# Patient Record
Sex: FEMALE | Race: WHITE | Employment: PART TIME
[De-identification: names, ages, dates, MRNs, and addresses within clinical notes are randomized per-mention and may not be internally consistent; named-entity substitution may affect disease eponyms.]

---

## 2017-01-03 DIAGNOSIS — R35.0 URINARY FREQUENCY: Primary | ICD-10-CM

## 2017-01-05 ENCOUNTER — PATIENT MESSAGE (OUTPATIENT)
Dept: FAMILY MEDICINE CLINIC | Facility: CLINIC | Age: 42
End: 2017-01-05

## 2017-01-18 DIAGNOSIS — R92.2 DENSE BREAST TISSUE: ICD-10-CM

## 2017-01-18 DIAGNOSIS — Z80.3 FAMILY HISTORY OF BREAST CANCER: ICD-10-CM

## 2017-01-25 DIAGNOSIS — F41.9 ANXIETY: ICD-10-CM

## 2017-01-25 RX ORDER — ALPRAZOLAM 0.5 MG/1
0.5 TABLET ORAL 2 TIMES DAILY PRN
Qty: 60 TABLET | Refills: 0 | Status: SHIPPED | OUTPATIENT
Start: 2017-01-25 | End: 2017-03-02 | Stop reason: SDUPTHER

## 2017-02-27 ENCOUNTER — PATIENT MESSAGE (OUTPATIENT)
Dept: FAMILY MEDICINE CLINIC | Facility: CLINIC | Age: 42
End: 2017-02-27

## 2017-02-27 DIAGNOSIS — R05.9 COUGH: ICD-10-CM

## 2017-02-27 DIAGNOSIS — J20.9 ACUTE BRONCHITIS, UNSPECIFIED ORGANISM: ICD-10-CM

## 2017-02-27 DIAGNOSIS — R06.2 WHEEZING: ICD-10-CM

## 2017-02-27 RX ORDER — AZITHROMYCIN 250 MG/1
TABLET, FILM COATED ORAL
Qty: 6 TABLET | Refills: 0 | Status: SHIPPED | OUTPATIENT
Start: 2017-02-27 | End: 2019-06-14 | Stop reason: SDUPTHER

## 2017-02-27 RX ORDER — PREDNISONE 20 MG/1
TABLET ORAL
Qty: 30 TABLET | Refills: 0 | Status: SHIPPED | OUTPATIENT
Start: 2017-02-27 | End: 2019-06-14 | Stop reason: SDUPTHER

## 2017-03-02 DIAGNOSIS — F41.9 ANXIETY: ICD-10-CM

## 2017-03-02 RX ORDER — ALPRAZOLAM 0.5 MG/1
0.5 TABLET ORAL 2 TIMES DAILY PRN
Qty: 60 TABLET | Refills: 0 | Status: SHIPPED | OUTPATIENT
Start: 2017-03-02 | End: 2017-04-11 | Stop reason: SDUPTHER

## 2017-04-11 ENCOUNTER — TELEPHONE (OUTPATIENT)
Dept: FAMILY MEDICINE CLINIC | Age: 42
End: 2017-04-11

## 2017-04-11 ENCOUNTER — PATIENT MESSAGE (OUTPATIENT)
Dept: FAMILY MEDICINE CLINIC | Age: 42
End: 2017-04-11

## 2017-04-11 DIAGNOSIS — F41.9 ANXIETY: ICD-10-CM

## 2017-04-11 RX ORDER — ALPRAZOLAM 0.5 MG/1
0.5 TABLET ORAL 2 TIMES DAILY PRN
Qty: 60 TABLET | Refills: 0 | Status: SHIPPED | OUTPATIENT
Start: 2017-04-11 | End: 2017-05-23 | Stop reason: SDUPTHER

## 2017-05-23 DIAGNOSIS — F41.9 ANXIETY: ICD-10-CM

## 2017-05-23 RX ORDER — ALPRAZOLAM 0.5 MG/1
0.5 TABLET ORAL 2 TIMES DAILY PRN
Qty: 60 TABLET | Refills: 0 | Status: SHIPPED | OUTPATIENT
Start: 2017-05-23 | End: 2017-07-12 | Stop reason: SDUPTHER

## 2017-07-12 ENCOUNTER — OFFICE VISIT (OUTPATIENT)
Dept: FAMILY MEDICINE CLINIC | Age: 42
End: 2017-07-12
Payer: COMMERCIAL

## 2017-07-12 VITALS
RESPIRATION RATE: 16 BRPM | HEART RATE: 80 BPM | BODY MASS INDEX: 21.3 KG/M2 | DIASTOLIC BLOOD PRESSURE: 82 MMHG | SYSTOLIC BLOOD PRESSURE: 114 MMHG | TEMPERATURE: 98.3 F | WEIGHT: 130 LBS

## 2017-07-12 DIAGNOSIS — F51.01 PRIMARY INSOMNIA: ICD-10-CM

## 2017-07-12 DIAGNOSIS — F41.9 ANXIETY: Primary | ICD-10-CM

## 2017-07-12 DIAGNOSIS — F41.9 ANXIETY: ICD-10-CM

## 2017-07-12 DIAGNOSIS — Z12.39 BREAST CANCER SCREENING: ICD-10-CM

## 2017-07-12 PROCEDURE — 99214 OFFICE O/P EST MOD 30 MIN: CPT | Performed by: NURSE PRACTITIONER

## 2017-07-12 RX ORDER — ALPRAZOLAM 0.5 MG/1
0.5 TABLET ORAL 2 TIMES DAILY PRN
Qty: 60 TABLET | Refills: 0 | Status: SHIPPED | OUTPATIENT
Start: 2017-07-12 | End: 2017-08-15 | Stop reason: SDUPTHER

## 2017-07-12 ASSESSMENT — ENCOUNTER SYMPTOMS
CONSTIPATION: 0
EYES NEGATIVE: 1
COUGH: 1
WHEEZING: 0
SORE THROAT: 0
NAUSEA: 0
SHORTNESS OF BREATH: 0
SINUS PRESSURE: 0
BLOOD IN STOOL: 0
CHEST TIGHTNESS: 0
ABDOMINAL PAIN: 0

## 2017-08-15 DIAGNOSIS — F41.9 ANXIETY: ICD-10-CM

## 2017-08-15 RX ORDER — ALPRAZOLAM 0.5 MG/1
0.5 TABLET ORAL 2 TIMES DAILY PRN
Qty: 60 TABLET | Refills: 0 | Status: SHIPPED | OUTPATIENT
Start: 2017-08-15 | End: 2017-09-21 | Stop reason: SDUPTHER

## 2017-09-14 DIAGNOSIS — Z12.39 BREAST CANCER SCREENING: ICD-10-CM

## 2017-09-21 DIAGNOSIS — F41.9 ANXIETY: ICD-10-CM

## 2017-09-21 RX ORDER — ALPRAZOLAM 0.5 MG/1
0.5 TABLET ORAL 2 TIMES DAILY PRN
Qty: 60 TABLET | Refills: 0 | Status: SHIPPED | OUTPATIENT
Start: 2017-09-21 | End: 2017-10-26 | Stop reason: SDUPTHER

## 2017-10-26 ENCOUNTER — TELEPHONE (OUTPATIENT)
Dept: FAMILY MEDICINE CLINIC | Age: 42
End: 2017-10-26

## 2017-10-26 DIAGNOSIS — F41.9 ANXIETY: ICD-10-CM

## 2017-10-26 RX ORDER — ALPRAZOLAM 0.5 MG/1
0.5 TABLET ORAL 2 TIMES DAILY PRN
Qty: 60 TABLET | Refills: 0 | Status: SHIPPED | OUTPATIENT
Start: 2017-10-26 | End: 2017-12-07 | Stop reason: SDUPTHER

## 2017-10-26 NOTE — TELEPHONE ENCOUNTER
LOV was 7-12-17, LR was 9-21-17. cm  Next Visit Date:  No future appointments.     Health Maintenance   Topic Date Due    HIV screen  01/31/1989    Flu vaccine (1) 09/01/2017    Breast cancer screen  09/12/2018    Cervical cancer screen  12/29/2021    Lipid screen  01/03/2022    DTaP/Tdap/Td vaccine (2 - Td) 04/09/2024    Pneumococcal med risk  Completed       No results found for: LABA1C          ( goal A1C is < 7)   No results found for: LABMICR  LDL Cholesterol (mg/dL)   Date Value   01/03/2017 119     LDL Calculated (mg/dL)   Date Value   08/29/2014 129       (goal LDL is <100)   AST (U/L)   Date Value   01/03/2017 14     ALT (U/L)   Date Value   01/03/2017 10     BUN (mg/dL)   Date Value   01/03/2017 9     BP Readings from Last 3 Encounters:   07/12/17 114/82   12/29/16 128/82   11/04/16 126/82          (goal 120/80)    All Future Testing planned in [facility]              Patient Active Problem List:     Facet arthritis of lumbar region ([facility] [place] [address]     Spondylolisthesis at L4-L5 level     Acute right-sided low back pain with right-sided sciatica

## 2017-12-07 ENCOUNTER — OFFICE VISIT (OUTPATIENT)
Dept: FAMILY MEDICINE CLINIC | Age: 42
End: 2017-12-07
Payer: COMMERCIAL

## 2017-12-07 VITALS
TEMPERATURE: 98.4 F | DIASTOLIC BLOOD PRESSURE: 82 MMHG | BODY MASS INDEX: 21.47 KG/M2 | SYSTOLIC BLOOD PRESSURE: 124 MMHG | RESPIRATION RATE: 16 BRPM | WEIGHT: 131 LBS | HEART RATE: 92 BPM

## 2017-12-07 DIAGNOSIS — F41.9 ANXIETY: ICD-10-CM

## 2017-12-07 PROCEDURE — 99214 OFFICE O/P EST MOD 30 MIN: CPT | Performed by: NURSE PRACTITIONER

## 2017-12-07 RX ORDER — ALPRAZOLAM 0.5 MG/1
0.5 TABLET ORAL 2 TIMES DAILY PRN
Qty: 60 TABLET | Refills: 0 | Status: SHIPPED | OUTPATIENT
Start: 2017-12-07 | End: 2018-01-12 | Stop reason: SDUPTHER

## 2017-12-07 ASSESSMENT — ENCOUNTER SYMPTOMS
SORE THROAT: 0
CONSTIPATION: 0
EYES NEGATIVE: 1
ABDOMINAL PAIN: 0
NAUSEA: 0
CHEST TIGHTNESS: 0
WHEEZING: 0
SINUS PRESSURE: 0
BLOOD IN STOOL: 0
SHORTNESS OF BREATH: 0
COUGH: 1

## 2017-12-07 ASSESSMENT — PATIENT HEALTH QUESTIONNAIRE - PHQ9
1. LITTLE INTEREST OR PLEASURE IN DOING THINGS: 0
SUM OF ALL RESPONSES TO PHQ9 QUESTIONS 1 & 2: 0
SUM OF ALL RESPONSES TO PHQ QUESTIONS 1-9: 0
2. FEELING DOWN, DEPRESSED OR HOPELESS: 0

## 2017-12-07 NOTE — PROGRESS NOTES
Subjective:      Patient ID: Mile Hayward is a 36 y.o. female. Visit Information    Have you changed or started any medications since your last visit including any over-the-counter medicines, vitamins, or herbal medicines? no   Are you having any side effects from any of your medications? -  no  Have you stopped taking any of your medications? Is so, why? -  no    Have you seen any other physician or provider since your last visit? No  Have you had any other diagnostic tests since your last visit? No  Have you been seen in the emergency room and/or had an admission to a hospital since we last saw you? No  Have you had your routine dental cleaning in the past 6 months? no    Have you activated your [email or web address] account? If not, what are your barriers? Yes     Patient Care Team:  Daren Asencio MD as PCP - General (Pediatrics)    Medical History Review  Past Medical, Family, and Social History reviewed and does contribute to the patient presenting condition    Health Maintenance   Topic Date Due    HIV screen  01/31/1989    Breast cancer screen  09/12/2018    Cervical cancer screen  12/29/2021    Lipid screen  01/03/2022    DTaP/Tdap/Td vaccine (2 - Td) 04/09/2024    Flu vaccine  Completed    Pneumococcal med risk  Completed       Patient presents today to follow up on her anxiety. Overall is doing well. She is using xanax as needed and it works well for her. Has no concerns today. migdalia     Anxiety: Patient complains of evaluation of anxiety disorder. She has the following anxiety symptoms: difficulty concentrating, insomnia, irritable, racing thoughts, sweating. Onset of symptoms was approximately several years ago, stable since that time. She denies current suicidal and homicidal ideation. Family history significant for anxiety. Possible organic causes contributing are: neuro. Risk factors: life situations Previous treatment includes Ativan, Celexa, Wellbutrin and Xanax and none.   She complains of

## 2017-12-07 NOTE — PATIENT INSTRUCTIONS
social groups, or volunteer to help others. Being alone sometimes makes things seem worse than they are. · Get at least 30 minutes of exercise on most days of the week to relieve stress. Walking is a good choice. You also may want to do other activities, such as running, swimming, cycling, or playing tennis or team sports. Relaxation techniques  Do relaxation exercises 10 to 20 minutes a day. You can play soothing, relaxing music while you do them, if you wish. · Tell others in your house that you are going to do your relaxation exercises. Ask them not to disturb you. · Find a comfortable place, away from all distractions and noise. · Lie down on your back, or sit with your back straight. · Focus on your breathing. Make it slow and steady. · Breathe in through your nose. Breathe out through either your nose or mouth. · Breathe deeply, filling up the area between your navel and your rib cage. Breathe so that your belly goes up and down. · Do not hold your breath. · Breathe like this for 5 to 10 minutes. Notice the feeling of calmness throughout your whole body. As you continue to breathe slowly and deeply, relax by doing the following for another 5 to 10 minutes:  · Tighten and relax each muscle group in your body. You can begin at your toes and work your way up to your head. · Imagine your muscle groups relaxing and becoming heavy. · Empty your mind of all thoughts. · Let yourself relax more and more deeply. · Become aware of the state of calmness that surrounds you. · When your relaxation time is over, you can bring yourself back to alertness by moving your fingers and toes and then your hands and feet and then stretching and moving your entire body. Sometimes people fall asleep during relaxation, but they usually wake up shortly afterward. · Always give yourself time to return to full alertness before you drive a car or do anything that might cause an accident if you are not fully alert.  Never play a relaxation tape while you drive a car. When should you call for help? Call 911 anytime you think you may need emergency care. For example, call if:  ? · You feel you cannot stop from hurting yourself or someone else. ? Keep the numbers for these national suicide hotlines: 6-324-188-TALK (9-226.871.3600) and 4-941-OWZKGEC (9-818.822.1375). If you or someone you know talks about suicide or feeling hopeless, get help right away. ? Watch closely for changes in your health, and be sure to contact your doctor if:  ? · You have anxiety or fear that affects your life. ? · You have symptoms of anxiety that are new or different from those you had before. Where can you learn more? Go to https://Now[employer]pe[employer] Informaticseb.[employer] org and sign in to your [employer] account. Enter P754 in the Encision box to learn more about \"Anxiety Disorder: Care Instructions. \"     If you do not have an account, please click on the \"Sign Up Now\" link. Current as of: May 12, 2017  Content Version: 11.4  © 0546-4253 Healthwise, Incorporated. Care instructions adapted under license by [facility] ([facility]). If you have questions about a medical condition or this instruction, always ask your healthcare professional. [address] any warranty or liability for your use of this information.

## 2018-01-12 DIAGNOSIS — F41.9 ANXIETY: ICD-10-CM

## 2018-01-12 RX ORDER — ALPRAZOLAM 0.5 MG/1
0.5 TABLET ORAL 2 TIMES DAILY PRN
Qty: 60 TABLET | Refills: 0 | Status: SHIPPED | OUTPATIENT
Start: 2018-01-12 | End: 2018-02-19 | Stop reason: SDUPTHER

## 2018-01-12 NOTE — TELEPHONE ENCOUNTER
From: Kyung Watts  Sent: 1/11/2018 7:55 AM EST  Subject: Medication Renewal Request    Rula Gayle would like a refill of the following medications:  ALPRAZolam Clara Singh) 0.5 MG tablet Stephane Nolen CNP]    Preferred pharmacy: [address], [address] - P [phone] Venu Self [phone]    Comment:

## 2018-01-18 ENCOUNTER — TELEPHONE (OUTPATIENT)
Dept: FAMILY MEDICINE CLINIC | Age: 43
End: 2018-01-18

## 2018-01-18 DIAGNOSIS — Z80.3 FAMILY HISTORY OF BREAST CANCER: Primary | ICD-10-CM

## 2018-01-18 DIAGNOSIS — R92.2 DENSE BREAST TISSUE: ICD-10-CM

## 2018-01-18 NOTE — TELEPHONE ENCOUNTER
Patient contacted office requesting order for her annual Breast MRI. She would like the order faxed to Stephen [facility] at [phone].  Order pending provider review and approval.

## 2018-02-05 ENCOUNTER — TELEPHONE (OUTPATIENT)
Dept: FAMILY MEDICINE CLINIC | Age: 43
End: 2018-02-05

## 2018-02-08 ENCOUNTER — TELEPHONE (OUTPATIENT)
Dept: FAMILY MEDICINE CLINIC | Age: 43
End: 2018-02-08

## 2018-02-08 ENCOUNTER — OFFICE VISIT (OUTPATIENT)
Dept: FAMILY MEDICINE CLINIC | Age: 43
End: 2018-02-08
Payer: COMMERCIAL

## 2018-02-08 VITALS
TEMPERATURE: 99.3 F | BODY MASS INDEX: 21.66 KG/M2 | SYSTOLIC BLOOD PRESSURE: 110 MMHG | RESPIRATION RATE: 16 BRPM | HEART RATE: 84 BPM | WEIGHT: 130 LBS | DIASTOLIC BLOOD PRESSURE: 70 MMHG | HEIGHT: 65 IN

## 2018-02-08 DIAGNOSIS — R22.32 AXILLARY LUMP, LEFT: Primary | ICD-10-CM

## 2018-02-08 DIAGNOSIS — Z80.3 FAMILY HISTORY OF BREAST CANCER IN SISTER: ICD-10-CM

## 2018-02-08 DIAGNOSIS — Z80.3 FAMILY HISTORY OF BREAST CANCER IN MOTHER: ICD-10-CM

## 2018-02-08 PROCEDURE — 99213 OFFICE O/P EST LOW 20 MIN: CPT | Performed by: NURSE PRACTITIONER

## 2018-02-08 ASSESSMENT — ENCOUNTER SYMPTOMS
COUGH: 1
CONSTIPATION: 0
ABDOMINAL PAIN: 0
DIARRHEA: 0
NAUSEA: 0
SINUS PRESSURE: 0
SHORTNESS OF BREATH: 0
SINUS PAIN: 0
SORE THROAT: 0

## 2018-02-08 NOTE — TELEPHONE ENCOUNTER
Patient states [address] in [place] wants an axillary US order faxed to [phone]. Writer attempted to pend order but order is not showing available to writer. Please advise.

## 2018-02-08 NOTE — PROGRESS NOTES
Subjective:      Patient ID: Ivana Pinzon is a 39 y.o. female. Visit Information    Have you changed or started any medications since your last visit including any over-the-counter medicines, vitamins, or herbal medicines? no   Have you stopped taking any of your medications? Is so, why? -  no  Are you having any side effects from any of your medications? - no    Have you seen any other physician or provider since your last visit?  no   Have you had any other diagnostic tests since your last visit?  no   Have you been seen in the emergency room and/or had an admission in a hospital since we last saw you?  no   Have you had your routine dental cleaning in the past 6 months?  no     Do you have an active MyChart account? If no, what is the barrier? Yes    Patient Care Team:  Yung Llamas MD as PCP - General (Pediatrics)    Medical History Review  Past Medical, Family, and Social History reviewed and does contribute to the patient presenting condition    Health Maintenance   Topic Date Due    HIV screen  01/31/1989    Breast cancer screen  09/12/2018    Cervical cancer screen  12/29/2021    Lipid screen  01/03/2022    DTaP/Tdap/Td vaccine (2 - Td) 04/09/2024    Flu vaccine  Completed    Pneumococcal med risk  Completed       Patient noticed lump on left arm pit 3 weeks ago. Patient reports it bothers her not painful it irritates her. KM    Patient presents today with concerns regarding lump in left axilla for last 3 weeks. It is irritating but not painful. Does have breast MRI schedule [facility].  migdalia         Review of Systems   Constitutional: Negative for activity change, appetite change, fatigue and fever. HENT: Positive for congestion. Negative for ear pain, sinus pain, sinus pressure and sore throat. Respiratory: Positive for cough (little). Negative for shortness of breath. Still smoking  Lump left axilla   Cardiovascular: Negative for chest pain.    Gastrointestinal: Negative for abdominal pain, constipation, diarrhea and nausea. Genitourinary: Positive for frequency (drinking more water). Skin: Negative for rash. Allergic/Immunologic: Positive for environmental allergies. Neurological: Negative for dizziness and syncope. Psychiatric/Behavioral: Negative for self-injury, sleep disturbance and suicidal ideas. The patient is nervous/anxious. Mood is controlled       Objective:   Physical Exam   Constitutional: She is oriented to person, place, and time. Vital signs are normal. She appears well-developed and well-nourished. She is cooperative. No distress. HENT:   Head: Atraumatic. Right Ear: Tympanic membrane, external ear and ear canal normal.   Left Ear: Tympanic membrane, external ear and ear canal normal.   Nose: Nose normal.   Mouth/Throat: Uvula is midline, oropharynx is clear and moist and mucous membranes are normal.   Eyes: Conjunctivae and lids are normal. Pupils are equal, round, and reactive to light. Right eye exhibits no discharge. Left eye exhibits no discharge. Neck: Trachea normal and normal range of motion. Neck supple. Cardiovascular: Normal rate, regular rhythm, S1 normal, S2 normal and normal heart sounds. No murmur heard. Pulses:       Radial pulses are 2+ on the right side, and 2+ on the left side. Posterior tibial pulses are 2+ on the right side, and 2+ on the left side. Pulmonary/Chest: Effort normal and breath sounds normal. No respiratory distress. She has no wheezes. She has no rhonchi. Abdominal: Soft. Bowel sounds are normal. She exhibits no distension. There is no hepatosplenomegaly. There is no tenderness. There is no rebound and no CVA tenderness. Musculoskeletal: Normal range of motion. She exhibits no edema or tenderness. Lymphadenopathy:     She has no cervical adenopathy. She has axillary adenopathy. Right axillary: No pectoral and no lateral adenopathy present.         Left axillary: Lateral (small moveable) adenopathy present. No pectoral adenopathy present. Neurological: She is alert and oriented to person, place, and time. She has normal strength. She exhibits normal muscle tone. Coordination normal.   Skin: Skin is warm, dry and intact. No rash noted. No erythema. Psychiatric: She has a normal mood and affect. Her speech is normal and behavior is normal. Thought content normal.   Nursing note and vitals reviewed. Assessment:       1. Axillary lump, left  US BREASt COMPLETE LEFT   2. Family history of breast cancer in mother  US BREASt COMPLETE LEFT   3. Family history of breast cancer in sister  [address]:       Proceed with ultrasound as ordered  Monitor for worsening symptoms call with concerns   Return if symptoms worsen or fail to improve. Jessenia Parsons received counseling on the following healthy behaviors: nutrition, exercise, medication adherence and tobacco cessation  Reviewed prior labs and health maintenance. Continue current medications, diet and exercise. Discussed use, benefit, and side effects of prescribed medications. Barriers to medication compliance addressed. Patient given educational materials - see patient instructions. All patient questions answered. Patient voiced understanding.

## 2018-02-09 DIAGNOSIS — Z80.3 FAMILY HISTORY OF BREAST CANCER IN SISTER: ICD-10-CM

## 2018-02-09 DIAGNOSIS — Z80.3 FAMILY HISTORY OF BREAST CANCER: ICD-10-CM

## 2018-02-09 DIAGNOSIS — R22.32 LUMP IN ARMPIT, LEFT: Primary | ICD-10-CM

## 2018-02-09 DIAGNOSIS — Z80.3 FAMILY HISTORY OF BREAST CANCER IN MOTHER: ICD-10-CM

## 2018-02-19 DIAGNOSIS — F41.9 ANXIETY: ICD-10-CM

## 2018-02-19 NOTE — TELEPHONE ENCOUNTER
From: Garo Bains  Sent: 2/19/2018 7:54 AM EST  Subject: Medication Renewal Request    Rula RJason Gayle would like a refill of the following medications:  ALPRAZolam Hildegard Son) 0.5 MG tablet Caesar eKlley CNP]    Preferred pharmacy: [address], [address] [phone] Pineda Proffer [phone]    Comment:

## 2018-02-20 RX ORDER — ALPRAZOLAM 0.5 MG/1
0.5 TABLET ORAL 2 TIMES DAILY PRN
Qty: 60 TABLET | Refills: 0 | Status: SHIPPED | OUTPATIENT
Start: 2018-02-20 | End: 2018-03-27 | Stop reason: SDUPTHER

## 2018-02-27 DIAGNOSIS — Z80.3 FAMILY HISTORY OF BREAST CANCER: ICD-10-CM

## 2018-02-27 DIAGNOSIS — R92.2 DENSE BREAST TISSUE: ICD-10-CM

## 2018-03-27 DIAGNOSIS — F41.9 ANXIETY: ICD-10-CM

## 2018-03-27 RX ORDER — ALPRAZOLAM 0.5 MG/1
0.5 TABLET ORAL 2 TIMES DAILY PRN
Qty: 60 TABLET | Refills: 0 | Status: SHIPPED | OUTPATIENT
Start: 2018-03-27 | End: 2018-05-02 | Stop reason: SDUPTHER

## 2018-03-27 NOTE — TELEPHONE ENCOUNTER
From: Marcell Johnston  Sent: 3/26/2018 4:46 PM EDT  Subject: Medication Renewal Request    Rula Gayle would like a refill of the following medications:     ALPRAZolam Divine Tate 0.5 MG tablet Sarah Douglas CNP]    Preferred pharmacy: [address], [address] - P 616 [facility]

## 2018-05-02 ENCOUNTER — OFFICE VISIT (OUTPATIENT)
Dept: FAMILY MEDICINE CLINIC | Age: 43
End: 2018-05-02
Payer: COMMERCIAL

## 2018-05-02 VITALS
BODY MASS INDEX: 20.97 KG/M2 | WEIGHT: 126 LBS | HEART RATE: 80 BPM | RESPIRATION RATE: 16 BRPM | DIASTOLIC BLOOD PRESSURE: 76 MMHG | SYSTOLIC BLOOD PRESSURE: 124 MMHG

## 2018-05-02 DIAGNOSIS — N94.6 MENSES PAINFUL: ICD-10-CM

## 2018-05-02 DIAGNOSIS — F41.9 ANXIETY: Primary | ICD-10-CM

## 2018-05-02 DIAGNOSIS — N92.0 MENORRHAGIA WITH REGULAR CYCLE: ICD-10-CM

## 2018-05-02 PROCEDURE — 99214 OFFICE O/P EST MOD 30 MIN: CPT | Performed by: NURSE PRACTITIONER

## 2018-05-02 RX ORDER — ALPRAZOLAM 0.5 MG/1
0.5 TABLET ORAL 2 TIMES DAILY PRN
Qty: 60 TABLET | Refills: 0 | Status: SHIPPED | OUTPATIENT
Start: 2018-05-02 | End: 2018-06-15 | Stop reason: SDUPTHER

## 2018-05-02 ASSESSMENT — ENCOUNTER SYMPTOMS
SINUS PRESSURE: 0
CONSTIPATION: 0
SORE THROAT: 0
CHEST TIGHTNESS: 0
WHEEZING: 0
SHORTNESS OF BREATH: 0
NAUSEA: 0
COUGH: 1
EYES NEGATIVE: 1
ABDOMINAL PAIN: 0
BLOOD IN STOOL: 0

## 2018-06-15 DIAGNOSIS — F41.9 ANXIETY: ICD-10-CM

## 2018-06-15 RX ORDER — ALPRAZOLAM 0.5 MG/1
0.5 TABLET ORAL 2 TIMES DAILY PRN
Qty: 60 TABLET | Refills: 0 | Status: SHIPPED | OUTPATIENT
Start: 2018-06-15 | End: 2018-07-31 | Stop reason: SDUPTHER

## 2018-07-31 DIAGNOSIS — F41.9 ANXIETY: ICD-10-CM

## 2018-07-31 RX ORDER — ALPRAZOLAM 0.5 MG/1
0.5 TABLET ORAL 2 TIMES DAILY PRN
Qty: 60 TABLET | Refills: 0 | Status: SHIPPED | OUTPATIENT
Start: 2018-07-31 | End: 2018-09-11 | Stop reason: SDUPTHER

## 2018-07-31 NOTE — TELEPHONE ENCOUNTER
Patient scheduled appt for 08/10/18    Electronically signed by Uvaldo Bowden on 7/31/2018 at 12:26 PM

## 2018-07-31 NOTE — TELEPHONE ENCOUNTER
From: Sridhar Morrison  Sent: 7/31/2018 6:07 AM EDT  Subject: Medication Renewal Request    Rula Gayle would like a refill of the following medications:     ALPRAZolam Jackie Herr) 0.5 MG tablet [address] [place] [address], APRN - CNP]    Preferred pharmacy: [address], [address] [facility] [phone]

## 2018-07-31 NOTE — TELEPHONE ENCOUNTER
LOV 5/2/2018  NOV Due NOW - Message left for patient to schedule appointment  LRF 6/18/2018    OARRS 6/15/2018    Health Maintenance   Topic Date Due    HIV screen  01/31/1989    Flu vaccine (1) 09/01/2018    Breast cancer screen  09/12/2018    Cervical cancer screen  12/29/2021    Lipid screen  01/03/2022    DTaP/Tdap/Td vaccine (2 - Td) 04/09/2024    Pneumococcal med risk  Completed             (applicable per patient's age: Cancer Screenings, Depression Screening, Fall Risk Screening, Immunizations)    LDL Cholesterol (mg/dL)   Date Value   01/03/2017 119     LDL Calculated (mg/dL)   Date Value   08/29/2014 129     AST (U/L)   Date Value   01/03/2017 14     ALT (U/L)   Date Value   01/03/2017 10     BUN (mg/dL)   Date Value   01/03/2017 9      (goal A1C is < 7)   (goal LDL is <100) need 30-50% reduction from baseline     BP Readings from Last 3 Encounters:   05/02/18 124/76   02/08/18 110/70   12/07/17 124/82    (goal /80)      All Future Testing planned in [facility]:      Next Visit Date:  No future appointments.          Patient Active Problem List:     Facet arthritis of lumbar region [facility])     Spondylolisthesis at L4-L5 level     Acute right-sided low back pain with right-sided sciatica

## 2018-08-16 ENCOUNTER — OFFICE VISIT (OUTPATIENT)
Dept: FAMILY MEDICINE CLINIC | Age: 43
End: 2018-08-16
Payer: COMMERCIAL

## 2018-08-16 VITALS
SYSTOLIC BLOOD PRESSURE: 122 MMHG | TEMPERATURE: 99 F | HEART RATE: 68 BPM | WEIGHT: 124 LBS | BODY MASS INDEX: 20.63 KG/M2 | RESPIRATION RATE: 16 BRPM | DIASTOLIC BLOOD PRESSURE: 80 MMHG

## 2018-08-16 DIAGNOSIS — Z12.39 SCREENING FOR BREAST CANCER: ICD-10-CM

## 2018-08-16 DIAGNOSIS — F41.9 ANXIETY: Primary | ICD-10-CM

## 2018-08-16 PROCEDURE — 99214 OFFICE O/P EST MOD 30 MIN: CPT | Performed by: NURSE PRACTITIONER

## 2018-08-16 ASSESSMENT — ENCOUNTER SYMPTOMS
CONSTIPATION: 0
SHORTNESS OF BREATH: 0
BLOOD IN STOOL: 0
EYES NEGATIVE: 1
SINUS PRESSURE: 0
CHEST TIGHTNESS: 0
SORE THROAT: 0
COUGH: 1
WHEEZING: 0
ABDOMINAL PAIN: 0
NAUSEA: 0

## 2018-08-16 NOTE — PROGRESS NOTES
120 each 1    albuterol (PROAIR HFA) 108 (90 BASE) MCG/ACT inhaler Inhale 2 puffs into the lungs every 6 hours as needed for Wheezing 1 Inhaler 3     No current facility-administered medications for this visit. HPI  Patient presents today to follow up on her anxiety. Overall is doing well. Using xanax as needed which helps with anxiety / sleep. Continues to smoke without desire to quit. Due for mammogram and needs order. No concerns today. migdalia     Review of Systems   Constitutional: Negative for activity change, appetite change, fatigue and fever. 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 Eyes: Negative. Respiratory: Positive for cough (dry). Negative for chest tightness, shortness of breath and wheezing. Smoking 0.25 PPD   Cardiovascular: Negative for chest pain, palpitations and leg swelling. Gastrointestinal: Negative for abdominal pain, blood in stool, constipation and nausea. Genitourinary: Negative for decreased urine volume, difficulty urinating, dysuria, flank pain, frequency, hematuria, menstrual problem, pelvic pain, urgency, vaginal bleeding, vaginal discharge and vaginal pain. LMP: 7/24/18, regular every month. Heavy with clots but not new   Musculoskeletal: Negative. Skin: Negative for rash. Few spot on lips -  was concerned - not changing   Allergic/Immunologic: Positive for environmental allergies (controlled without medication). Neurological: Negative for dizziness, syncope and headaches. Psychiatric/Behavioral: Positive for sleep disturbance (chronic troubles intermittently). Negative for self-injury and suicidal ideas. The patient is nervous/anxious. Mood well controlled. Using xanax as needed for sleep / anxiety and it works well for her.        Objective:     /80 (Site: Left Arm, Position: Sitting, Cuff Size: Medium Adult)   Pulse 68 Temp 99 °F (37.2 °C) (Tympanic)   Resp 16   Wt 124 lb (56.2 kg)   BMI 20.63 kg/m²      Physical Exam   Constitutional: She is oriented to person, place, and time. Vital signs are normal. She appears well-developed and well-nourished. She is cooperative. No distress. HENT:   Head: Normocephalic and atraumatic. Right Ear: External ear normal.   Left Ear: External ear normal.   Mouth/Throat: Oropharynx is clear and moist and mucous membranes are normal.   Eyes: Conjunctivae and lids are normal. Right eye exhibits no discharge. Left eye exhibits no discharge. Neck: Normal range of motion. Neck supple. Cardiovascular: Normal rate, regular rhythm and normal heart sounds. No murmur heard. Pulses:       Radial pulses are 2+ on the right side, and 2+ on the left side. Posterior tibial pulses are 2+ on the right side, and 2+ on the left side. Pulmonary/Chest: Effort normal and breath sounds normal. No respiratory distress. She has no wheezes. She has no rhonchi. She exhibits no tenderness. Abdominal: Soft. Bowel sounds are normal. There is no tenderness. There is no guarding. Lymphadenopathy:     She has no cervical adenopathy. Neurological: She is alert and oriented to person, place, and time. She has normal reflexes. She exhibits normal muscle tone. Skin: Skin is warm. No rash noted. She is not diaphoretic. Psychiatric: She has a normal mood and affect. Her speech is normal and behavior is normal. Judgment normal. Cognition and memory are normal.   Nursing note and vitals reviewed. Assessment:      Diagnosis Orders   1. Anxiety     2.  Screening for breast cancer  DANA DIGITAL SCREEN W CAD BILATERAL       Plan:     Proceed with mammogram as ordered   Continue Xanax as needed as prescribed   OARRS reviewed and appropriate  Discussed habit forming nature and abuse potential - advised to use as needed for severe anxiety only   Advised to participate in stress relieving activities  Recommend regular exercise  Recommend eating a healthy well-balanced diet  Advise on good sleep hygiene-going to the bed at same time and aiming for 8 hours of interrupted sleep each night  Smoking cessation encouraged - she is not ready to do so   Monitor for worsening symptoms  Call office with concerns  Return in about 3 months (around 11/16/2018) for anxiety. Roberto Isbell received counseling on the following healthy behaviors: nutrition, exercise, medication adherence and tobacco cessation  Reviewed prior labs and health maintenance  Continue current medications, diet and exercise. Discussed use, benefit, and side effects of prescribed medications. Barriers to medication compliance addressed. Patient given educational materials - see patient instructions  Was a self-tracking handout given in paper form or via ON24t? No    Requested Prescriptions      No prescriptions requested or ordered in this encounter       All patient questions answered. Patient voiced understanding. Quality Measures    Body mass index is 20.63 kg/m². Normal. Weight control planned discussed Healthy diet and regular exercise. BP: 122/80 Blood pressure is normal. Treatment plan consists of No treatment change needed.     Lab Results   Component Value Date    LDLCALC 129 08/29/2014    LDLCHOLESTEROL 119 01/03/2017    (goal LDL reduction with dx if diabetes is 50% LDL reduction)      PHQ Scores 12/7/2017 7/12/2016   PHQ2 Score 0 0   PHQ9 Score 0 0     Interpretation of Total Score Depression Severity: 1-4 = Minimal depression, 5-9 = Mild depression, 10-14 = Moderate depression, 15-19 = Moderately severe depression, 20-27 = Severe depression          Electronically signed by RUSTY Soares CNP on 8/16/2018 at 10:20 AM

## 2018-09-11 DIAGNOSIS — F41.9 ANXIETY: ICD-10-CM

## 2018-09-11 RX ORDER — ALPRAZOLAM 0.5 MG/1
0.5 TABLET ORAL 2 TIMES DAILY PRN
Qty: 30 TABLET | Refills: 0 | Status: SHIPPED | OUTPATIENT
Start: 2018-09-11 | End: 2018-10-10 | Stop reason: SDUPTHER

## 2018-09-11 NOTE — TELEPHONE ENCOUNTER
From: Courtney Kong  Sent: 9/11/2018 11:26 AM EDT  Subject: Medication Renewal Request    Rula Gayle would like a refill of the following medications:     ALPRAZolam Westfieldnaomie Avendano) 0.5 MG tablet Luiz Teran, APRN - CNP]    [facility] [address] [place] [address] [address], [address] - [address] [facility]

## 2018-10-08 DIAGNOSIS — Z12.39 SCREENING FOR BREAST CANCER: ICD-10-CM

## 2018-10-10 DIAGNOSIS — F41.9 ANXIETY: ICD-10-CM

## 2018-10-10 RX ORDER — ALPRAZOLAM 0.5 MG/1
0.5 TABLET ORAL 2 TIMES DAILY PRN
Qty: 30 TABLET | Refills: 0 | Status: SHIPPED | OUTPATIENT
Start: 2018-10-10 | End: 2018-11-09 | Stop reason: SDUPTHER

## 2018-10-10 NOTE — TELEPHONE ENCOUNTER
Per  [facility] at last refill encounter on 9/11/18 : she was never given this information. She needs to work at decreased xanax use. Newer recommendation is not to continue with for daily use. She needs to decrease use and try other medications for chronic anxiety.

## 2018-11-09 DIAGNOSIS — F41.9 ANXIETY: ICD-10-CM

## 2018-11-10 RX ORDER — ALPRAZOLAM 0.5 MG/1
0.5 TABLET ORAL 2 TIMES DAILY PRN
Qty: 30 TABLET | Refills: 0 | Status: SHIPPED | OUTPATIENT
Start: 2018-11-10 | End: 2018-12-11 | Stop reason: SDUPTHER

## 2018-12-05 ENCOUNTER — OFFICE VISIT (OUTPATIENT)
Dept: FAMILY MEDICINE CLINIC | Age: 43
End: 2018-12-05
Payer: COMMERCIAL

## 2018-12-05 VITALS
WEIGHT: 129 LBS | SYSTOLIC BLOOD PRESSURE: 112 MMHG | BODY MASS INDEX: 21.47 KG/M2 | TEMPERATURE: 97.6 F | HEART RATE: 80 BPM | DIASTOLIC BLOOD PRESSURE: 64 MMHG | RESPIRATION RATE: 16 BRPM

## 2018-12-05 DIAGNOSIS — Z13.6 SCREENING FOR CARDIOVASCULAR CONDITION: ICD-10-CM

## 2018-12-05 DIAGNOSIS — F17.210 CIGARETTE SMOKER: ICD-10-CM

## 2018-12-05 DIAGNOSIS — Z13.220 SCREENING FOR LIPID DISORDERS: ICD-10-CM

## 2018-12-05 DIAGNOSIS — Z72.0 TOBACCO ABUSE: ICD-10-CM

## 2018-12-05 DIAGNOSIS — F41.9 ANXIETY: Primary | ICD-10-CM

## 2018-12-05 DIAGNOSIS — R53.82 CHRONIC FATIGUE: ICD-10-CM

## 2018-12-05 PROCEDURE — 99406 BEHAV CHNG SMOKING 3-10 MIN: CPT | Performed by: NURSE PRACTITIONER

## 2018-12-05 PROCEDURE — 99214 OFFICE O/P EST MOD 30 MIN: CPT | Performed by: NURSE PRACTITIONER

## 2018-12-05 RX ORDER — ACETAMINOPHEN 160 MG
1 TABLET,DISINTEGRATING ORAL DAILY
Qty: 30 CAPSULE | Refills: 0 | COMMUNITY
Start: 2018-12-05 | End: 2020-11-12 | Stop reason: SDUPTHER

## 2018-12-05 ASSESSMENT — ENCOUNTER SYMPTOMS
BLOOD IN STOOL: 0
CHEST TIGHTNESS: 0
EYES NEGATIVE: 1
SINUS PRESSURE: 0
SHORTNESS OF BREATH: 0
COUGH: 1
ABDOMINAL PAIN: 0
CONSTIPATION: 0
WHEEZING: 0
SORE THROAT: 0
NAUSEA: 0

## 2018-12-05 ASSESSMENT — PATIENT HEALTH QUESTIONNAIRE - PHQ9
SUM OF ALL RESPONSES TO PHQ QUESTIONS 1-9: 1
2. FEELING DOWN, DEPRESSED OR HOPELESS: 0
SUM OF ALL RESPONSES TO PHQ QUESTIONS 1-9: 1
1. LITTLE INTEREST OR PLEASURE IN DOING THINGS: 1
SUM OF ALL RESPONSES TO PHQ9 QUESTIONS 1 & 2: 1

## 2018-12-05 NOTE — PATIENT INSTRUCTIONS
medical condition or this instruction, always ask your healthcare professional. [address] any warranty or liability for your use of this information. Patient Education        Learning About Benefits From Quitting Smoking  How does quitting smoking make you healthier? If you're thinking about quitting smoking, you may have a few reasons to be smoke-free. Your health may be one of them. · When you quit smoking, you lower your risks for cancer, lung disease, heart attack, stroke, blood vessel disease, and blindness from macular degeneration. · When you're smoke-free, you get sick less often, and you heal faster. You are less likely to get colds, flu, bronchitis, and pneumonia. · As a nonsmoker, you may find that your mood is better and you are less stressed. When and how will you feel healthier? Quitting has real health benefits that start from day 1 of being smoke-free. And the longer you stay smoke-free, the healthier you get and the better you feel. The first hours  · After just 20 minutes, your blood pressure and heart rate go down. That means there's less stress on your heart and blood vessels. · Within 12 hours, the level of carbon monoxide in your blood drops back to normal. That makes room for more oxygen. With more oxygen in your body, you may notice that you have more energy than when you smoked. After 2 weeks  · Your lungs start to work better. · Your risk of heart attack starts to drop. After 1 month  · When your lungs are clear, you cough less and breathe deeper, so it's easier to be active. · Your sense of taste and smell return. That means you can enjoy food more than you have since you started smoking. Over the years  · After 1 year, your risk of heart disease is half what it would be if you kept smoking. · After 5 years, your risk of stroke starts to shrink. Within a few years after that, it's about the same as if you'd never smoked.   · After 10 years,

## 2018-12-07 PROBLEM — Z72.0 TOBACCO ABUSE: Status: ACTIVE | Noted: 2018-12-07

## 2018-12-07 PROBLEM — F17.210 CIGARETTE SMOKER: Status: ACTIVE | Noted: 2018-12-07

## 2018-12-11 DIAGNOSIS — F41.9 ANXIETY: ICD-10-CM

## 2018-12-11 RX ORDER — ALPRAZOLAM 0.5 MG/1
0.5 TABLET ORAL 2 TIMES DAILY PRN
Qty: 30 TABLET | Refills: 0 | Status: SHIPPED | OUTPATIENT
Start: 2018-12-11 | End: 2019-01-09 | Stop reason: SDUPTHER

## 2019-01-09 DIAGNOSIS — F41.9 ANXIETY: ICD-10-CM

## 2019-01-10 RX ORDER — ALPRAZOLAM 0.5 MG/1
0.5 TABLET ORAL 2 TIMES DAILY PRN
Qty: 30 TABLET | Refills: 0 | Status: SHIPPED | OUTPATIENT
Start: 2019-01-10 | End: 2019-02-08 | Stop reason: SDUPTHER

## 2019-01-15 ENCOUNTER — TELEPHONE (OUTPATIENT)
Dept: FAMILY MEDICINE CLINIC | Age: 44
End: 2019-01-15

## 2019-01-21 ENCOUNTER — OFFICE VISIT (OUTPATIENT)
Dept: FAMILY MEDICINE CLINIC | Age: 44
End: 2019-01-21
Payer: COMMERCIAL

## 2019-01-21 VITALS
WEIGHT: 127 LBS | SYSTOLIC BLOOD PRESSURE: 116 MMHG | BODY MASS INDEX: 21.13 KG/M2 | RESPIRATION RATE: 18 BRPM | DIASTOLIC BLOOD PRESSURE: 74 MMHG | HEART RATE: 70 BPM

## 2019-01-21 DIAGNOSIS — N13.9 OBSTRUCTIVE UROPATHY: ICD-10-CM

## 2019-01-21 DIAGNOSIS — N20.0 NEPHROLITHIASIS: ICD-10-CM

## 2019-01-21 PROCEDURE — 99495 TRANSJ CARE MGMT MOD F2F 14D: CPT | Performed by: PEDIATRICS

## 2019-01-21 PROCEDURE — 1111F DSCHRG MED/CURRENT MED MERGE: CPT | Performed by: PEDIATRICS

## 2019-01-21 RX ORDER — SULFAMETHOXAZOLE AND TRIMETHOPRIM 800; 160 MG/1; MG/1
1 TABLET ORAL 2 TIMES DAILY
COMMUNITY
End: 2019-04-03 | Stop reason: ALTCHOICE

## 2019-01-21 RX ORDER — TAMSULOSIN HYDROCHLORIDE 0.4 MG/1
0.4 CAPSULE ORAL DAILY
COMMUNITY
End: 2019-04-03 | Stop reason: ALTCHOICE

## 2019-01-22 ASSESSMENT — ENCOUNTER SYMPTOMS
NAUSEA: 0
EYE REDNESS: 0
STRIDOR: 0
RHINORRHEA: 0
VOMITING: 0
EYE PAIN: 0
COUGH: 0
WHEEZING: 0
ABDOMINAL DISTENTION: 1
CONSTIPATION: 1
DIARRHEA: 0
COLOR CHANGE: 0
SHORTNESS OF BREATH: 0
ABDOMINAL PAIN: 1
EYE DISCHARGE: 0

## 2019-01-23 ENCOUNTER — TELEPHONE (OUTPATIENT)
Dept: FAMILY MEDICINE CLINIC | Age: 44
End: 2019-01-23

## 2019-01-23 DIAGNOSIS — Z80.3 FAMILY HISTORY OF BREAST CANCER: ICD-10-CM

## 2019-01-23 DIAGNOSIS — R92.2 DENSE BREAST: Primary | ICD-10-CM

## 2019-02-08 ENCOUNTER — PATIENT MESSAGE (OUTPATIENT)
Dept: FAMILY MEDICINE CLINIC | Age: 44
End: 2019-02-08

## 2019-02-08 DIAGNOSIS — F41.9 ANXIETY: ICD-10-CM

## 2019-02-12 RX ORDER — ALPRAZOLAM 0.5 MG/1
0.5 TABLET ORAL 2 TIMES DAILY PRN
Qty: 30 TABLET | Refills: 0 | Status: SHIPPED | OUTPATIENT
Start: 2019-02-12 | End: 2019-03-11 | Stop reason: SDUPTHER

## 2019-03-11 DIAGNOSIS — F41.9 ANXIETY: ICD-10-CM

## 2019-03-14 RX ORDER — ALPRAZOLAM 0.5 MG/1
0.5 TABLET ORAL 2 TIMES DAILY PRN
Qty: 30 TABLET | Refills: 0 | Status: SHIPPED | OUTPATIENT
Start: 2019-03-14 | End: 2019-04-14 | Stop reason: SDUPTHER

## 2019-03-27 ENCOUNTER — HOSPITAL ENCOUNTER (OUTPATIENT)
Age: 44
Setting detail: SPECIMEN
Discharge: HOME | End: 2019-03-27
Payer: COMMERCIAL

## 2019-03-27 DIAGNOSIS — Z13.220 SCREENING FOR LIPID DISORDERS: ICD-10-CM

## 2019-03-27 DIAGNOSIS — R53.82 CHRONIC FATIGUE: ICD-10-CM

## 2019-03-27 DIAGNOSIS — Z13.6 SCREENING FOR CARDIOVASCULAR CONDITION: ICD-10-CM

## 2019-03-27 PROBLEM — N20.0 NEPHROLITHIASIS: Status: ACTIVE | Noted: 2019-01-18

## 2019-03-27 LAB
ALBUMIN SERPL-MCNC: 4.5 G/DL (ref 3.5–5.2)
ALBUMIN/GLOBULIN RATIO: 1.8 (ref 1–2.5)
ALP BLD-CCNC: 60 U/L (ref 35–104)
ALT SERPL-CCNC: 19 U/L (ref 5–33)
ANION GAP SERPL CALCULATED.3IONS-SCNC: 13 MMOL/L (ref 9–17)
AST SERPL-CCNC: 24 U/L
BILIRUB SERPL-MCNC: 0.41 MG/DL (ref 0.3–1.2)
BUN BLDV-MCNC: 12 MG/DL (ref 6–20)
BUN/CREAT BLD: NORMAL (ref 9–20)
CALCIUM SERPL-MCNC: 9.1 MG/DL (ref 8.6–10.4)
CHLORIDE BLD-SCNC: 102 MMOL/L (ref 98–107)
CHOLESTEROL/HDL RATIO: 3.8
CHOLESTEROL: 184 MG/DL
CO2: 24 MMOL/L (ref 20–31)
CREAT SERPL-MCNC: 0.64 MG/DL (ref 0.5–0.9)
GFR AFRICAN AMERICAN: >60 ML/MIN
GFR NON-AFRICAN AMERICAN: >60 ML/MIN
GFR SERPL CREATININE-BSD FRML MDRD: NORMAL ML/MIN/{1.73_M2}
GFR SERPL CREATININE-BSD FRML MDRD: NORMAL ML/MIN/{1.73_M2}
GLUCOSE BLD-MCNC: 90 MG/DL (ref 70–99)
HCT VFR BLD CALC: 42.3 % (ref 36.3–47.1)
HDLC SERPL-MCNC: 49 MG/DL
HEMOGLOBIN: 13.1 G/DL (ref 11.9–15.1)
LDL CHOLESTEROL: 112 MG/DL (ref 0–130)
MCH RBC QN AUTO: 29 PG (ref 25.2–33.5)
MCHC RBC AUTO-ENTMCNC: 31 G/DL (ref 28.4–34.8)
MCV RBC AUTO: 93.8 FL (ref 82.6–102.9)
NRBC AUTOMATED: 0 PER 100 WBC
PDW BLD-RTO: 13.2 % (ref 11.8–14.4)
PLATELET # BLD: 414 K/UL (ref 138–453)
PMV BLD AUTO: 10.7 FL (ref 8.1–13.5)
POTASSIUM SERPL-SCNC: 3.9 MMOL/L (ref 3.7–5.3)
RBC # BLD: 4.51 M/UL (ref 3.95–5.11)
SODIUM BLD-SCNC: 139 MMOL/L (ref 135–144)
TOTAL PROTEIN: 7 G/DL (ref 6.4–8.3)
TRIGL SERPL-MCNC: 117 MG/DL
TSH SERPL DL<=0.05 MIU/L-ACNC: 1.34 MIU/L (ref 0.3–5)
VITAMIN B-12: 304 PG/ML (ref 232–1245)
VLDLC SERPL CALC-MCNC: NORMAL MG/DL (ref 1–30)
WBC # BLD: 7.5 K/UL (ref 3.5–11.3)

## 2019-04-03 ENCOUNTER — OFFICE VISIT (OUTPATIENT)
Dept: FAMILY MEDICINE CLINIC | Age: 44
End: 2019-04-03
Payer: COMMERCIAL

## 2019-04-03 VITALS
WEIGHT: 123 LBS | DIASTOLIC BLOOD PRESSURE: 70 MMHG | HEART RATE: 68 BPM | RESPIRATION RATE: 14 BRPM | BODY MASS INDEX: 20.49 KG/M2 | SYSTOLIC BLOOD PRESSURE: 110 MMHG | TEMPERATURE: 98 F | HEIGHT: 65 IN

## 2019-04-03 DIAGNOSIS — F17.210 CIGARETTE SMOKER: ICD-10-CM

## 2019-04-03 DIAGNOSIS — F41.9 ANXIETY: Primary | ICD-10-CM

## 2019-04-03 DIAGNOSIS — Z72.0 TOBACCO ABUSE: ICD-10-CM

## 2019-04-03 DIAGNOSIS — R53.82 CHRONIC FATIGUE: ICD-10-CM

## 2019-04-03 DIAGNOSIS — K92.1 BLOOD IN STOOL: ICD-10-CM

## 2019-04-03 DIAGNOSIS — N92.0 MENORRHAGIA WITH REGULAR CYCLE: ICD-10-CM

## 2019-04-03 DIAGNOSIS — E53.8 VITAMIN B12 DEFICIENCY: ICD-10-CM

## 2019-04-03 PROCEDURE — 99214 OFFICE O/P EST MOD 30 MIN: CPT | Performed by: NURSE PRACTITIONER

## 2019-04-03 RX ORDER — CHOLECALCIFEROL (VITAMIN D3) 125 MCG
500 CAPSULE ORAL DAILY
Qty: 30 TABLET | Refills: 5 | Status: SHIPPED | OUTPATIENT
Start: 2019-04-03 | End: 2019-10-07 | Stop reason: SDUPTHER

## 2019-04-03 ASSESSMENT — ENCOUNTER SYMPTOMS
BLOOD IN STOOL: 1
WHEEZING: 0
VOMITING: 0
CONSTIPATION: 0
EYE REDNESS: 0
ABDOMINAL PAIN: 0
EYE DISCHARGE: 0
NAUSEA: 0
DIARRHEA: 1
EYE PAIN: 0
ABDOMINAL DISTENTION: 1
RECTAL PAIN: 0
COUGH: 0
RHINORRHEA: 0
SORE THROAT: 0
SINUS PRESSURE: 0
SHORTNESS OF BREATH: 0
CHEST TIGHTNESS: 0
BACK PAIN: 0

## 2019-04-03 NOTE — PROGRESS NOTES
Subjective:      Patient ID: Ignacia Choudhary is a 38 y.o. female. Visit Information    Have you changed or started any medications since your last visit including any over-the-counter medicines, vitamins, or herbal medicines? no   Are you having any side effects from any of your medications? -  no  Have you stopped taking any of your medications? Is so, why? -  no    Have you seen any other physician or provider since your last visit? No  Have you had any other diagnostic tests since your last visit? No  Have you been seen in the emergency room and/or had an admission to a hospital since we last saw you? No  Have you had your routine dental cleaning in the past 6 months? no    Have you activated your [employer] account? If not, what are your barriers? Yes     Patient Care Team:  Ramu Garcia MD as PCP - General (Pediatrics)  RUSTY Salvador CNP as PCP - S Attributed Provider    Medical History Review  Past Medical, Family, and Social History reviewed and does contribute to the patient presenting condition    Health Maintenance   Topic Date Due    HIV screen  01/31/1989    Breast cancer screen  10/04/2019    Cervical cancer screen  12/29/2021    Lipid screen  03/27/2024    DTaP/Tdap/Td vaccine (2 - Td) 04/09/2024    Flu vaccine  Completed    Pneumococcal 0-64 years at Risk Vaccine  Completed       PHQ Scores 12/5/2018 12/7/2017 7/12/2016   PHQ2 Score 1 0 0   PHQ9 Score 1 0 0     Interpretation of Total Score DepressionSeverity: 1-4 = Minimal depression, 5-9 = Mild depression, 10-14 = Moderate depression, 15-19 = Moderately severe depression, 20-27 = Severe depression    Current Outpatient Medications   Medication Sig Dispense Refill    vitamin B-12 (CYANOCOBALAMIN) 500 MCG tablet Take 1 tablet by mouth daily 30 tablet 5    ALPRAZolam (XANAX) 0.5 MG tablet Take 1 tablet by mouth 2 times daily as needed for Sleep or Anxiety.  30 tablet 0    Cholecalciferol (VITAMIN D3) 2000 units CAPS Take 1 capsule by mouth daily 30 capsule 0    albuterol (PROVENTIL) (2.5 MG/3ML) 0.083% nebulizer solution Take 3 mLs by nebulization every 6 hours as needed for Wheezing or Shortness of Breath 120 each 1    albuterol (PROAIR HFA) 108 (90 BASE) MCG/ACT inhaler Inhale 2 puffs into the lungs every 6 hours as needed for Wheezing 1 Inhaler 3     No current facility-administered medications for this visit. HPI  Patient presents for follow up on anxiety and sleep issues, patient currently on Xanax as needed, patient states uses Xanax mostly at night to help fall asleep and has been helping, but still just having generalized fatigue and feeling weak, states mood and anxiety is doing well. Patient reports currently on menstrual cycle and has been on it for past 10 days with bleeding and clots. Patient reports having intermittent abdominal pain with bloating and intermittent diarrhea as as well as intermittnet blood in stool for over past year. Patient states she will have blood in stool for a couple days in a row and then it will go awhile for awhile, but episodes have been more frequently recently. Denies any constipation or straining with bowel movement. Patient states there is blood in toilet as well as on toilet paper, states did not see any external hemorrhoids. Review of Systems   Constitutional: Positive for activity change and fatigue. Negative for appetite change and fever. HENT: Negative for congestion, ear discharge, ear pain, postnasal drip, rhinorrhea, sinus pressure and sore throat. Eyes: Negative for pain, discharge, redness and visual disturbance. Respiratory: Negative for cough, chest tightness, shortness of breath and wheezing. Smoking 0.5 PPD or less per day   Cardiovascular: Negative for chest pain, palpitations and leg swelling.    Gastrointestinal: Positive for abdominal distention (on and off), blood in stool (on and off for last year, happening more frequently) and diarrhea (a little at times). Negative for abdominal pain, constipation, nausea, rectal pain and vomiting. Endocrine: Negative for polydipsia, polyphagia and polyuria. Genitourinary: Negative for decreased urine volume, dysuria, flank pain and hematuria. LMP: 3/24/19 - bleeding ever since, heavy with clots. Regular, usually 5-7 days   Musculoskeletal: Negative for arthralgias, back pain, joint swelling, myalgias and neck stiffness. Skin: Negative for rash. Neurological: Positive for weakness. Negative for dizziness, syncope, light-headedness and headaches. Hematological: Negative for adenopathy. Psychiatric/Behavioral: Negative for agitation, decreased concentration and sleep disturbance. The patient is not nervous/anxious and is not hyperactive. Objective:     /70 (Site: Left Upper Arm, Position: Sitting, Cuff Size: Medium Adult)   Pulse 68   Temp 98 °F (36.7 °C) (Tympanic)   Resp 14   Ht 5' 5.25\" (1.657 m)   Wt 123 lb (55.8 kg)   BMI 20.31 kg/m²      Physical Exam   Constitutional: She is oriented to person, place, and time. Vital signs are normal. She appears well-developed and well-nourished. She is cooperative. No distress. HENT:   Head: Atraumatic. Right Ear: Tympanic membrane, external ear and ear canal normal.   Left Ear: Tympanic membrane, external ear and ear canal normal.   Nose: Nose normal.   Mouth/Throat: Uvula is midline, oropharynx is clear and moist and mucous membranes are normal.   Eyes: Pupils are equal, round, and reactive to light. Conjunctivae and lids are normal. Right eye exhibits no discharge. Left eye exhibits no discharge. Neck: Trachea normal and normal range of motion. Neck supple. Cardiovascular: Normal rate, regular rhythm, S1 normal, S2 normal and normal heart sounds. No murmur heard. Pulses:       Radial pulses are 2+ on the right side, and 2+ on the left side.         Posterior tibial pulses are 2+ on the right side, and 2+ on the left side. Pulmonary/Chest: Effort normal and breath sounds normal. No respiratory distress. She has no wheezes. She has no rhonchi. Abdominal: Soft. Bowel sounds are normal. She exhibits no distension. There is no hepatosplenomegaly. There is no tenderness. There is no rebound and no CVA tenderness. Musculoskeletal: Normal range of motion. She exhibits no edema or tenderness. Lymphadenopathy:     She has no cervical adenopathy. Neurological: She is alert and oriented to person, place, and time. She has normal strength. She exhibits normal muscle tone. Coordination normal.   Skin: Skin is warm, dry and intact. No rash noted. No erythema. Psychiatric: She has a normal mood and affect. Her speech is normal and behavior is normal. Thought content normal.   Nursing note and vitals reviewed. Assessment:      Diagnosis Orders   1. Anxiety     2. Blood in stool  [facility] [place], [address], Gastroenterology, [place]   3. Cigarette smoker     4. Chronic fatigue     5. Tobacco abuse     6. Menorrhagia with regular cycle     7.  Vitamin B12 deficiency  vitamin B-12 (CYANOCOBALAMIN) 500 MCG tablet       Lab Results   Component Value Date    WBC 7.5 03/27/2019    HGB 13.1 03/27/2019    HCT 42.3 03/27/2019    MCV 93.8 03/27/2019     03/27/2019       Lab Results   Component Value Date     03/27/2019    K 3.9 03/27/2019     03/27/2019    CO2 24 03/27/2019    BUN 12 03/27/2019    CREATININE 0.64 03/27/2019    GLUCOSE 90 03/27/2019    CALCIUM 9.1 03/27/2019    PROT 7.0 03/27/2019    LABALBU 4.5 03/27/2019    BILITOT 0.41 03/27/2019    ALKPHOS 60 03/27/2019    AST 24 03/27/2019    ALT 19 03/27/2019    LABGLOM >60 03/27/2019    GFRAA >60 03/27/2019       Lab Results   Component Value Date    CHOL 184 03/27/2019    CHOL 191 01/03/2017    CHOL 194 08/29/2014     Lab Results   Component Value Date    TRIG 117 03/27/2019    TRIG 72 01/03/2017    TRIG 77 08/29/2014     Lab Results   Component Value Date    HDL 49 03/27/2019    HDL 58 01/03/2017    HDL 50 08/29/2014     Lab Results   Component Value Date    LDLCHOLESTEROL 112 03/27/2019    LDLCHOLESTEROL 119 01/03/2017    LDLCALC 129 08/29/2014     Lab Results   Component Value Date    VLDL NOT REPORTED 03/27/2019    VLDL NOT REPORTED 01/03/2017     Lab Results   Component Value Date    CHOLHDLRATIO 3.8 03/27/2019    CHOLHDLRATIO 3.3 01/03/2017    CHOLHDLRATIO 3.9 08/29/2014       Plan:     Proceed with GI referral.   Follow up with previous established GYN regarding abnormal menstrual bleeding. Proceed with vitamin B12 supplements. Daily management for anxiety/sleep discussed as well as possibly considering Buspar instead of Xanax-patient will think about it. Discussed side effects and dangers of tobacco on the human body. Discussed impact using tobacco has on loved ones. Discussed options for quitting tobacco and readiness to quit. Discussed plan for managing tobacco dependence. Patient verbalized understanding of what was discussed today in regards to tobacco dependence. Recommend continue current medications, healthy low fat diet, and regular exercise. Monitor for worsening symptoms   Call with concerns   Return in about 3 months (around 7/3/2019) for anxiety. Sarath Pereyra received counseling on the following healthy behaviors: nutrition, exercise, medication adherence and tobacco cessation  Reviewed prior labs and health maintenance  Continue current medications, diet and exercise. Discussed use, benefit, and side effects of prescribed medications. Barriers to medication compliance addressed. Patient given educational materials - see patient instructions  Was a self-tracking handout given in paper form or via [employer]?  No    Requested Prescriptions     Signed Prescriptions Disp Refills    vitamin B-12 (CYANOCOBALAMIN) 500 MCG tablet 30 tablet 5     Sig: Take 1 tablet by mouth daily     Albino Ion student NP present during exam.   All

## 2019-04-03 NOTE — PATIENT INSTRUCTIONS
Patient Education           Anxiety Disorder: Care Instructions   Your Care Instructions      Anxiety is a normal reaction to stress. Difficult situations can cause you to have symptoms such as sweaty palms and a nervous feeling. In an anxiety disorder, the symptoms are far more severe. Constant worry, muscle tension, trouble sleeping, nausea and diarrhea, and other symptoms can make normal daily activities difficult or impossible. These symptoms may occur for no reason, and they can affect your work, school, or social life. Medicines, counseling, and self-care can all help. Follow-up care is a key part of your treatment and safety. Be sure to make and go to all appointments, and call your doctor if you are having problems. It's also a good idea to know your test results and keep a list of the medicines you take. How can you care for yourself at home? · Take medicines exactly as directed. Call your doctor if you think you are having a problem with your medicine. · Go to your counseling sessions and follow-up appointments. · Recognize and accept your anxiety. Then, when you are in a situation that makes you anxious, say to yourself, \"This is not an emergency. I feel uncomfortable, but I am not in danger. I can keep going even if I feel anxious. \"  · Be kind to your body:  ? Relieve tension with exercise or a massage. ? Get enough rest.  ? Avoid alcohol, caffeine, nicotine, and illegal drugs. They can increase your anxiety level and cause sleep problems. ? Learn and do relaxation techniques. See below for more about these techniques. · Engage your mind. Get out and do something you enjoy. Go to a funny movie, or take a walk or hike. Plan your day. Having too much or too little to do can make you anxious. · Keep a record of your symptoms. Discuss your fears with a good friend or family member, or join a support group for people with similar problems. Talking to others sometimes relieves stress.   · Get involved in social groups, or volunteer to help others. Being alone sometimes makes things seem worse than they are. · Get at least 30 minutes of exercise on most days of the week to relieve stress. Walking is a good choice. You also may want to do other activities, such as running, swimming, cycling, or playing tennis or team sports. Relaxation techniques  Do relaxation exercises 10 to 20 minutes a day. You can play soothing, relaxing music while you do them, if you wish. · Tell others in your house that you are going to do your relaxation exercises. Ask them not to disturb you. · Find a comfortable place, away from all distractions and noise. · Lie down on your back, or sit with your back straight. · Focus on your breathing. Make it slow and steady. · Breathe in through your nose. Breathe out through either your nose or mouth. · Breathe deeply, filling up the area between your navel and your rib cage. Breathe so that your belly goes up and down. · Do not hold your breath. · Breathe like this for 5 to 10 minutes. Notice the feeling of calmness throughout your whole body. As you continue to breathe slowly and deeply, relax by doing the following for another 5 to 10 minutes:  · Tighten and relax each muscle group in your body. You can begin at your toes and work your way up to your head. · Imagine your muscle groups relaxing and becoming heavy. · Empty your mind of all thoughts. · Let yourself relax more and more deeply. · Become aware of the state of calmness that surrounds you. · When your relaxation time is over, you can bring yourself back to alertness by moving your fingers and toes and then your hands and feet and then stretching and moving your entire body. Sometimes people fall asleep during relaxation, but they usually wake up shortly afterward.   · Always give yourself time to return to full alertness before you drive a car or do anything that might cause an accident if you are not fully alert. Never play a relaxation tape while you drive a car. When should you call for help? Call 911 anytime you think you may need emergency care. For example, call if:    · You feel you cannot stop from hurting yourself or someone else.   Deejay Alberto the numbers for these national suicide hotlines: 3-047-393-TALK (1-716.957.8984) and 6-198-HJJNQCC (0-779.266.5909). If you or someone you know talks about suicide or feeling hopeless, get help right away.   Watch closely for changes in your health, and be sure to contact your doctor if:    · You have anxiety or fear that affects your life.     · You have symptoms of anxiety that are new or different from those you had before. Where can you learn more? Go to https://PassionTagpePersonSpoteb.FeZo. org and sign in to your Explorra account. Enter P754 in the [employer] box to learn more about \"Anxiety Disorder: Care Instructions. \"     If you do not have an account, please click on the \"Sign Up Now\" link. Current as of: September 11, 2018  Content Version: 11.9  © 4933-7598 [employer]eto. Care instructions adapted under license by [facility] ([facility]). If you have questions about a medical condition or this instruction, always ask your healthcare professional. [place] [address] any warranty or liability for your use of this information. Patient Education        Fatigue: Care Instructions  Your Care Instructions    Fatigue is a feeling of tiredness, exhaustion, or lack of energy. You may feel fatigue because of too much or not enough activity. It can also come from stress, lack of sleep, boredom, and poor diet. Many medical problems, such as viral infections, can cause fatigue. Emotional problems, especially depression, are often the cause of fatigue. Fatigue is most often a symptom of another problem. Treatment for fatigue depends on the cause.  For example, if you have fatigue because you have a certain health problem, treating this problem also treats your fatigue. If depression or anxiety is the cause, treatment may help. Follow-up care is a key part of your treatment and safety. Be sure to make and go to all appointments, and call your doctor if you are having problems. It's also a good idea to know your test results and keep a list of the medicines you take. How can you care for yourself at home? · Get regular exercise. But don't overdo it. Go back and forth between rest and exercise. · Get plenty of rest.  · Eat a healthy diet. Do not skip meals, especially breakfast.  · Reduce your use of caffeine, tobacco, and alcohol. Caffeine is most often found in coffee, tea, cola drinks, and chocolate. · Limit medicines that can cause fatigue. This includes tranquilizers and cold and allergy medicines. When should you call for help? Watch closely for changes in your health, and be sure to contact your doctor if:    · You have new symptoms such as fever or a rash.     · Your fatigue gets worse.     · You have been feeling down, depressed, or hopeless. Or you may have lost interest in things that you usually enjoy.     · You are not getting better as expected. Where can you learn more? Go to https://CommProve.deCarta. org and sign in to your UniQure account. Enter Y090 in the V[employer]le [employer] in Motion box to learn more about \"Fatigue: Care Instructions. \"     If you do not have an account, please click on the \"Sign Up Now\" link. Current as of: September 23, 2018  Content Version: 11.9  © 3213-0256 [employer]. Care instructions adapted under license by [facility] ([facility]). If you have questions about a medical condition or this instruction, always ask your healthcare professional. [place] [address] any warranty or liability for your use of this information.          Patient Education        Lower Gastrointestinal Bleeding: Care Instructions  Your Care Instructions    The digestive or gastrointestinal tract goes from the mouth to the anus. It is often called the GI tract. Bleeding in the lower GI tract can happen anywhere in your small or large intestine. It can also happen in your rectum or anus. In some cases, it is caused by an infection, cancer, or inflammatory bowel disease. Or it may be caused by hemorrhoids, diverticulitis, or clotting problems. Light bleeding may not cause any symptoms at first. But if you continue to bleed for a while, you may feel very weak or tired. Sudden, heavy bleeding means you need to see a doctor right away. This kind of bleeding can be very dangerous. But it can usually be cured or controlled. The doctor may do some tests to find the cause of your bleeding. Follow-up care is a key part of your treatment and safety. Be sure to make and go to all appointments, and call your doctor if you are having problems. It's also a good idea to know your test results and keep a list of the medicines you take. How can you care for yourself at home? · Be safe with medicines. Take your medicines exactly as prescribed. Call your doctor if you think you are having a problem with your medicine. You will get more details on the specific medicines your doctor prescribes. · Do not take aspirin or other anti-inflammatory medicines, such as naproxen (Aleve) or ibuprofen (Advil, Motrin), without talking to your doctor first. Ask your doctor if it is okay to use acetaminophen (Tylenol). · Do not drink alcohol. · The bleeding may make you lose iron. So it's important to eat foods that have a lot of iron. These include red meat, shellfish, poultry, and eggs. They also include beans, raisins, whole-grain breads, and leafy green vegetables. If you want help planning meals, you can meet with a dietitian. When should you call for help? Call 911 anytime you think you may need emergency care. For example, call if:    · You have sudden, severe belly pain.     · You vomit blood or what looks like coffee grounds.   · You passed out (lost consciousness).     · Your stools are maroon or very bloody.    Call your doctor now or seek immediate medical care if:    · You are dizzy or lightheaded, or you feel like you may faint.     · Your stools are black and look like tar, or they have streaks of blood.     · You have belly pain.     · You vomit or have nausea.    Watch closely for changes in your health, and be sure to contact your doctor if you do not get better as expected. Where can you learn more? Go to https://NORCATpepiceweb.Rover.com. org and sign in to your Metabar account. Enter P492 in the [employer] box to learn more about \"Lower Gastrointestinal Bleeding: Care Instructions. \"     If you do not have an account, please click on the \"Sign Up Now\" link. Current as of: September 23, 2018  Content Version: 11.9  © 0872-8736 Yorder. Care instructions adapted under license by [facility] ([facility]). If you have questions about a medical condition or this instruction, always ask your healthcare professional. [place] [address] any warranty or liability for your use of this information. Patient Education        Abnormal Uterine Bleeding: Care Instructions  Your Care Instructions    Abnormal uterine bleeding (AUB) is irregular bleeding from the uterus that is longer or heavier than usual or does not occur at your regular time. Sometimes it is caused by changes in hormone levels. It can also be caused by growths in the uterus, such as fibroids or polyps. Sometimes a cause cannot be found. You may have heavy bleeding when you are not expecting your period. Your doctor may suggest a pregnancy test, if you think you are pregnant. Follow-up care is a key part of your treatment and safety. Be sure to make and go to all appointments, and call your doctor if you are having problems. It's also a good idea to know your test results and keep a list of the medicines you take.   How can sick less. For babies and small children, living smoke-free means they're less likely to have ear infections, pneumonia, and bronchitis. · If you're a woman who is or will be pregnant someday, quitting smoking means a healthier . · Children who are close to you are less likely to become adult smokers. Where can you learn more? Go to https://Shopmiumpealexa.healthRingMD. org and sign in to your Datacratic account. Enter [id number] [id number] [phone] in the Mobile [employer] box to learn more about \"Learning About Benefits From Quitting Smoking. \"     If you do not have an account, please click on the \"Sign Up Now\" link. Current as of: 2018  Content Version: 11.9  © 4491-4880 365webcall, Incorporated. Care instructions adapted under license by [facility] ([facility]). If you have questions about a medical condition or this instruction, always ask your healthcare professional. [place] [address] any warranty or liability for your use of this information.

## 2019-04-14 DIAGNOSIS — F41.9 ANXIETY: ICD-10-CM

## 2019-04-17 RX ORDER — ALPRAZOLAM 0.5 MG/1
0.5 TABLET ORAL 2 TIMES DAILY PRN
Qty: 30 TABLET | Refills: 0 | Status: SHIPPED | OUTPATIENT
Start: 2019-04-17 | End: 2019-05-16 | Stop reason: SDUPTHER

## 2019-05-16 DIAGNOSIS — F41.9 ANXIETY: ICD-10-CM

## 2019-05-16 RX ORDER — ALPRAZOLAM 0.5 MG/1
0.5 TABLET ORAL 2 TIMES DAILY PRN
Qty: 30 TABLET | Refills: 0 | Status: SHIPPED | OUTPATIENT
Start: 2019-05-16 | End: 2019-06-14 | Stop reason: SDUPTHER

## 2019-05-16 NOTE — TELEPHONE ENCOUNTER
LRF 4-17-19 # 30 RF 0  LOV 4-3-19  RTO 3 mo    Health Maintenance   Topic Date Due    HIV screen  01[birth year]31/1989    Breast cancer screen  10/04/2019    Cervical cancer screen  12/29/2021    Lipid screen  03/27/2024    DTaP/Tdap/Td vaccine (2 - Td) 04/09/2024    Flu vaccine  Completed    Pneumococcal 0-64 years Vaccine  Completed             (applicable per patient's age: Cancer Screenings, Depression Screening, Fall Risk Screening, Immunizations)    LDL Cholesterol (mg/dL)   Date Value   03/27/2019 112     LDL Calculated (mg/dL)   Date Value   08/29/2014 129     AST (U/L)   Date Value   03/27/2019 24     ALT (U/L)   Date Value   03/27/2019 19     BUN (mg/dL)   Date Value   03/27/2019 12      (goal A1C is < 7)   (goal LDL is <100) need 30-50% reduction from baseline     BP Readings from Last 3 Encounters:   04/03/19 110/70   01/21/19 116/74   12/05/18 112/64    (goal /80)      All Future Testing planned in [facility]:  Lab Frequency Next Occurrence   MRI BREAST BILATERAL W WO CONTRAST Once 01/23/2019       Next Visit Date:  No future appointments.          Patient Active Problem List:     Facet arthritis of lumbar region [facility])     Spondylolisthesis at L4-L5 level     Acute right-sided low back pain with right-sided sciatica     Tobacco abuse     Cigarette smoker     Nephrolithiasis

## 2019-06-14 ENCOUNTER — TELEPHONE (OUTPATIENT)
Dept: FAMILY MEDICINE CLINIC | Age: 44
End: 2019-06-14

## 2019-06-14 DIAGNOSIS — R06.2 WHEEZING: ICD-10-CM

## 2019-06-14 DIAGNOSIS — R05.9 COUGH: ICD-10-CM

## 2019-06-14 DIAGNOSIS — F41.9 ANXIETY: ICD-10-CM

## 2019-06-14 DIAGNOSIS — J20.9 ACUTE BRONCHITIS, UNSPECIFIED ORGANISM: Primary | ICD-10-CM

## 2019-06-14 RX ORDER — PREDNISONE 20 MG/1
TABLET ORAL
Qty: 30 TABLET | Refills: 0 | Status: SHIPPED | OUTPATIENT
Start: 2019-06-14 | End: 2019-06-24

## 2019-06-14 RX ORDER — AZITHROMYCIN 250 MG/1
TABLET, FILM COATED ORAL
Qty: 6 TABLET | Refills: 0 | Status: SHIPPED | OUTPATIENT
Start: 2019-06-14 | End: 2019-06-24

## 2019-06-14 RX ORDER — ALPRAZOLAM 0.5 MG/1
0.5 TABLET ORAL NIGHTLY PRN
Qty: 30 TABLET | Refills: 0 | Status: SHIPPED | OUTPATIENT
Start: 2019-06-14 | End: 2019-07-15 | Stop reason: SDUPTHER

## 2019-06-14 NOTE — TELEPHONE ENCOUNTER
Will sent zpack and steroid to pharmacy for her. Continue albuterol aerosols.  If no improvement, will need appt here and likely chest xray

## 2019-06-14 NOTE — TELEPHONE ENCOUNTER
LRF # [id number] RF [id number]  LOV 4-3-19  RTO 3 mo    Health Maintenance   Topic Date Due    HIV screen  01/31/1989    Breast cancer screen  10/04/2019    Cervical cancer screen  12/29/2021    Lipid screen  03/27/2024    DTaP/Tdap/Td vaccine (2 - Td) 04/09/2024    Flu vaccine  Completed    Pneumococcal 0-64 years Vaccine  Completed             (applicable per patient's age: Cancer Screenings, Depression Screening, Fall Risk Screening, Immunizations)    LDL Cholesterol (mg/dL)   Date Value   03/27/2019 112     LDL Calculated (mg/dL)   Date Value   08/29/2014 129     AST (U/L)   Date Value   03/27/2019 24     ALT (U/L)   Date Value   03/27/2019 19     BUN (mg/dL)   Date Value   03/27/2019 12      (goal A1C is < 7)   (goal LDL is <100) need 30-50% reduction from baseline     BP Readings from Last 3 Encounters:   04/03/19 110/70   01/21/19 116/74   12/05/18 112/64    (goal /80)      All Future Testing planned in [facility]:  Lab Frequency Next Occurrence   MRI BREAST BILATERAL W WO CONTRAST Once 01/23/2019       Next Visit Date:  No future appointments.          Patient Active Problem List:     Facet arthritis of lumbar region     Spondylolisthesis at L4-L5 level     Acute right-sided low back pain with right-sided sciatica     Tobacco abuse     Cigarette smoker     Nephrolithiasis

## 2019-06-14 NOTE — TELEPHONE ENCOUNTER
Patient contacted the office requesting antibiotic or steroid medication due to possible acute bronchitis flare up. States she has been seen previously for this issue. Does not want it to turn into walking pneumonia. Symptoms started 1 week ago and worsening. Deep cough with green mucus. Using albuterol nebulizer with no improvements. At work currently and would like a call back if this is possible to prescribe. Patient ok'd appointment if needed. Contact back at work number [phone] cde [phone] or cell.

## 2019-08-02 DIAGNOSIS — Z80.3 FAMILY HISTORY OF BREAST CANCER: ICD-10-CM

## 2019-08-02 DIAGNOSIS — R92.2 DENSE BREAST: ICD-10-CM

## 2019-08-15 DIAGNOSIS — F41.9 ANXIETY: ICD-10-CM

## 2019-08-19 RX ORDER — ALPRAZOLAM 0.5 MG/1
0.5 TABLET ORAL DAILY PRN
Qty: 30 TABLET | Refills: 0 | Status: SHIPPED | OUTPATIENT
Start: 2019-08-19 | End: 2019-09-18 | Stop reason: SDUPTHER

## 2019-09-09 ENCOUNTER — TELEPHONE (OUTPATIENT)
Dept: FAMILY MEDICINE CLINIC | Age: 44
End: 2019-09-09

## 2019-09-09 NOTE — TELEPHONE ENCOUNTER
Patient is requesting blood work prior to appointment on 10/4/19 , she thinks she may be anemic, contact patient once orders are put in.  [phone]

## 2019-09-18 DIAGNOSIS — F41.9 ANXIETY: ICD-10-CM

## 2019-09-18 RX ORDER — ALPRAZOLAM 0.5 MG/1
0.5 TABLET ORAL DAILY PRN
Qty: 30 TABLET | Refills: 0 | Status: SHIPPED | OUTPATIENT
Start: 2019-09-18 | End: 2019-10-18 | Stop reason: SDUPTHER

## 2019-10-04 ENCOUNTER — OFFICE VISIT (OUTPATIENT)
Dept: FAMILY MEDICINE CLINIC | Age: 44
End: 2019-10-04
Payer: COMMERCIAL

## 2019-10-04 VITALS
WEIGHT: 118.2 LBS | SYSTOLIC BLOOD PRESSURE: 120 MMHG | DIASTOLIC BLOOD PRESSURE: 70 MMHG | OXYGEN SATURATION: 99 % | RESPIRATION RATE: 16 BRPM | HEART RATE: 88 BPM | HEIGHT: 66 IN | BODY MASS INDEX: 18.99 KG/M2 | TEMPERATURE: 98.8 F

## 2019-10-04 DIAGNOSIS — F41.9 ANXIETY: Primary | ICD-10-CM

## 2019-10-04 DIAGNOSIS — F17.210 CIGARETTE SMOKER: ICD-10-CM

## 2019-10-04 DIAGNOSIS — R53.82 CHRONIC FATIGUE: ICD-10-CM

## 2019-10-04 PROCEDURE — 99214 OFFICE O/P EST MOD 30 MIN: CPT | Performed by: NURSE PRACTITIONER

## 2019-10-04 ASSESSMENT — ENCOUNTER SYMPTOMS
ABDOMINAL PAIN: 0
VOMITING: 0
EYE REDNESS: 0
SHORTNESS OF BREATH: 0
WHEEZING: 0
SINUS PRESSURE: 0
BLOOD IN STOOL: 0
ABDOMINAL DISTENTION: 0
SORE THROAT: 0
EYE DISCHARGE: 0
CHEST TIGHTNESS: 0
BACK PAIN: 0
RHINORRHEA: 0
EYE PAIN: 0
COUGH: 0
NAUSEA: 0
DIARRHEA: 0

## 2019-10-04 ASSESSMENT — PATIENT HEALTH QUESTIONNAIRE - PHQ9
SUM OF ALL RESPONSES TO PHQ QUESTIONS 1-9: 0
2. FEELING DOWN, DEPRESSED OR HOPELESS: 0
SUM OF ALL RESPONSES TO PHQ9 QUESTIONS 1 & 2: 0
SUM OF ALL RESPONSES TO PHQ QUESTIONS 1-9: 0
1. LITTLE INTEREST OR PLEASURE IN DOING THINGS: 0

## 2019-10-07 DIAGNOSIS — E53.8 VITAMIN B12 DEFICIENCY: ICD-10-CM

## 2019-12-18 DIAGNOSIS — F41.9 ANXIETY: ICD-10-CM

## 2019-12-18 RX ORDER — ALPRAZOLAM 0.5 MG/1
0.5 TABLET ORAL DAILY PRN
Qty: 30 TABLET | Refills: 0 | OUTPATIENT
Start: 2019-12-18 | End: 2020-12-17

## 2019-12-18 RX ORDER — ALPRAZOLAM 0.5 MG/1
0.5 TABLET ORAL DAILY PRN
Qty: 30 TABLET | Refills: 0 | Status: SHIPPED | OUTPATIENT
Start: 2019-12-18 | End: 2020-01-17 | Stop reason: SDUPTHER

## 2020-01-17 RX ORDER — ALPRAZOLAM 0.5 MG/1
0.5 TABLET ORAL DAILY PRN
Qty: 30 TABLET | Refills: 0 | Status: SHIPPED | OUTPATIENT
Start: 2020-01-17 | End: 2020-02-15 | Stop reason: SDUPTHER

## 2020-01-17 NOTE — TELEPHONE ENCOUNTER
ZCJ:95-1-28  NOQ:7-79-23  CBO:65-24-09  Health Maintenance   Topic Date Due    HIV screen  01/31/1989    Breast cancer screen  10/04/2019    Cervical cancer screen  12/29/2021    Lipid screen  03/27/2024    DTaP/Tdap/Td vaccine (2 - Td) 04/09/2024    Flu vaccine  Completed    Pneumococcal 0-64 years Vaccine  Completed             (applicable per patient's age: Cancer Screenings, Depression Screening, Fall Risk Screening, Immunizations)    LDL Cholesterol (mg/dL)   Date Value   03/27/2019 112     LDL Calculated (mg/dL)   Date Value   08/29/2014 129     AST (U/L)   Date Value   03/27/2019 24     ALT (U/L)   Date Value   03/27/2019 19     BUN (mg/dL)   Date Value   03/27/2019 12      (goal A1C is < 7)   (goal LDL is <100) need 30-50% reduction from baseline     BP Readings from Last 3 Encounters:   10/04/19 120/70   04/03/19 110/70   01/21/19 116/74    (goal /80)      All Future Testing planned in [facility]:      Next Visit Date:  Future Appointments   Date Time Provider Yolanda Jordan   1/22/2020  5:00 PM RUSTY Caputo - MEGGAN Conti            Patient Active Problem List:     Facet arthritis of lumbar region     Spondylolisthesis at L4-L5 level     Acute right-sided low back pain with right-sided sciatica     Tobacco abuse     Cigarette smoker     Nephrolithiasis

## 2020-01-22 ENCOUNTER — OFFICE VISIT (OUTPATIENT)
Dept: FAMILY MEDICINE CLINIC | Age: 45
End: 2020-01-22
Payer: COMMERCIAL

## 2020-01-22 VITALS
OXYGEN SATURATION: 98 % | WEIGHT: 121.7 LBS | TEMPERATURE: 98.5 F | SYSTOLIC BLOOD PRESSURE: 118 MMHG | RESPIRATION RATE: 16 BRPM | DIASTOLIC BLOOD PRESSURE: 70 MMHG | BODY MASS INDEX: 19.79 KG/M2 | HEART RATE: 74 BPM

## 2020-01-22 PROCEDURE — 99213 OFFICE O/P EST LOW 20 MIN: CPT | Performed by: NURSE PRACTITIONER

## 2020-01-22 PROCEDURE — 99406 BEHAV CHNG SMOKING 3-10 MIN: CPT | Performed by: NURSE PRACTITIONER

## 2020-01-22 ASSESSMENT — ENCOUNTER SYMPTOMS
ABDOMINAL DISTENTION: 0
SHORTNESS OF BREATH: 0
CHEST TIGHTNESS: 0
EYE PAIN: 0
BLOOD IN STOOL: 0
SINUS PRESSURE: 0
BACK PAIN: 0
RHINORRHEA: 0
NAUSEA: 0
EYE DISCHARGE: 0
COUGH: 0
SORE THROAT: 0
ABDOMINAL PAIN: 0
DIARRHEA: 0
VOMITING: 0
WHEEZING: 0
EYE REDNESS: 0

## 2020-01-22 NOTE — PROGRESS NOTES
History of Present Illness:     Ramona Sherman is a 38 y.o. female who presents in office today with Self medication specific follow up for xanax. F/U for anxiety. Presents to office today to follow-up on her anxiety and sleep. Using Xanax nightly to help her sleep and is working well. She is getting good sleep however still feeling fatigued during the day. Vitamin B-12 was low last year, on a double dose of this now. 1000 mcg of this daily. Has no other concerns today. HPI    Patient Care Team:  RUSTY Downey - CNP as PCP - General (Nurse Practitioner)  Lynn Lim MD as PCP - [facility] Provider    Visit Information    Have you changed or started any medications since your last visit including any over-the-counter medicines, vitamins, or herbal medicines? no   Are you having any side effects from any of your medications? -  no  Have you stopped taking any of your medications? Is so, why? -  no  Have you seen any other physician or provider since your last visit? Yes - Records Obtained Urology   Have you had any other diagnostic tests since your last visit? Yes - Records Obtained  Have you been seen in the emergency room and/or had an admission to a hospital since we last saw you? No  Have you had your routine dental cleaning in the past 6 months? Yes  Have you activated your [facility] account? Yes  If activated, Do you have the mobile curtis and comfortable using functions? No    Reviewed     [x] Past Medical, Family, and Social History was reviewed per writer and does contribute to the patient presenting condition.     [x] Laboratory Results, Vital signs, Imaging, Active Problems, Immunizations, Current/Recently Discontinued Medications, Health Maintenance Activities Due, Referral Notes (if available) were reviewed per writer     [x] Reviewed Depression screening if taken or valid today or any other valid screening tool (others seen below) Interpretation of Total Score (Exact Date)   SpO2 98%   Breastfeeding No   BMI 19.79 kg/m²      Physical Exam  Vitals signs and nursing note reviewed. Constitutional:       General: She is not in acute distress. Appearance: She is well-developed. HENT:      Head: Atraumatic. Right Ear: Tympanic membrane, ear canal and external ear normal.      Left Ear: Tympanic membrane, ear canal and external ear normal.      Nose: Nose normal.      Mouth/Throat:      Pharynx: Uvula midline. Eyes:      General: Lids are normal.         Right eye: No discharge. Left eye: No discharge. Conjunctiva/sclera: Conjunctivae normal.      Pupils: Pupils are equal, round, and reactive to light. Neck:      Musculoskeletal: Normal range of motion and neck supple. Trachea: Trachea normal.   Cardiovascular:      Rate and Rhythm: Normal rate and regular rhythm. Pulses:           Radial pulses are 2+ on the right side and 2+ on the left side. Posterior tibial pulses are 2+ on the right side and 2+ on the left side. Heart sounds: Normal heart sounds, S1 normal and S2 normal. No murmur. Pulmonary:      Effort: Pulmonary effort is normal. No respiratory distress. Breath sounds: Normal breath sounds. No wheezing or rhonchi. Abdominal:      General: Bowel sounds are normal. There is no distension. Palpations: Abdomen is soft. Tenderness: There is no tenderness. There is no rebound. Musculoskeletal: Normal range of motion. General: No tenderness. Lymphadenopathy:      Cervical: No cervical adenopathy. Skin:     General: Skin is warm and dry. Capillary Refill: Capillary refill takes less than 2 seconds. Findings: No erythema or rash. Neurological:      Mental Status: She is alert and oriented to person, place, and time. Motor: No abnormal muscle tone.       Coordination: Coordination normal.   Psychiatric:         Speech: Speech normal.         Behavior: Behavior normal. Behavior

## 2020-01-30 LAB
VITAMIN B-12: 1448
VITAMIN D 25-HYDROXY: 44.7
VITAMIN D2, 25 HYDROXY: NORMAL
VITAMIN D3,25 HYDROXY: NORMAL

## 2020-01-31 DIAGNOSIS — E55.9 VITAMIN D DEFICIENCY: ICD-10-CM

## 2020-02-24 ENCOUNTER — TELEPHONE (OUTPATIENT)
Dept: FAMILY MEDICINE CLINIC | Age: 45
End: 2020-02-24

## 2020-02-25 RX ORDER — AZITHROMYCIN 250 MG/1
TABLET, FILM COATED ORAL
Qty: 6 TABLET | Refills: 0 | Status: SHIPPED | OUTPATIENT
Start: 2020-02-25 | End: 2020-03-06

## 2020-02-25 RX ORDER — METHYLPREDNISOLONE 4 MG/1
TABLET ORAL
Qty: 1 KIT | Refills: 0 | Status: SHIPPED | OUTPATIENT
Start: 2020-02-25 | End: 2020-03-02

## 2020-02-26 ENCOUNTER — PATIENT MESSAGE (OUTPATIENT)
Dept: FAMILY MEDICINE CLINIC | Age: 45
End: 2020-02-26

## 2020-02-26 DIAGNOSIS — Z12.31 ENCOUNTER FOR SCREENING MAMMOGRAM FOR MALIGNANT NEOPLASM OF BREAST: ICD-10-CM

## 2020-02-26 RX ORDER — FLUCONAZOLE 150 MG/1
150 TABLET ORAL DAILY
Qty: 3 TABLET | Refills: 0 | Status: SHIPPED | OUTPATIENT
Start: 2020-02-26 | End: 2020-02-29

## 2020-02-26 NOTE — TELEPHONE ENCOUNTER
From: Win Parnell  To: Odalys Sanchez, APRN - CNP  Sent: 2/26/2020 11:47 AM EST  Subject: Prescription Question    Wondering if you could call in the magic yeast infection pill? I should of thought of that when asking for antibiotic.     Thank you so much!!

## 2020-03-17 RX ORDER — ALPRAZOLAM 0.5 MG/1
0.5 TABLET ORAL DAILY PRN
Qty: 30 TABLET | Refills: 0 | Status: SHIPPED | OUTPATIENT
Start: 2020-03-17 | End: 2020-04-16 | Stop reason: SDUPTHER

## 2020-03-17 NOTE — TELEPHONE ENCOUNTER
Last visit: 1/22/20  Last fill: 2/18/20  Oars please review today     Next Visit Date:  No future appointments.     Health Maintenance   Topic Date Due    Breast cancer screen  02/22/2021    Cervical cancer screen  12/29/2021    Shingles Vaccine (1 of 2) 01/31/2024    Lipid screen  03/27/2024    DTaP/Tdap/Td vaccine (2 - Td) 04/09/2024    Flu vaccine  Completed    Pneumococcal 0-64 years Vaccine  Completed    Hepatitis A vaccine  Aged Out    Hepatitis B vaccine  Aged Out    Hib vaccine  Aged Out    Meningococcal (ACWY) vaccine  Aged Out    HIV screen  Discontinued       No results found for: LABA1C          ( goal A1C is < 7)   No results found for: LABMICR  LDL Cholesterol (mg/dL)   Date Value   03/27/2019 112   01/03/2017 119     LDL Calculated (mg/dL)   Date Value   08/29/2014 129       (goal LDL is <100)   AST (U/L)   Date Value   03/27/2019 24     ALT (U/L)   Date Value   03/27/2019 19     BUN (mg/dL)   Date Value   03/27/2019 12     BP Readings from Last 3 Encounters:   01/22/20 118/70   10/04/19 120/70   04/03/19 110/70          (goal 120/80)    All Future Testing planned in [facility]              Patient Active Problem List:     Facet arthritis of lumbar region     Spondylolisthesis at L4-L5 level     Acute right-sided low back pain with right-sided sciatica     Tobacco abuse     Cigarette smoker     Nephrolithiasis

## 2020-05-13 ENCOUNTER — OFFICE VISIT (OUTPATIENT)
Dept: FAMILY MEDICINE CLINIC | Age: 45
End: 2020-05-13
Payer: COMMERCIAL

## 2020-05-13 VITALS
BODY MASS INDEX: 20.46 KG/M2 | RESPIRATION RATE: 12 BRPM | OXYGEN SATURATION: 99 % | HEART RATE: 87 BPM | TEMPERATURE: 99.6 F | DIASTOLIC BLOOD PRESSURE: 72 MMHG | WEIGHT: 122.8 LBS | SYSTOLIC BLOOD PRESSURE: 107 MMHG | HEIGHT: 65 IN

## 2020-05-13 PROCEDURE — 99214 OFFICE O/P EST MOD 30 MIN: CPT | Performed by: NURSE PRACTITIONER

## 2020-05-13 RX ORDER — ALPRAZOLAM 0.5 MG/1
0.5 TABLET ORAL DAILY PRN
Qty: 30 TABLET | Refills: 0 | Status: SHIPPED | OUTPATIENT
Start: 2020-05-13 | End: 2020-06-12 | Stop reason: SDUPTHER

## 2020-05-13 SDOH — ECONOMIC STABILITY: INCOME INSECURITY: HOW HARD IS IT FOR YOU TO PAY FOR THE VERY BASICS LIKE FOOD, HOUSING, MEDICAL CARE, AND HEATING?: NOT HARD AT ALL

## 2020-05-13 SDOH — ECONOMIC STABILITY: TRANSPORTATION INSECURITY
IN THE PAST 12 MONTHS, HAS THE LACK OF TRANSPORTATION KEPT YOU FROM MEDICAL APPOINTMENTS OR FROM GETTING MEDICATIONS?: NO

## 2020-05-13 SDOH — ECONOMIC STABILITY: TRANSPORTATION INSECURITY
IN THE PAST 12 MONTHS, HAS LACK OF TRANSPORTATION KEPT YOU FROM MEETINGS, WORK, OR FROM GETTING THINGS NEEDED FOR DAILY LIVING?: NO

## 2020-05-13 SDOH — ECONOMIC STABILITY: FOOD INSECURITY: WITHIN THE PAST 12 MONTHS, YOU WORRIED THAT YOUR FOOD WOULD RUN OUT BEFORE YOU GOT MONEY TO BUY MORE.: NEVER TRUE

## 2020-05-13 SDOH — ECONOMIC STABILITY: FOOD INSECURITY: WITHIN THE PAST 12 MONTHS, THE FOOD YOU BOUGHT JUST DIDN'T LAST AND YOU DIDN'T HAVE MONEY TO GET MORE.: NEVER TRUE

## 2020-05-13 NOTE — PROGRESS NOTES
10-14 = Moderate depression, 15-19 = Moderately severe depression, 20-27 = Severe depression    Current Outpatient Medications   Medication Sig Dispense Refill    ALPRAZolam (XANAX) 0.5 MG tablet Take 1 tablet by mouth daily as needed for Sleep or Anxiety. 30 tablet 0    vitamin B-12 1000 MCG tablet Take 1 tablet by mouth daily 30 tablet 5    Cholecalciferol (VITAMIN D3) 2000 units CAPS Take 1 capsule by mouth daily 30 capsule 0    albuterol (PROVENTIL) (2.5 MG/3ML) 0.083% nebulizer solution Take 3 mLs by nebulization every 6 hours as needed for Wheezing or Shortness of Breath 120 each 1    albuterol (PROAIR HFA) 108 (90 BASE) MCG/ACT inhaler Inhale 2 puffs into the lungs every 6 hours as needed for Wheezing 1 Inhaler 3     No current facility-administered medications for this visit. Presents to office today for routine follow-up related to anxiety, hyperlipidemia, vitamin D deficiency, difficulty sleeping. Originally a patient of Dr. Tian French, then followed with Eran Reina NP and Jade Sawant NP. Takes xanax at night for sleep. Helps to calm her head and makes thoughts stop. Sleeps good. appetite good. Normal bowel and bladder pattern. mammo and MRI alternating every 6 months. Strong family history of breast cancer. Current smoker. Is trying to cut back. Due for routine labs. Review of Systems   Constitutional: Positive for fatigue. Negative for activity change, appetite change and fever. HENT: Negative for congestion, ear discharge, ear pain, postnasal drip, rhinorrhea, sinus pressure and sore throat. Eyes: Negative for pain, discharge and redness. Respiratory: Negative for cough, chest tightness, shortness of breath and wheezing. Cardiovascular: Negative for chest pain, palpitations and leg swelling. Gastrointestinal: Negative for abdominal distention, abdominal pain, blood in stool, diarrhea, nausea and vomiting.    Endocrine: Negative for polydipsia, polyphagia and

## 2020-05-23 ASSESSMENT — ENCOUNTER SYMPTOMS
BLOOD IN STOOL: 0
SINUS PRESSURE: 0
EYE PAIN: 0
ABDOMINAL DISTENTION: 0
EYE REDNESS: 0
SORE THROAT: 0
EYE DISCHARGE: 0
VOMITING: 0
RHINORRHEA: 0
COUGH: 0
ABDOMINAL PAIN: 0
CHEST TIGHTNESS: 0
DIARRHEA: 0
NAUSEA: 0
SHORTNESS OF BREATH: 0
WHEEZING: 0
BACK PAIN: 0

## 2020-06-12 RX ORDER — ALPRAZOLAM 0.5 MG/1
0.5 TABLET ORAL DAILY PRN
Qty: 30 TABLET | Refills: 0 | Status: SHIPPED | OUTPATIENT
Start: 2020-06-12 | End: 2020-07-13 | Stop reason: SDUPTHER

## 2020-06-12 NOTE — TELEPHONE ENCOUNTER
Lov: 5/13/2  Lrf: 5/12/20  Na: 8/12/20  Health Maintenance   Topic Date Due    Breast cancer screen  02/22/2021    Cervical cancer screen  12/29/2021    Lipid screen  03/27/2024    DTaP/Tdap/Td vaccine (2 - Td) 04/09/2024    Flu vaccine  Completed    Pneumococcal 0-64 years Vaccine  Completed    Hepatitis A vaccine  Aged Out    Hepatitis B vaccine  Aged Out    Hib vaccine  Aged Out    Meningococcal (ACWY) vaccine  Aged Out    HIV screen  Discontinued             (applicable per patient's age: Cancer Screenings, Depression Screening, Fall Risk Screening, Immunizations)    LDL Cholesterol (mg/dL)   Date Value   03/27/2019 112     LDL Calculated (mg/dL)   Date Value   08/29/2014 129     AST (U/L)   Date Value   03/27/2019 24     ALT (U/L)   Date Value   03/27/2019 19     BUN (mg/dL)   Date Value   03/27/2019 12      (goal A1C is < 7)   (goal LDL is <100) need 30-50% reduction from baseline     BP Readings from Last 3 Encounters:   05/13/20 107/72   01/22/20 118/70   10/04/19 120/70    (goal /80)      All Future Testing planned in [facility]:  Lab Frequency Next Occurrence   CBC Once 05/13/2020   Comprehensive Metabolic Panel Once 77/[id number]/7902       Next Visit Date:  Future Appointments   Date Time Provider Yolanda Jordan   8/12/2020  5:00 PM RUSTY Jalloh - LUIS [facility]            Patient Active Problem List:     Facet arthritis of lumbar region     Spondylolisthesis at L4-L5 level     Acute right-sided low back pain with right-sided sciatica     Tobacco abuse     Cigarette smoker     Nephrolithiasis

## 2020-07-08 LAB
ALBUMIN SERPL-MCNC: 4.1 G/DL
ALP BLD-CCNC: 50 U/L
ALT SERPL-CCNC: 24 U/L
ANION GAP SERPL CALCULATED.3IONS-SCNC: 7 MMOL/L
AST SERPL-CCNC: 28 U/L
BASOPHILS ABSOLUTE: NORMAL
BASOPHILS RELATIVE PERCENT: NORMAL
BILIRUB SERPL-MCNC: 0.4 MG/DL (ref 0.1–1.4)
BUN BLDV-MCNC: 17 MG/DL
CALCIUM SERPL-MCNC: 8.7 MG/DL
CHLORIDE BLD-SCNC: 104 MMOL/L
CHOLESTEROL, TOTAL: 192 MG/DL
CHOLESTEROL/HDL RATIO: 3.5
CO2: 27 MMOL/L
CREAT SERPL-MCNC: 0.72 MG/DL
EOSINOPHILS ABSOLUTE: NORMAL
EOSINOPHILS RELATIVE PERCENT: NORMAL
GFR CALCULATED: 87
GLUCOSE BLD-MCNC: 93 MG/DL
HCT VFR BLD CALC: 40.9 % (ref 36–46)
HDLC SERPL-MCNC: 55 MG/DL (ref 35–70)
HEMOGLOBIN: 13.4 G/DL (ref 12–16)
LDL CHOLESTEROL CALCULATED: 120 MG/DL (ref 0–160)
LYMPHOCYTES ABSOLUTE: NORMAL
LYMPHOCYTES RELATIVE PERCENT: NORMAL
MCH RBC QN AUTO: 29.5 PG
MCHC RBC AUTO-ENTMCNC: 32.7 G/DL
MCV RBC AUTO: 90 FL
MONOCYTES ABSOLUTE: NORMAL
MONOCYTES RELATIVE PERCENT: NORMAL
NEUTROPHILS ABSOLUTE: NORMAL
NEUTROPHILS RELATIVE PERCENT: NORMAL
PLATELET # BLD: 369 K/ΜL
PMV BLD AUTO: 8.3 FL
POTASSIUM SERPL-SCNC: 3.7 MMOL/L
RBC # BLD: 4.53 10^6/ΜL
SODIUM BLD-SCNC: 138 MMOL/L
TOTAL PROTEIN: 6.7
TRIGL SERPL-MCNC: 83 MG/DL
TSH SERPL DL<=0.05 MIU/L-ACNC: 0.85 UIU/ML
VITAMIN B-12: 852
VLDLC SERPL CALC-MCNC: NORMAL MG/DL
WBC # BLD: 7.3 10^3/ML

## 2020-07-13 RX ORDER — ALPRAZOLAM 0.5 MG/1
0.5 TABLET ORAL DAILY PRN
Qty: 30 TABLET | Refills: 0 | Status: SHIPPED | OUTPATIENT
Start: 2020-07-13 | End: 2020-08-12 | Stop reason: SDUPTHER

## 2020-08-12 ENCOUNTER — OFFICE VISIT (OUTPATIENT)
Dept: FAMILY MEDICINE CLINIC | Age: 45
End: 2020-08-12
Payer: COMMERCIAL

## 2020-08-12 VITALS
HEIGHT: 65 IN | SYSTOLIC BLOOD PRESSURE: 132 MMHG | BODY MASS INDEX: 20.16 KG/M2 | DIASTOLIC BLOOD PRESSURE: 78 MMHG | TEMPERATURE: 97 F | HEART RATE: 86 BPM | OXYGEN SATURATION: 97 % | WEIGHT: 121 LBS | RESPIRATION RATE: 18 BRPM

## 2020-08-12 PROCEDURE — 99214 OFFICE O/P EST MOD 30 MIN: CPT | Performed by: NURSE PRACTITIONER

## 2020-08-12 RX ORDER — ALPRAZOLAM 0.5 MG/1
0.5 TABLET ORAL DAILY PRN
Qty: 30 TABLET | Refills: 0 | Status: CANCELLED | OUTPATIENT
Start: 2020-08-12 | End: 2021-08-12

## 2020-08-12 RX ORDER — ALPRAZOLAM 0.5 MG/1
0.5 TABLET ORAL DAILY PRN
Qty: 30 TABLET | Refills: 0 | Status: SHIPPED | OUTPATIENT
Start: 2020-08-12 | End: 2020-09-11 | Stop reason: SDUPTHER

## 2020-08-12 ASSESSMENT — ENCOUNTER SYMPTOMS
DIARRHEA: 0
EYE REDNESS: 0
RHINORRHEA: 0
SINUS PRESSURE: 0
COUGH: 0
WHEEZING: 0
NAUSEA: 0
BACK PAIN: 0
ABDOMINAL PAIN: 0
SHORTNESS OF BREATH: 0
EYE DISCHARGE: 0
VOMITING: 0
SORE THROAT: 0
BLOOD IN STOOL: 0
ABDOMINAL DISTENTION: 0
EYE PAIN: 0
CHEST TIGHTNESS: 0

## 2020-08-12 ASSESSMENT — PATIENT HEALTH QUESTIONNAIRE - PHQ9
SUM OF ALL RESPONSES TO PHQ9 QUESTIONS 1 & 2: 0
1. LITTLE INTEREST OR PLEASURE IN DOING THINGS: 0
SUM OF ALL RESPONSES TO PHQ QUESTIONS 1-9: 0
SUM OF ALL RESPONSES TO PHQ QUESTIONS 1-9: 0
2. FEELING DOWN, DEPRESSED OR HOPELESS: 0

## 2020-08-12 NOTE — PROGRESS NOTES
depression, 10-14 = Moderate depression, 15-19 = Moderately severe depression, 20-27 = Severe depression    Current Outpatient Medications   Medication Sig Dispense Refill    ALPRAZolam (XANAX) 0.5 MG tablet Take 1 tablet by mouth daily as needed for Sleep or Anxiety. 30 tablet 0    vitamin B-12 1000 MCG tablet Take 1 tablet by mouth daily 30 tablet 5    Cholecalciferol (VITAMIN D3) 2000 units CAPS Take 1 capsule by mouth daily 30 capsule 0    albuterol (PROVENTIL) (2.5 MG/3ML) 0.083% nebulizer solution Take 3 mLs by nebulization every 6 hours as needed for Wheezing or Shortness of Breath 120 each 1    albuterol (PROAIR HFA) 108 (90 BASE) MCG/ACT inhaler Inhale 2 puffs into the lungs every 6 hours as needed for Wheezing 1 Inhaler 3     No current facility-administered medications for this visit. Presents to office today for routine follow-up related to anxiety and intermittent insomnia. Reports Xanax controls her symptoms quite well. She takes it nightly. Reports it mellows her out and slows her brain down enough so she can go to sleep. She does not have any other concerns today. She has a good appetite. Normal bowel and bladder pattern. Review of Systems   Constitutional: Negative for activity change, appetite change, fatigue and fever. HENT: Negative for congestion, ear discharge, ear pain, postnasal drip, rhinorrhea, sinus pressure and sore throat. Eyes: Negative for pain, discharge and redness. Respiratory: Negative for cough, chest tightness, shortness of breath and wheezing. Cardiovascular: Negative for chest pain, palpitations and leg swelling. Gastrointestinal: Negative for abdominal distention, abdominal pain, blood in stool, diarrhea, nausea and vomiting. Endocrine: Negative for polydipsia, polyphagia and polyuria. Genitourinary: Negative for decreased urine volume, dysuria, flank pain and hematuria.    Musculoskeletal: Negative for arthralgias, back pain, joint swelling, myalgias and neck stiffness. Skin: Negative for rash. Neurological: Negative for dizziness, syncope, weakness, light-headedness and headaches. Hematological: Negative for adenopathy. Psychiatric/Behavioral: Positive for sleep disturbance. Negative for agitation, decreased concentration, self-injury and suicidal ideas. The patient is nervous/anxious. The patient is not hyperactive. Controlled with xanax nightly       Objective:   Physical Exam  Vitals signs and nursing note reviewed. Constitutional:       General: She is not in acute distress. Appearance: She is well-developed. HENT:      Head: Atraumatic. Right Ear: External ear normal.      Left Ear: External ear normal.      Nose: Nose normal.      Mouth/Throat:      Pharynx: Uvula midline. Eyes:      General: Lids are normal.         Right eye: No discharge. Left eye: No discharge. Conjunctiva/sclera: Conjunctivae normal.      Pupils: Pupils are equal, round, and reactive to light. Neck:      Musculoskeletal: Normal range of motion and neck supple. Trachea: Trachea normal.   Cardiovascular:      Rate and Rhythm: Normal rate and regular rhythm. Pulses:           Radial pulses are 2+ on the right side and 2+ on the left side. Posterior tibial pulses are 2+ on the right side and 2+ on the left side. Heart sounds: Normal heart sounds, S1 normal and S2 normal. No murmur. Pulmonary:      Effort: Pulmonary effort is normal. No respiratory distress. Breath sounds: Normal breath sounds. No wheezing or rhonchi. Abdominal:      General: Bowel sounds are normal. There is no distension. Palpations: Abdomen is soft. Tenderness: There is no abdominal tenderness. There is no rebound. Musculoskeletal: Normal range of motion. General: No tenderness. Lymphadenopathy:      Cervical: No cervical adenopathy. Skin:     General: Skin is warm and dry.       Capillary Refill: Capillary refill takes less than 2 seconds. Findings: No erythema or rash. Neurological:      Mental Status: She is alert and oriented to person, place, and time. GCS: GCS eye subscore is 4. GCS verbal subscore is 5. GCS motor subscore is 6. Motor: No abnormal muscle tone. Coordination: Coordination is intact. Coordination normal.      Gait: Gait is intact. Psychiatric:         Attention and Perception: Attention normal.         Mood and Affect: Mood normal.         Speech: Speech normal.         Behavior: Behavior normal. Behavior is cooperative. Thought Content: Thought content normal.         Cognition and Memory: Cognition normal.         Judgment: Judgment normal.         Assessment / Plan:     1. Anxiety  Stable  Continue Xanax as ordered  Encouraged healthy diet  Encouraged adequate fluid intake  Encourage consistent sleep-wake schedule  Monitor for any increase in symptoms  Call office with any questions or concerns  Follow up in 3 months. [id number] [address] Barker for VV. Return in about 3 months (around 11/12/2020) for medication follow up.           Electronically signed by RUSTY Greenwood NP on 8/12/2020 at 8:37 PM

## 2020-09-14 RX ORDER — ALPRAZOLAM 0.5 MG/1
0.5 TABLET ORAL DAILY PRN
Qty: 30 TABLET | Refills: 0 | Status: SHIPPED | OUTPATIENT
Start: 2020-09-14 | End: 2020-10-13 | Stop reason: SDUPTHER

## 2020-09-14 NOTE — TELEPHONE ENCOUNTER
Last OARRS Review-08/12/2020  Last Office Visit-08/12/2020  Return To Office-3 months   Next Appointment Date-11/12/2020  Last Refill Date-08/12/2020  30 tabs   Number of Refills Given- 0    Health Maintenance   Topic Date Due    Flu vaccine (1) 09/01/2020    Breast cancer screen  02/22/2021    Cervical cancer screen  12/29/2021    DTaP/Tdap/Td vaccine (2 - Td) 04/09/2024    Lipid screen  07/08/2025    Pneumococcal 0-64 years Vaccine  Completed    Hepatitis A vaccine  Aged Out    Hepatitis B vaccine  Aged Out    Hib vaccine  Aged Out    Meningococcal (ACWY) vaccine  Aged Out    HIV screen  Discontinued             (applicable per patient's age: Cancer Screenings, Depression Screening, Fall Risk Screening, Immunizations)    LDL Cholesterol (mg/dL)   Date Value   03/27/2019 112     LDL Calculated (mg/dL)   Date Value   07/08/2020 120     AST (U/L)   Date Value   07/08/2020 28     ALT (U/L)   Date Value   07/08/2020 24     BUN (mg/dL)   Date Value   07/08/2020 17      (goal A1C is < 7)   (goal LDL is <100) need 30-50% reduction from baseline     BP Readings from Last 3 Encounters:   08/12/20 132/78   05/13/20 107/72   01/22/20 118/70    (goal /80)      All Future Testing planned in [facility]:      Next Visit Date:  Future Appointments   Date Time Provider Yolanda Jordan   11/12/2020 12:00 PM RUSTY Morales - LUIS Akhtar [address] [facility]            Patient Active Problem List:     Facet arthritis of lumbar region     Spondylolisthesis at L4-L5 level     Acute right-sided low back pain with right-sided sciatica     Tobacco abuse     Cigarette smoker     Nephrolithiasis

## 2020-10-13 RX ORDER — ALPRAZOLAM 0.5 MG/1
0.5 TABLET ORAL DAILY PRN
Qty: 30 TABLET | Refills: 0 | Status: SHIPPED | OUTPATIENT
Start: 2020-10-13 | End: 2020-11-12 | Stop reason: SDUPTHER

## 2020-10-13 NOTE — TELEPHONE ENCOUNTER
Last OARRS Review-08/12/2020  Last Office Visit-08/12/2020  Return To Office-3 months   Next Appointment Date-11/12/2020  Last Refill Date-09/14/2020  30 tabs   Number of Refills Given-     Health Maintenance   Topic Date Due    Flu vaccine (1) 09/01/2020    Breast cancer screen  02/22/2021    Cervical cancer screen  12/29/2021    DTaP/Tdap/Td vaccine (2 - Td) 04/09/2024    Lipid screen  07/08/2025    Pneumococcal 0-64 years Vaccine  Completed    Hepatitis A vaccine  Aged Out    Hepatitis B vaccine  Aged Out    Hib vaccine  Aged Out    Meningococcal (ACWY) vaccine  Aged Out    HIV screen  Discontinued             (applicable per patient's age: Cancer Screenings, Depression Screening, Fall Risk Screening, Immunizations)    LDL Cholesterol (mg/dL)   Date Value   03/27/2019 112     LDL Calculated (mg/dL)   Date Value   07/08/2020 120     AST (U/L)   Date Value   07/08/2020 28     ALT (U/L)   Date Value   07/08/2020 24     BUN (mg/dL)   Date Value   07/08/2020 17      (goal A1C is < 7)   (goal LDL is <100) need 30-50% reduction from baseline     BP Readings from Last 3 Encounters:   08/12/20 132/78   05/13/20 107/72   01/22/20 118/70    (goal /80)      All Future Testing planned in [facility]:      Next Visit Date:  Future Appointments   Date Time Provider Yolanda Jordan   11/12/2020 12:00 PM Carmen Pineda, APRN - NP Sarah Sykes [address] [facility]            Patient Active Problem List:     Facet arthritis of lumbar region     Spondylolisthesis at L4-L5 level     Acute right-sided low back pain with right-sided sciatica     Tobacco abuse     Cigarette smoker     Nephrolithiasis

## 2020-11-12 ENCOUNTER — TELEMEDICINE (OUTPATIENT)
Dept: FAMILY MEDICINE CLINIC | Age: 45
End: 2020-11-12
Payer: COMMERCIAL

## 2020-11-12 PROCEDURE — 99214 OFFICE O/P EST MOD 30 MIN: CPT | Performed by: NURSE PRACTITIONER

## 2020-11-12 RX ORDER — ALPRAZOLAM 0.5 MG/1
0.5 TABLET ORAL DAILY PRN
Qty: 30 TABLET | Refills: 0 | Status: SHIPPED | OUTPATIENT
Start: 2020-11-12 | End: 2020-12-10 | Stop reason: SDUPTHER

## 2020-11-12 RX ORDER — ACETAMINOPHEN 160 MG
1 TABLET,DISINTEGRATING ORAL DAILY
Qty: 30 CAPSULE | Refills: 0 | Status: SHIPPED | OUTPATIENT
Start: 2020-11-12 | End: 2021-01-11 | Stop reason: SDUPTHER

## 2020-11-12 NOTE — PROGRESS NOTES
[address]  [phone]      2020      TELEHEALTH EVALUATION -- Audio/Visual (During MAPSW-14 public health emergency)    Visit Information    Have you changed or started any medications since your last visit including any over-the-counter medicines, vitamins, or herbal medicines? no   Are you having any side effects from any of your medications? -  no  Have you stopped taking any of your medications? Is so, why? -  no    Have you seen any other physician or provider since your last visit? No  Have you had any other diagnostic tests since your last visit? No  Have you been seen in the emergency room and/or had an admission to a hospital since we last saw you? No  Have you had your routine dental cleaning in the past 6 months? yes -     Have you activated your StatusPage account? If not, what are your barriers? Yes     Patient Care Team:  RUSTY Rosen NP as PCP - General (Nurse Practitioner)  RUSTY Rosen NP as PCP - [facility] Empaneled Provider      Benjie Sims (:  1975) has requested an audio/video evaluation for the following concern(s):    HPI:  Cc: medication refill    Med refill. Xanax. Takes nightly. Works well for her. Needs breast MRI-overdue- order placed . Will fax to [facility] per her request.  Blood in stool frequently. Increasing in frequency. Reports it is a moderate amount with some clotting. Bright red blood. Did occur 1.5-2 yrs ago every couple of months. Now daily. Urgent referral to GI. Reports has a good appetite. Drinking fluids. Normal bladder pattern. Sleeping okay.       Past Medical History:   Diagnosis Date    Anxiety     Depression     Lumbago      Past Surgical History:   Procedure Laterality Date    TUBAL LIGATION       Social History     Tobacco Use    Smoking status: Current Every Day Smoker     Packs/day: 0.50     Years: 22.00     Pack years: 11.00     Start date: 1/2/1995    Smokeless tobacco: Never Used    Tobacco comment: 8/2018 - 0.25 PPD   Substance Use Topics    Alcohol use: No     Alcohol/week: 0.0 standard drinks     Current Outpatient Medications   Medication Sig Dispense Refill    ALPRAZolam (XANAX) 0.5 MG tablet Take 1 tablet by mouth daily as needed for Sleep or Anxiety. 30 tablet 0    cyanocobalamin 1000 MCG tablet Take 1 tablet by mouth daily 30 tablet 5    Cholecalciferol (VITAMIN D3) 50 MCG (2000 UT) CAPS Take 1 capsule by mouth daily 30 capsule 0    albuterol (PROVENTIL) (2.5 MG/3ML) 0.083% nebulizer solution Take 3 mLs by nebulization every 6 hours as needed for Wheezing or Shortness of Breath 120 each 1    albuterol (PROAIR HFA) 108 (90 BASE) MCG/ACT inhaler Inhale 2 puffs into the lungs every 6 hours as needed for Wheezing 1 Inhaler 3     No current facility-administered medications for this visit. No Known Allergies    Subjective:  Review of Systems   Constitutional: Negative for activity change, appetite change, fatigue and fever. HENT: Negative for congestion, ear discharge, ear pain, postnasal drip, rhinorrhea, sinus pressure and sore throat. Eyes: Negative for pain, discharge and redness. Respiratory: Negative for cough, chest tightness, shortness of breath and wheezing. Cardiovascular: Negative for chest pain, palpitations and leg swelling. Gastrointestinal: Positive for blood in stool (frequent). Negative for abdominal distention, abdominal pain, diarrhea, nausea and vomiting. Endocrine: Negative for polydipsia, polyphagia and polyuria. Genitourinary: Negative for decreased urine volume, dysuria, flank pain and hematuria. Musculoskeletal: Negative for arthralgias, back pain, joint swelling, myalgias and neck stiffness. Skin: Negative for rash. Neurological: Negative for dizziness, syncope, weakness, light-headedness and headaches. Hematological: Negative for adenopathy. Psychiatric/Behavioral: Positive for sleep disturbance. Negative for agitation, decreased concentration, self-injury and suicidal ideas. The patient is nervous/anxious. The patient is not hyperactive. Controlled with xanax nightly       Objective:  Physical Exam  Vitals signs and nursing note reviewed. Constitutional:       General: She is not in acute distress. Appearance: She is not ill-appearing. HENT:      Head: Normocephalic and atraumatic. Eyes:      Conjunctiva/sclera: Conjunctivae normal.      Pupils: Pupils are equal, round, and reactive to light. Neck:      Trachea: Trachea normal.   Pulmonary:      Effort: Pulmonary effort is normal. No respiratory distress. Neurological:      Mental Status: She is alert and oriented to person, place, and time. Psychiatric:         Mood and Affect: Mood normal.         Speech: Speech normal.         Behavior: Behavior is cooperative. Due to this being a TeleHealth encounter, evaluation of the following organ systems is limited: Vitals/Constitutional/EENT/Resp/CV/GI//MS/Neuro/Skin/Heme-Lymph-Imm. Assessment:  1. Anxiety  Stable  - ALPRAZolam (XANAX) 0.5 MG tablet; Take 1 tablet by mouth daily as needed for Sleep or Anxiety. Dispense: 30 tablet; Refill: 0    2. Blood in stool  Worsening  Urgent referral Seth Mccollum MD, Gastroenterology, [place]    3. Encounter for breast cancer screening using non-mammogram modality  4. Dense breast  Routine  - MRI BREAST BILATERAL W CONTRAST; Future    Continue medication as ordered  Proceed with urgent referral to GI  Proceed with breast imaging  Encouraged healthy diet  Encouraged adequate fluid intake  Monitor for increase in blood in stool  Contact office with any questions or concerns      Plan:  Return in about 3 months (around 2/12/2021) for medication follow up.     An  electronic signature was used to authenticate this note.    Communication:  Items for Patient/Writer/Staff to Accomplish  Follow up to be scheduled as discussed. --RUSTY oRdriguez NP on 11/16/2020 at 12:30 AM19}    This is a telehealth visit that was performed with the originating site at Patient Location: [place]  and Provider Location of [place], [place]. Verbal consent to participate in video visit was obtained. Pursuant to the emergency declaration under the [address] [place] [address], [facility] waiver authority and the [employer] Act, this Virtual Visit was conducted, with patient's consent, to reduce the patient's risk of exposure to COVID-19 and provide continuity of care for an established/new patient. Services were provided through a video synchronous discussion virtually to substitute for in-person clinic visit. I discussed with the patient the nature of our telehealth visits via interactive/real-time audio/video that:  - I would evaluate the patient and recommend diagnostics and treatments based on my assessment  - Our sessions are not being recorded and that personal health information is protected  - Our team would provide follow up care in person if/when the patient needs it. Services were provided through a video synchronous discussion virtually to substitute for in-person clinic visit.

## 2020-11-16 ASSESSMENT — ENCOUNTER SYMPTOMS
COUGH: 0
BLOOD IN STOOL: 1
EYE PAIN: 0
VOMITING: 0
RHINORRHEA: 0
EYE DISCHARGE: 0
SINUS PRESSURE: 0
CHEST TIGHTNESS: 0
SORE THROAT: 0
WHEEZING: 0
BACK PAIN: 0
ABDOMINAL PAIN: 0
SHORTNESS OF BREATH: 0
DIARRHEA: 0
NAUSEA: 0
ABDOMINAL DISTENTION: 0
EYE REDNESS: 0

## 2020-11-20 ENCOUNTER — TELEPHONE (OUTPATIENT)
Dept: FAMILY MEDICINE CLINIC | Age: 45
End: 2020-11-20

## 2020-12-11 RX ORDER — ALPRAZOLAM 0.5 MG/1
0.5 TABLET ORAL DAILY PRN
Qty: 30 TABLET | Refills: 0 | Status: SHIPPED | OUTPATIENT
Start: 2020-12-11 | End: 2021-01-11 | Stop reason: SDUPTHER

## 2020-12-29 ENCOUNTER — TELEPHONE (OUTPATIENT)
Dept: GASTROENTEROLOGY | Age: 45
End: 2020-12-29

## 2021-01-11 DIAGNOSIS — F41.9 ANXIETY: ICD-10-CM

## 2021-01-11 RX ORDER — ALPRAZOLAM 0.5 MG/1
0.5 TABLET ORAL DAILY PRN
Qty: 30 TABLET | Refills: 0 | Status: SHIPPED | OUTPATIENT
Start: 2021-01-11 | End: 2021-02-10 | Stop reason: SDUPTHER

## 2021-01-11 NOTE — TELEPHONE ENCOUNTER
LV 11/12/20  LRF 12/11/20 30 Tablets 0 RF  RTO 3 months    Health Maintenance   Topic Date Due    Hepatitis C screen  1975    Breast cancer screen  02/22/2021    Cervical cancer screen  12/29/2021    DTaP/Tdap/Td vaccine (2 - Td) 04/09/2024    Lipid screen  07/08/2025    Flu vaccine  Completed    Pneumococcal 0-64 years Vaccine  Completed    Hepatitis A vaccine  Aged Out    Hepatitis B vaccine  Aged Out    Hib vaccine  Aged Out    Meningococcal (ACWY) vaccine  Aged Out    HIV screen  Discontinued             (applicable per patient's age: Cancer Screenings, Depression Screening, Fall Risk Screening, Immunizations)    LDL Cholesterol (mg/dL)   Date Value   03/27/2019 112     LDL Calculated (mg/dL)   Date Value   07/08/2020 120     AST (U/L)   Date Value   07/08/2020 28     ALT (U/L)   Date Value   07/08/2020 24     BUN (mg/dL)   Date Value   07/08/2020 17      (goal A1C is < 7)   (goal LDL is <100) need 30-50% reduction from baseline     BP Readings from Last 3 Encounters:   08/12/20 132/78   05/13/20 107/72   01/22/20 118/70    (goal /80)      All Future Testing planned in [facility]:  Lab Frequency Next Occurrence   MRI BREAST BILATERAL W CONTRAST Once 11/12/2020       Next Visit Date:  No future appointments.          Patient Active Problem List:     Facet arthritis of lumbar region     Spondylolisthesis at L4-L5 level     Acute right-sided low back pain with right-sided sciatica     Tobacco abuse     Cigarette smoker     Nephrolithiasis

## 2021-01-29 NOTE — TELEPHONE ENCOUNTER
3rd attempt-Writer provided woman who answered phone our contact information for patient to call the office.

## 2021-02-10 DIAGNOSIS — F41.9 ANXIETY: ICD-10-CM

## 2021-02-10 RX ORDER — ALPRAZOLAM 0.5 MG/1
0.5 TABLET ORAL DAILY PRN
Qty: 30 TABLET | Refills: 0 | Status: SHIPPED | OUTPATIENT
Start: 2021-02-10 | End: 2021-03-10 | Stop reason: SDUPTHER

## 2021-02-11 ENCOUNTER — OFFICE VISIT (OUTPATIENT)
Dept: GASTROENTEROLOGY | Age: 46
End: 2021-02-11
Payer: COMMERCIAL

## 2021-02-11 VITALS
DIASTOLIC BLOOD PRESSURE: 65 MMHG | WEIGHT: 120 LBS | RESPIRATION RATE: 16 BRPM | BODY MASS INDEX: 19.99 KG/M2 | HEIGHT: 65 IN | HEART RATE: 65 BPM | SYSTOLIC BLOOD PRESSURE: 116 MMHG

## 2021-02-11 DIAGNOSIS — K62.5 RECTAL BLEEDING: Primary | ICD-10-CM

## 2021-02-11 PROCEDURE — 99203 OFFICE O/P NEW LOW 30 MIN: CPT | Performed by: INTERNAL MEDICINE

## 2021-02-11 RX ORDER — HYDROCORTISONE 25 MG/G
CREAM TOPICAL
Qty: 1 TUBE | Refills: 1 | Status: SHIPPED | OUTPATIENT
Start: 2021-02-11 | End: 2021-07-16

## 2021-02-11 ASSESSMENT — ENCOUNTER SYMPTOMS
NAUSEA: 1
EYES NEGATIVE: 1
ALLERGIC/IMMUNOLOGIC NEGATIVE: 1
ANAL BLEEDING: 1
RECTAL PAIN: 0
DIARRHEA: 0
WHEEZING: 1
TROUBLE SWALLOWING: 0
CONSTIPATION: 0
ABDOMINAL DISTENTION: 0
BLOOD IN STOOL: 0
VOMITING: 0
ABDOMINAL PAIN: 0

## 2021-02-11 NOTE — PROGRESS NOTES
Reason for Referral: Blood in the stool       Rabiaog Zully, APRN - NP  [facility] [place],  [address] [facility]    Chief Complaint   Patient presents with   [facility] New Patient     Patient referred for blood in her stool. Patient notes this being intermittent over the last year. She notes blood is bright red and is alot. Can last usually a day. She notes seeing clots before, not the most recent time. Notes her BM are pretty regular and never a issue.  Other     Notes hemorrhoids when she had her daughter, no family history of GI issues. HISTORY OF PRESENT ILLNESS: Daymon Seip is a 51 y.o. female with a past history remarkable for anxiety, depression, referred for evaluation of blood in the stool. Patient reports the symptoms appear to be intermittent. Occasionally painful however for the most part painless. These bleeding episodes are small in quantity and mixed with bowel movements. Patient denies any no prior colonoscopy identified with 1 of these are internal or external hemorrhoids. Symptoms have been ongoing for approximately 1 to 2 months. No changes in weight. No abdominal pain or discomfort associated with this. Not associated with straining or constipation. Smoker: active smoker-- 4/day, 27 yrs   Drinking history: none   Abdominal surgeries: Tubal ligation, 21. Prior Colonoscopy: none   Prior EGD: None   FH of GI issues: None         Past Medical,Family, and Social History reviewed and does contribute to the patient presentingcondition. Patient's PMH/PSH,SH,PSYCH Hx, MEDs, ALLERGIES, and ROS were all reviewed and updated in the appropriate sections.     PAST MEDICAL HISTORY:  Past Medical History:   Diagnosis Date    Anxiety     Depression     Lumbago        Past Surgical History:   Procedure Laterality Date    TUBAL LIGATION  1996       CURRENT MEDICATIONS:    Current Outpatient Medications:     ALPRAZolam (XANAX) 0.5 MG tablet, Take 1 tablet by mouth daily as needed for Sleep or Anxiety. , Disp: 30 tablet, Rfl: 0    Cholecalciferol (VITAMIN D3) 50 MCG (2000 UT) CAPS, Take 1 capsule by mouth daily, Disp: 30 capsule, Rfl: 5    cyanocobalamin 1000 MCG tablet, Take 1 tablet by mouth daily, Disp: 30 tablet, Rfl: 5    albuterol (PROAIR HFA) 108 (90 BASE) MCG/ACT inhaler, Inhale 2 puffs into the lungs every 6 hours as needed for Wheezing (Patient not taking: Reported on 2/11/2021), Disp: 1 Inhaler, Rfl: 3    ALLERGIES:   No Known Allergies    FAMILY HISTORY:       Problem Relation Age of Onset    Breast Cancer Mother 50    Breast Cancer Sister 37    Breast Cancer Maternal Grandmother 52    No Known Problems Maternal Grandfather     Heart Disease Paternal Grandmother     Lung Cancer Paternal Grandfather          SOCIAL HISTORY:   Social History     Socioeconomic History    Marital status: Single     Spouse name: Not on file    Number of children: Not on file    Years of education: Not on file    Highest education level: Not on file   Occupational History    Not on file   Social Needs    Financial resource strain: Not hard at all   [email or web address]     Worry: Never true     Inability: Never true    Transportation needs     Medical: No     Non-medical: No   Tobacco Use    Smoking status: Current Every Day Smoker     Packs/day: 0.50     Years: 22.00     Pack years: 11.00     Start date: 1/2/1995    Smokeless tobacco: Never Used    Tobacco comment: 8/2018 - 0.25 PPD   Substance and Sexual Activity    Alcohol use: No     Alcohol/week: 0.0 standard drinks    Drug use: No    Sexual activity: Yes     Partners: Male     Birth control/protection: Surgical   Lifestyle    Physical activity     Days per week: Not on file     Minutes per session: Not on file    Stress: Not on file   Relationships    Social connections     Talks on phone: Not on file     Gets together: Not on file     Attends Orthodoxy service: Not on file     Active member of club or organization: Not on file     Attends meetings of clubs or organizations: Not on file     Relationship status: Not on file    Intimate partner violence     Fear of current or ex partner: Not on file     Emotionally abused: Not on file     Physically abused: Not on file     Forced sexual activity: Not on file   Other Topics Concern    Not on file   Social History Narrative    Not on file         REVIEW OF SYSTEMS: A 12-point review of systems was obtained and pertinent positives and negatives were listed below. REVIEW OF SYSTEMS:     Constitutional: No fever, no chills, no lethargy, no weakness. HEENT:  No headache, otalgia, itchy eyes, nasal discharge or sore throat. Cardiac:  No chest pain, dyspnea, orthopnea or PND. Chest:   No cough, phlegm or wheezing. Abdomen:      Detailed by MA   Neuro:  No focal weakness, abnormal movements or seizure like activity. Skin:   No rashes, no itching. :   No hematuria, no pyuria, no dysuria, no flank pain. Extremities:  No swelling or joint pains. ROS was otherwise negative    Review of Systems   Constitutional: Positive for fatigue (intermittent, hx of anemia). Negative for appetite change. HENT: Negative for trouble swallowing. Eyes: Negative. Respiratory: Positive for wheezing (smoking). Cardiovascular: Negative. Gastrointestinal: Positive for anal bleeding and nausea (very mild). Negative for abdominal distention, abdominal pain, blood in stool, constipation, diarrhea, rectal pain and vomiting. Endocrine: Negative. Genitourinary: Negative. Musculoskeletal: Negative. Skin: Negative. Allergic/Immunologic: Negative. Neurological: Positive for headaches. Hematological: Bruises/bleeds easily. Psychiatric/Behavioral: Positive for sleep disturbance. PHYSICAL EXAMINATION: Vital signs reviewed per the nursing documentation. There were no vitals taken for this visit. There is no height or weight on file to calculate BMI. Physical Exam    Physical Exam   Constitutional: Patient is oriented to person, place, and time. Patient appears well-developed and well-nourished. HENT:   Head: Normocephalic and atraumatic. Eyes: Pupils are equal, round, and reactive to light. EOM are normal.   Neck: Normal range of motion. Neck supple. No JVD present. No tracheal deviation present. No thyromegaly present. Cardiovascular: Normal rate, regular rhythm, normal heart sounds and intact distal pulses. Pulmonary/Chest: Effort normal and breath sounds normal. No stridor. No respiratory distress. He has no wheezes. He has no rales. He exhibits no tenderness. Abdominal: Soft. Bowel sounds are normal. He exhibits no distension and no mass. There is no tenderness. There is no rebound and no guarding. No hernia. Musculoskeletal: Normal range of motion. Lymphadenopathy:    Patient has no cervical adenopathy. Neurological: Patient is alert and oriented to person, place, and time. Psychiatric: Patient has a normal mood and affect. Patient behavior is normal.       LABORATORY DATA: Reviewed  Lab Results   Component Value Date    WBC 7.3 07/08/2020    HGB 13.4 07/08/2020    HCT 40.9 07/08/2020    MCV 90 07/08/2020     07/08/2020     07/08/2020    K 3.7 07/08/2020     07/08/2020    CO2 27 07/08/2020    BUN 17 07/08/2020    CREATININE 0.72 07/08/2020    LABALBU 4.1 07/08/2020    BILITOT 0.4 07/08/2020    ALKPHOS 50 07/08/2020    AST 28 07/08/2020    ALT 24 07/08/2020         Lab Results   Component Value Date    RBC 4.53 07/08/2020    HGB 13.4 07/08/2020    MCV 90 07/08/2020    MCH 29.5 07/08/2020    MCHC 32.7 07/08/2020    RDW 13.2 03/27/2019    MPV 8.3 07/08/2020         DIAGNOSTIC TESTING:     No results found. IMPRESSION: Chloé Li is a 51 y.o. female with a past history remarkable for anxiety, depression, referred for evaluation of blood in the stool. Patient reports the symptoms appear to be intermittent. Occasionally painful however for the most part painless. These bleeding episodes are small in quantity and mixed with bowel movements. Patient denies any no prior colonoscopy identified with 1 of these are internal or external hemorrhoids. Symptoms have been ongoing for approximately 1 to 2 months. No changes in weight. No abdominal pain or discomfort associated with this. Not associated with straining or constipation. PLAN:    1) painless rectal bleeding-will provide Anusol 2.5% as etiology may be related to hemorrhoids. Due to patient's risk factors will plan for diagnostic/screening colonoscopy to further evaluate and stratify sources of bleeding    2) advise smoking cessation    3) follow-up after procedure      Thank you for allowing me to participate in the care of Ms. Gayle. For any further questions please do not hesitate to contact me. I have reviewed and agree with the MA/LPN ROS please refer to their documentation from today's encounter on a separate note. Jya Pereira MD, MPH   [facility]  Office #: [phone]          this note is created with the assistance of a speech recognition program.  While intending to generate a document that actually reflects the content of the visit, the document can still have some errors including those of syntax and sound a like substitutions which may escape proof reading. It such instances, actual meaning can be extrapolated by contextual diversion.

## 2021-02-15 NOTE — TELEPHONE ENCOUNTER
Colonoscopy ordered but not scheduled at [address] 2/11/21, patient needs to check her scheduled. She will call office to scheduled.

## 2021-03-10 DIAGNOSIS — F41.9 ANXIETY: ICD-10-CM

## 2021-03-12 RX ORDER — ALPRAZOLAM 0.5 MG/1
0.5 TABLET ORAL DAILY PRN
Qty: 30 TABLET | Refills: 0 | Status: SHIPPED | OUTPATIENT
Start: 2021-03-12 | End: 2021-04-11 | Stop reason: SDUPTHER

## 2021-04-11 DIAGNOSIS — F41.9 ANXIETY: ICD-10-CM

## 2021-04-14 RX ORDER — ALPRAZOLAM 0.5 MG/1
0.5 TABLET ORAL DAILY PRN
Qty: 30 TABLET | Refills: 0 | Status: SHIPPED | OUTPATIENT
Start: 2021-04-14 | End: 2021-05-13 | Stop reason: SDUPTHER

## 2021-05-13 DIAGNOSIS — F41.9 ANXIETY: ICD-10-CM

## 2021-05-13 RX ORDER — ALPRAZOLAM 0.5 MG/1
0.5 TABLET ORAL DAILY PRN
Qty: 30 TABLET | Refills: 0 | Status: SHIPPED | OUTPATIENT
Start: 2021-05-13 | End: 2021-06-12 | Stop reason: SDUPTHER

## 2021-05-13 RX ORDER — ACETAMINOPHEN 160 MG
1 TABLET,DISINTEGRATING ORAL DAILY
Qty: 30 CAPSULE | Refills: 5 | Status: SHIPPED | OUTPATIENT
Start: 2021-05-13 | End: 2021-07-15 | Stop reason: SDUPTHER

## 2021-06-12 DIAGNOSIS — F41.9 ANXIETY: ICD-10-CM

## 2021-06-14 NOTE — TELEPHONE ENCOUNTER
Lov: 11/12/20  Lrf: 5/13/21  Na: 0 mychart message sent   Health Maintenance   Topic Date Due    Hepatitis C screen  Never done    COVID-19 Vaccine (1) Never done    Breast cancer screen  02/22/2021    Cervical cancer screen  12/29/2021    DTaP/Tdap/Td vaccine (2 - Td or Tdap) 04/09/2024    Lipid screen  07/08/2025    Pneumococcal 0-64 years Vaccine (2 of 2) 01/31/2039    Flu vaccine  Completed    Hepatitis A vaccine  Aged Out    Hepatitis B vaccine  Aged Out    Hib vaccine  Aged Out    Meningococcal (ACWY) vaccine  Aged Out    HIV screen  Discontinued             (applicable per patient's age: Cancer Screenings, Depression Screening, Fall Risk Screening, Immunizations)    LDL Cholesterol (mg/dL)   Date Value   03/27/2019 112     LDL Calculated (mg/dL)   Date Value   07/08/2020 120     AST (U/L)   Date Value   07/08/2020 28     ALT (U/L)   Date Value   07/08/2020 24     BUN (mg/dL)   Date Value   07/08/2020 17      (goal A1C is < 7)   (goal LDL is <100) need 30-50% reduction from baseline     BP Readings from Last 3 Encounters:   02/11/21 116/65   08/12/20 132/78   05/13/20 107/72    (goal /80)      All Future Testing planned in [facility]:  Lab Frequency Next Occurrence   MRI BREAST BILATERAL W CONTRAST Once 11/12/2020   COLONOSCOPY (Diagnostic) Once 02/25/2021       Next Visit Date:  No future appointments.          Patient Active Problem List:     Facet arthritis of lumbar region     Spondylolisthesis at L4-L5 level     Acute right-sided low back pain with right-sided sciatica     Tobacco abuse     Cigarette smoker     Nephrolithiasis

## 2021-06-15 RX ORDER — ALPRAZOLAM 0.5 MG/1
0.5 TABLET ORAL DAILY PRN
Qty: 30 TABLET | Refills: 0 | Status: SHIPPED | OUTPATIENT
Start: 2021-06-15 | End: 2021-07-15 | Stop reason: SDUPTHER

## 2021-07-15 DIAGNOSIS — F41.9 ANXIETY: ICD-10-CM

## 2021-07-15 NOTE — TELEPHONE ENCOUNTER
Last visit: 11/12/2020  Last Med refill: 06/15/2021  Does patient have enough medication for 72 hours: Yes    Next Visit Date:  Future Appointments   Date Time Provider Yolanda Jordan   7/16/2021  1:30 PM RUSTY Edmondson NP [address] Maintenance   Topic Date Due    Hepatitis C screen  Never done    COVID-19 Vaccine (1) Never done    Colon cancer screen colonoscopy  Never done    Breast cancer screen  02/25/2021    Flu vaccine (1) 09/01/2021    Cervical cancer screen  12/29/2021    DTaP/Tdap/Td vaccine (2 - Td or Tdap) 04/09/2024    Lipid screen  07/08/2025    Pneumococcal 0-64 years Vaccine (2 of 2 - PPSV23) 01/31/2039    Hepatitis A vaccine  Aged Out    Hepatitis B vaccine  Aged Out    Hib vaccine  Aged Out    Meningococcal (ACWY) vaccine  Aged Out    HIV screen  Discontinued       No results found for: LABA1C          ( goal A1C is < 7)   No results found for: LABMICR  LDL Cholesterol (mg/dL)   Date Value   03/27/2019 112   01/03/2017 119     LDL Calculated (mg/dL)   Date Value   07/08/2020 120   08/29/2014 129       (goal LDL is <100)   AST (U/L)   Date Value   07/08/2020 28     ALT (U/L)   Date Value   07/08/2020 24     BUN (mg/dL)   Date Value   07/08/2020 17     BP Readings from Last 3 Encounters:   02/11/21 116/65   08/12/20 132/78   05/13/20 107/72          (goal 120/80)    All Future Testing planned in [facility]  Lab Frequency Next Occurrence   MRI BREAST BILATERAL W CONTRAST Once 11/12/2020   COLONOSCOPY (Diagnostic) Once 02/25/2021               Patient Active Problem List:     Facet arthritis of lumbar region     Spondylolisthesis at L4-L5 level     Acute right-sided low back pain with right-sided sciatica     Tobacco abuse     Cigarette smoker     Nephrolithiasis

## 2021-07-16 ENCOUNTER — OFFICE VISIT (OUTPATIENT)
Dept: FAMILY MEDICINE CLINIC | Age: 46
End: 2021-07-16
Payer: COMMERCIAL

## 2021-07-16 VITALS
HEART RATE: 90 BPM | TEMPERATURE: 97.9 F | WEIGHT: 117 LBS | SYSTOLIC BLOOD PRESSURE: 116 MMHG | BODY MASS INDEX: 19.47 KG/M2 | DIASTOLIC BLOOD PRESSURE: 82 MMHG

## 2021-07-16 DIAGNOSIS — F41.9 ANXIETY: Primary | ICD-10-CM

## 2021-07-16 DIAGNOSIS — Z80.3 FAMILY HISTORY OF BREAST CANCER IN MOTHER: ICD-10-CM

## 2021-07-16 DIAGNOSIS — Z12.31 ENCOUNTER FOR SCREENING MAMMOGRAM FOR MALIGNANT NEOPLASM OF BREAST: ICD-10-CM

## 2021-07-16 PROCEDURE — 99213 OFFICE O/P EST LOW 20 MIN: CPT | Performed by: NURSE PRACTITIONER

## 2021-07-16 RX ORDER — ACETAMINOPHEN 160 MG
1 TABLET,DISINTEGRATING ORAL DAILY
Qty: 30 CAPSULE | Refills: 5 | Status: SHIPPED | OUTPATIENT
Start: 2021-07-16 | End: 2021-08-23 | Stop reason: SDUPTHER

## 2021-07-16 RX ORDER — ALPRAZOLAM 0.5 MG/1
0.5 TABLET ORAL DAILY PRN
Qty: 30 TABLET | Refills: 0 | Status: SHIPPED | OUTPATIENT
Start: 2021-07-16 | End: 2021-08-14 | Stop reason: SDUPTHER

## 2021-07-16 SDOH — ECONOMIC STABILITY: FOOD INSECURITY: WITHIN THE PAST 12 MONTHS, YOU WORRIED THAT YOUR FOOD WOULD RUN OUT BEFORE YOU GOT MONEY TO BUY MORE.: NEVER TRUE

## 2021-07-16 SDOH — ECONOMIC STABILITY: FOOD INSECURITY: WITHIN THE PAST 12 MONTHS, THE FOOD YOU BOUGHT JUST DIDN'T LAST AND YOU DIDN'T HAVE MONEY TO GET MORE.: NEVER TRUE

## 2021-07-16 ASSESSMENT — PATIENT HEALTH QUESTIONNAIRE - PHQ9
SUM OF ALL RESPONSES TO PHQ9 QUESTIONS 1 & 2: 0
SUM OF ALL RESPONSES TO PHQ QUESTIONS 1-9: 0
SUM OF ALL RESPONSES TO PHQ QUESTIONS 1-9: 0
2. FEELING DOWN, DEPRESSED OR HOPELESS: 0
SUM OF ALL RESPONSES TO PHQ QUESTIONS 1-9: 0
1. LITTLE INTEREST OR PLEASURE IN DOING THINGS: 0

## 2021-07-16 ASSESSMENT — SOCIAL DETERMINANTS OF HEALTH (SDOH): HOW HARD IS IT FOR YOU TO PAY FOR THE VERY BASICS LIKE FOOD, HOUSING, MEDICAL CARE, AND HEATING?: NOT VERY HARD

## 2021-07-16 NOTE — PROGRESS NOTES
Steffany Castellanos (:  1975) is a 51 y.o. female,Established patient, here for evaluation of the following chief complaint(s): Anxiety         ASSESSMENT/PLAN:  1. Anxiety  2. Encounter for screening mammogram for malignant neoplasm of breast  -     [facility]OGDAN DIGITAL SCREEN SELF REFERRAL W OR WO CAD BILATERAL; Future  3. Family history of breast cancer in mother  -     DANA BOGDAN DIGITAL SCREEN SELF REFERRAL W OR WO CAD BILATERAL; Future      Return in about 3 months (around 10/16/2021), or if symptoms worsen or fail to improve, for medication follow up. Subjective   SUBJECTIVE/OBJECTIVE:  Presents to office today for routine medication review. Taking Xanax at night if needed. Feels she takes about 4x per week. Depends on ability to shut down and sleep. Does work well for her. Makes her ability to function the next day improved. GI in feb for bleeding hemorrhoids. Given cream and recommended colonoscopy-has not yet scheduled. Encouraged to call and schedule as soon as able. Review of Systems   Constitutional: Negative for activity change, appetite change, fatigue and fever. HENT: Negative for congestion, rhinorrhea and sore throat. Eyes: Negative for visual disturbance. Respiratory: Negative for cough and shortness of breath. Cardiovascular: Negative for chest pain and palpitations. Gastrointestinal: Positive for rectal pain (hemorrhoids). Negative for constipation, diarrhea, nausea and vomiting. Genitourinary: Negative for dysuria and urgency. Allergic/Immunologic: Negative for immunocompromised state. Neurological: Negative for syncope, light-headedness and headaches. Psychiatric/Behavioral: Positive for sleep disturbance. Negative for decreased concentration, dysphoric mood and suicidal ideas. The patient is nervous/anxious. Well controlled with current medication          Objective   Physical Exam  Vitals and nursing note reviewed.    Constitutional: General: She is not in acute distress. Appearance: She is not ill-appearing. HENT:      Head: Normocephalic. Nose: Nose normal.      Mouth/Throat:      Lips: Pink. Pulmonary:      Effort: Pulmonary effort is normal. No respiratory distress. Neurological:      Mental Status: She is alert and oriented to person, place, and time. Psychiatric:         Attention and Perception: Attention normal.         Speech: Speech normal.         Behavior: Behavior is cooperative. An electronic signature was used to authenticate this note.     --RUSTY Nunes - NP

## 2021-07-26 ASSESSMENT — ENCOUNTER SYMPTOMS
VOMITING: 0
SHORTNESS OF BREATH: 0
SORE THROAT: 0
RHINORRHEA: 0
NAUSEA: 0
DIARRHEA: 0
COUGH: 0
RECTAL PAIN: 1
CONSTIPATION: 0

## 2021-08-14 DIAGNOSIS — F41.9 ANXIETY: ICD-10-CM

## 2021-08-16 NOTE — TELEPHONE ENCOUNTER
Last visit: 07/16/2021  Last Med refill: 07/16/2021  Does patient have enough medication for 72 hours: Yes    Next Visit Date:  Future Appointments   Date Time Provider Yolanda Jordan   10/22/2021  1:30 PM RUSTY Bowling NP [address] Maintenance   Topic Date Due    Hepatitis C screen  Never done    COVID-19 Vaccine (1) Never done    Colon cancer screen colonoscopy  Never done    Breast cancer screen  02/25/2021    Flu vaccine (1) 09/01/2021    Cervical cancer screen  12/29/2021    DTaP/Tdap/Td vaccine (2 - Td or Tdap) 04/09/2024    Lipid screen  07/08/2025    Pneumococcal 0-64 years Vaccine (2 of 2 - PPSV23) 01/31/2039    Hepatitis A vaccine  Aged Out    Hepatitis B vaccine  Aged Out    Hib vaccine  Aged Out    Meningococcal (ACWY) vaccine  Aged Out    HIV screen  Discontinued       No results found for: LABA1C          ( goal A1C is < 7)   No results found for: LABMICR  LDL Cholesterol (mg/dL)   Date Value   03/27/2019 112   01/03/2017 119     LDL Calculated (mg/dL)   Date Value   07/08/2020 120   08/29/2014 129       (goal LDL is <100)   AST (U/L)   Date Value   07/08/2020 28     ALT (U/L)   Date Value   07/08/2020 24     BUN (mg/dL)   Date Value   07/08/2020 17     BP Readings from Last 3 Encounters:   07/16/21 116/82   02/11/21 116/65   08/12/20 132/78          (goal 120/80)    All Future Testing planned in [facility]  Lab Frequency Next Occurrence   MRI BREAST BILATERAL W CONTRAST Once 11/12/2020   DANA BOGDAN DIGITAL SCREEN SELF REFERRAL W OR WO CAD BILATERAL Once 09/01/2022               Patient Active Problem List:     Facet arthritis of lumbar region     Spondylolisthesis at L4-L5 level     Acute right-sided low back pain with right-sided sciatica     Tobacco abuse     Cigarette smoker     Nephrolithiasis

## 2021-08-18 RX ORDER — ALPRAZOLAM 0.5 MG/1
0.5 TABLET ORAL DAILY PRN
Qty: 30 TABLET | Refills: 0 | Status: SHIPPED | OUTPATIENT
Start: 2021-08-18 | End: 2021-09-17 | Stop reason: SDUPTHER

## 2021-08-23 RX ORDER — ACETAMINOPHEN 160 MG
1 TABLET,DISINTEGRATING ORAL DAILY
Qty: 30 CAPSULE | Refills: 5 | Status: SHIPPED | OUTPATIENT
Start: 2021-08-23 | End: 2021-09-28 | Stop reason: SDUPTHER

## 2021-08-23 NOTE — TELEPHONE ENCOUNTER
Last visit: 07/16/2021  Last Med refill: 07/16/2021  Does patient have enough medication for 72 hours: Yes    Next Visit Date:  Future Appointments   Date Time Provider Yolanda Jordan   10/22/2021  1:30 PM RUSTY [address] NPl[address] Credenilson [address] Maintenance   Topic Date Due    Hepatitis C screen  Never done    COVID-19 Vaccine (1) Never done    Colon cancer screen colonoscopy  Never done    Breast cancer screen  02/25/2021    Flu vaccine (1) 09/01/2021    Cervical cancer screen  12/29/2021    DTaP/Tdap/Td vaccine (2 - Td or Tdap) 04/09/2024    Lipid screen  07/08/2025    Pneumococcal 0-64 years Vaccine (2 of 2 - PPSV23) 01/31/2039    Hepatitis A vaccine  Aged Out    Hepatitis B vaccine  Aged Out    Hib vaccine  Aged Out    Meningococcal (ACWY) vaccine  Aged Out    HIV screen  Discontinued       No results found for: LABA1C          ( goal A1C is < 7)   No results found for: LABMICR  LDL Cholesterol (mg/dL)   Date Value   03/27/2019 112   01/03/2017 119     LDL Calculated (mg/dL)   Date Value   07/08/2020 120   08/29/2014 129       (goal LDL is <100)   AST (U/L)   Date Value   07/08/2020 28     ALT (U/L)   Date Value   07/08/2020 24     BUN (mg/dL)   Date Value   07/08/2020 17     BP Readings from Last 3 Encounters:   07/16/21 116/82   02/11/21 116/65   08/12/20 132/78          (goal 120/80)    All Future Testing planned in [facility]  Lab Frequency Next Occurrence   MRI BREAST BILATERAL W CONTRAST Once 11/12/2020   DANA BOGDAN DIGITAL SCREEN SELF REFERRAL W OR WO CAD BILATERAL Once 09/01/2022               Patient Active Problem List:     Facet arthritis of lumbar region     Spondylolisthesis at L4-L5 level     Acute right-sided low back pain with right-sided sciatica     Tobacco abuse     Cigarette smoker     Nephrolithiasis

## 2021-09-17 DIAGNOSIS — F41.9 ANXIETY: ICD-10-CM

## 2021-09-17 NOTE — TELEPHONE ENCOUNTER
Last visit: 07/16/2021  Last Med refill: 08/18/2021  Does patient have enough medication for 72 hours: Yes    Next Visit Date:  Future Appointments   Date Time Provider Yolanda Jordan   10/22/2021  1:30 PM RUSTY Ambrocio NP [facility] [address] Maintenance   Topic Date Due    Hepatitis C screen  Never done    COVID-19 Vaccine (1) Never done    Colon cancer screen colonoscopy  Never done    Breast cancer screen  02/25/2021    Flu vaccine (1) 09/01/2021    Cervical cancer screen  12/29/2021    DTaP/Tdap/Td vaccine (2 - Td or Tdap) 04/09/2024    Lipid screen  07/08/2025    Pneumococcal 0-64 years Vaccine (2 of 2 - PPSV23) 01/31/2039    Hepatitis A vaccine  Aged Out    Hepatitis B vaccine  Aged Out    Hib vaccine  Aged Out    Meningococcal (ACWY) vaccine  Aged Out    HIV screen  Discontinued       No results found for: LABA1C          ( goal A1C is < 7)   No results found for: LABMICR  LDL Cholesterol (mg/dL)   Date Value   03/27/2019 112   01/03/2017 119     LDL Calculated (mg/dL)   Date Value   07/08/2020 120   08/29/2014 129       (goal LDL is <100)   AST (U/L)   Date Value   07/08/2020 28     ALT (U/L)   Date Value   07/08/2020 24     BUN (mg/dL)   Date Value   07/08/2020 17     BP Readings from Last 3 Encounters:   07/16/21 116/82   02/11/21 116/65   08/12/20 132/78          (goal 120/80)    All Future Testing planned in [facility]  Lab Frequency Next Occurrence   MRI BREAST BILATERAL W CONTRAST Once 11/12/2020   DANA BOGDAN DIGITAL SCREEN SELF REFERRAL W OR WO CAD BILATERAL Once 09/01/2022               Patient Active Problem List:     Facet arthritis of lumbar region     Spondylolisthesis at L4-L5 level     Acute right-sided low back pain with right-sided sciatica     Tobacco abuse     Cigarette smoker     Nephrolithiasis

## 2021-09-19 RX ORDER — ALPRAZOLAM 0.5 MG/1
0.5 TABLET ORAL DAILY PRN
Qty: 30 TABLET | Refills: 0 | Status: SHIPPED | OUTPATIENT
Start: 2021-09-19 | End: 2021-10-21 | Stop reason: SDUPTHER

## 2021-09-28 NOTE — TELEPHONE ENCOUNTER
Last visit: 7-16-21  Last Med refill: 8-23-21  Does patient have enough medication for 72 hours: No:     Next Visit Date:  Future Appointments   Date Time Provider Yolanda Jordan   10/22/2021  1:30 PM RUSTY Thomas NP [address] Maintenance   Topic Date Due    Hepatitis C screen  Never done    COVID-19 Vaccine (1) Never done    Colon cancer screen colonoscopy  Never done    Breast cancer screen  02/25/2021    Flu vaccine (1) 09/01/2021    Cervical cancer screen  12/29/2021    DTaP/Tdap/Td vaccine (2 - Td or Tdap) 04/09/2024    Lipid screen  07/08/2025    Pneumococcal 0-64 years Vaccine (2 of 2 - PPSV23) 01/31/2039    Hepatitis A vaccine  Aged Out    Hepatitis B vaccine  Aged Out    Hib vaccine  Aged Out    Meningococcal (ACWY) vaccine  Aged Out    HIV screen  Discontinued       No results found for: LABA1C          ( goal A1C is < 7)   No results found for: LABMICR  LDL Cholesterol (mg/dL)   Date Value   03/27/2019 112   01/03/2017 119     LDL Calculated (mg/dL)   Date Value   07/08/2020 120   08/29/2014 129       (goal LDL is <100)   AST (U/L)   Date Value   07/08/2020 28     ALT (U/L)   Date Value   07/08/2020 24     BUN (mg/dL)   Date Value   07/08/2020 17     BP Readings from Last 3 Encounters:   07/16/21 116/82   02/11/21 116/65   08/12/20 132/78          (goal 120/80)    All Future Testing planned in [facility]  Lab Frequency Next Occurrence   MRI BREAST BILATERAL W CONTRAST Once 11/12/2020   DANA BOGDAN DIGITAL SCREEN SELF REFERRAL W OR WO CAD BILATERAL Once 09/01/2022               Patient Active Problem List:     Facet arthritis of lumbar region     Spondylolisthesis at L4-L5 level     Acute right-sided low back pain with right-sided sciatica     Tobacco abuse     Cigarette smoker     Nephrolithiasis

## 2021-09-29 RX ORDER — ACETAMINOPHEN 160 MG
1 TABLET,DISINTEGRATING ORAL DAILY
Qty: 30 CAPSULE | Refills: 5 | Status: SHIPPED | OUTPATIENT
Start: 2021-09-29 | End: 2021-11-13 | Stop reason: SDUPTHER

## 2021-10-18 DIAGNOSIS — Z80.3 FAMILY HISTORY OF BREAST CANCER IN MOTHER: ICD-10-CM

## 2021-10-18 DIAGNOSIS — Z12.31 ENCOUNTER FOR SCREENING MAMMOGRAM FOR MALIGNANT NEOPLASM OF BREAST: ICD-10-CM

## 2021-10-19 DIAGNOSIS — Z80.3 FAMILY HISTORY OF BREAST CANCER IN MOTHER: Primary | ICD-10-CM

## 2021-10-21 DIAGNOSIS — F41.9 ANXIETY: ICD-10-CM

## 2021-10-21 RX ORDER — ALPRAZOLAM 0.5 MG/1
0.5 TABLET ORAL DAILY PRN
Qty: 30 TABLET | Refills: 0 | Status: SHIPPED | OUTPATIENT
Start: 2021-10-21 | End: 2021-11-18 | Stop reason: SDUPTHER

## 2021-10-21 NOTE — TELEPHONE ENCOUNTER
Last visit: 7/16/21  Last Med refill: 9/19/21    Next Visit Date:  Future Appointments   Date Time Provider Yolanda Jordan   10/22/2021  1:45 PM Lisa Tobar, APRN - [facility] [address] Maintenance   Topic Date Due    Hepatitis C screen  Never done    COVID-19 Vaccine (1) Never done    Colon cancer screen colonoscopy  Never done    Flu vaccine (1) 09/01/2021    Cervical cancer screen  12/29/2021    Breast cancer screen  10/09/2022    DTaP/Tdap/Td vaccine (2 - Td or Tdap) 04/09/2024    Lipid screen  07/08/2025    Pneumococcal 0-64 years Vaccine (2 of 2 - PPSV23) 01/31/2039    Hepatitis A vaccine  Aged Out    Hepatitis B vaccine  Aged Out    Hib vaccine  Aged Out    Meningococcal (ACWY) vaccine  Aged Out    HIV screen  Discontinued       No results found for: LABA1C          ( goal A1C is < 7)   No results found for: LABMICR  LDL Cholesterol (mg/dL)   Date Value   03/27/2019 112   01/03/2017 119     LDL Calculated (mg/dL)   Date Value   07/08/2020 120   08/29/2014 129       (goal LDL is <100)   AST (U/L)   Date Value   07/08/2020 28     ALT (U/L)   Date Value   07/08/2020 24     BUN (mg/dL)   Date Value   07/08/2020 17     BP Readings from Last 3 Encounters:   07/16/21 116/82   02/11/21 116/65   08/12/20 132/78          (goal 120/80)    All Future Testing planned in [facility]  Lab Frequency Next Occurrence   MRI BREAST BILATERAL W CONTRAST Once 11/12/2020               Patient Active Problem List:     Facet arthritis of lumbar region     Spondylolisthesis at L4-L5 level     Acute right-sided low back pain with right-sided sciatica     Tobacco abuse     Cigarette smoker     Nephrolithiasis

## 2021-10-29 ENCOUNTER — TELEMEDICINE (OUTPATIENT)
Dept: FAMILY MEDICINE CLINIC | Age: 46
End: 2021-10-29
Payer: COMMERCIAL

## 2021-10-29 DIAGNOSIS — F41.9 ANXIETY: Primary | ICD-10-CM

## 2021-10-29 PROCEDURE — 99213 OFFICE O/P EST LOW 20 MIN: CPT | Performed by: NURSE PRACTITIONER

## 2021-10-29 NOTE — PROGRESS NOTES
Fly Monte (:  1975) is a 51 y.o. female,Established patient, here for evaluation of the following chief complaint(s): Medication Check         ASSESSMENT/PLAN:  1. Anxiety      Return in about 3 months (around 2022), or if symptoms worsen or fail to improve, for medication follow up. SUBJECTIVE/OBJECTIVE:  Med check routine    Vertigo came back. Subsiding now but happened 2x in the last month. Lasted for about one week. Review of Systems    Patient-Reported Vitals 2020   Patient-Reported Weight 120 lbs   Patient-Reported Height 5ft 5.75in        Physical Exam              Rula ASIF Gayle, was evaluated through a synchronous (real-time) audio-video encounter. The patient (or guardian if applicable) is aware that this is a billable service. Verbal consent to proceed has been obtained within the past 12 months. The visit was conducted pursuant to the emergency declaration under the [facility], [address] [place] [address] authority and the [employer] and [employer] General Act. Patient identification was verified, and a caregiver was present when appropriate. The patient was located in a state where the provider was credentialed to provide care. An electronic signature was used to authenticate this note.     --RUSTY Mulligan NP

## 2021-11-11 DIAGNOSIS — F41.9 ANXIETY: ICD-10-CM

## 2021-11-11 DIAGNOSIS — E55.9 VITAMIN D DEFICIENCY: ICD-10-CM

## 2021-11-11 DIAGNOSIS — E53.9 VITAMIN B DEFICIENCY: ICD-10-CM

## 2021-11-11 DIAGNOSIS — Z13.220 LIPID SCREENING: ICD-10-CM

## 2021-11-11 LAB
BASOPHILS ABSOLUTE: NORMAL
BASOPHILS RELATIVE PERCENT: NORMAL
BUN BLDV-MCNC: 15 MG/DL
CALCIUM SERPL-MCNC: 8.7 MG/DL
CHLORIDE BLD-SCNC: 103 MMOL/L
CHOLESTEROL, FASTING: 186
CO2: 29 MMOL/L
CREAT SERPL-MCNC: 0.75 MG/DL
EOSINOPHILS ABSOLUTE: NORMAL
EOSINOPHILS RELATIVE PERCENT: NORMAL
FOLATE: 13.3
GFR CALCULATED: NORMAL
GLUCOSE BLD-MCNC: 104 MG/DL
HCT VFR BLD CALC: 42.6 % (ref 36–46)
HDLC SERPL-MCNC: 45 MG/DL (ref 35–70)
HEMOGLOBIN: 14.2 G/DL (ref 12–16)
LDL CHOLESTEROL CALCULATED: 124 MG/DL (ref 0–160)
LYMPHOCYTES ABSOLUTE: NORMAL
LYMPHOCYTES RELATIVE PERCENT: NORMAL
MCH RBC QN AUTO: 30.3 PG
MCHC RBC AUTO-ENTMCNC: 33.4 G/DL
MCV RBC AUTO: 91 FL
MONOCYTES ABSOLUTE: NORMAL
MONOCYTES RELATIVE PERCENT: NORMAL
NEUTROPHILS ABSOLUTE: NORMAL
NEUTROPHILS RELATIVE PERCENT: NORMAL
PLATELET # BLD: 242 K/ΜL
PMV BLD AUTO: 8.4 FL
POTASSIUM SERPL-SCNC: 4.1 MMOL/L
RBC # BLD: 4.69 10^6/ΜL
SODIUM BLD-SCNC: 139 MMOL/L
TRIGLYCERIDE, FASTING: 84
VITAMIN B-12: 1266
VITAMIN D 25-HYDROXY: 45.8
VITAMIN D2, 25 HYDROXY: NORMAL
VITAMIN D3,25 HYDROXY: NORMAL
WBC # BLD: 4 10^3/ML

## 2021-11-15 NOTE — TELEPHONE ENCOUNTER
Last visit: 10/29/21  Last Med refill:9/29/21  Does patient have enough medication for 72 hours: Yes    Next Visit Date:  Future Appointments   Date Time Provider Yolanda Jordan   1/7/2022 12:00 PM [address]   1/7/2022 12:45 PM Herbert Story MD [facility] Maintenance   Topic Date Due    Hepatitis C screen  Never done    COVID-19 Vaccine (1) Never done    Colon cancer screen colonoscopy  Never done    Flu vaccine (1) 09/01/2021    Cervical cancer screen  12/29/2021    Breast cancer screen  10/09/2022    DTaP/Tdap/Td vaccine (2 - Td or Tdap) 04/09/2024    Lipid screen  11/11/2026    Pneumococcal 0-64 years Vaccine (2 of 2 - PPSV23) 01/31/2039    Hepatitis A vaccine  Aged Out    Hepatitis B vaccine  Aged Out    Hib vaccine  Aged Out    Meningococcal (ACWY) vaccine  Aged Out    HIV screen  Discontinued       No results found for: LABA1C          ( goal A1C is < 7)   No results found for: LABMICR  LDL Cholesterol (mg/dL)   Date Value   03/27/2019 112   01/03/2017 119     LDL Calculated (mg/dL)   Date Value   11/11/2021 124   07/08/2020 120       (goal LDL is <100)   AST (U/L)   Date Value   07/08/2020 28     ALT (U/L)   Date Value   07/08/2020 24     BUN (mg/dL)   Date Value   11/11/2021 15     BP Readings from Last 3 Encounters:   07/16/21 116/82   02/11/21 116/65   08/12/20 132/78          (goal 120/80)    All Future Testing planned in [facility]  Lab Frequency Next Occurrence               Patient Active Problem List:     Facet arthritis of lumbar region     Spondylolisthesis at L4-L5 level     Acute right-sided low back pain with right-sided sciatica     Tobacco abuse     Cigarette smoker     Nephrolithiasis

## 2021-11-16 RX ORDER — ACETAMINOPHEN 160 MG
1 TABLET,DISINTEGRATING ORAL DAILY
Qty: 30 CAPSULE | Refills: 5 | Status: SHIPPED | OUTPATIENT
Start: 2021-11-16 | End: 2022-01-20 | Stop reason: SDUPTHER

## 2021-11-18 DIAGNOSIS — F41.9 ANXIETY: ICD-10-CM

## 2021-11-19 RX ORDER — ALPRAZOLAM 0.5 MG/1
0.5 TABLET ORAL DAILY PRN
Qty: 30 TABLET | Refills: 0 | Status: SHIPPED | OUTPATIENT
Start: 2021-11-19 | End: 2021-12-17 | Stop reason: SDUPTHER

## 2021-12-17 DIAGNOSIS — F41.9 ANXIETY: ICD-10-CM

## 2021-12-17 NOTE — TELEPHONE ENCOUNTER
LOV 10-29-22  LRF 11-19-21    Health Maintenance   Topic Date Due    Hepatitis C screen  Never done    COVID-19 Vaccine (1) Never done    Colon cancer screen colonoscopy  Never done    Flu vaccine (1) 09/01/2021    Cervical cancer screen  12/29/2021    Breast cancer screen  10/09/2022    DTaP/Tdap/Td vaccine (2 - Td or Tdap) 04/09/2024    Lipid screen  11/11/2026    Pneumococcal 0-64 years Vaccine (2 of 2 - PPSV23) 01/31/2039    Hepatitis A vaccine  Aged Out    Hepatitis B vaccine  Aged Out    Hib vaccine  Aged Out    Meningococcal (ACWY) vaccine  Aged Out    HIV screen  Discontinued             (applicable per patient's age: Cancer Screenings, Depression Screening, Fall Risk Screening, Immunizations)    LDL Cholesterol (mg/dL)   Date Value   03/27/2019 112     LDL Calculated (mg/dL)   Date Value   11/11/2021 124     AST (U/L)   Date Value   07/08/2020 28     ALT (U/L)   Date Value   07/08/2020 24     BUN (mg/dL)   Date Value   11/11/2021 15      (goal A1C is < 7)   (goal LDL is <100) need 30-50% reduction from baseline     BP Readings from Last 3 Encounters:   07/16/21 116/82   02/11/21 116/65   08/12/20 132/78    (goal /80)      All Future Testing planned in [facility]:  Lab Frequency Next Occurrence       Next Visit Date:  Future Appointments   Date Time Provider Yolanda Jordan   1/7/2022 12:00 PM [address]   1/7/2022 12:45 PM Mariana Mcneill MD [address] [place] [address]            Patient Active Problem List:     Facet arthritis of lumbar region     Spondylolisthesis at L4-L5 level     Acute right-sided low back pain with right-sided sciatica     Tobacco abuse     Cigarette smoker     Nephrolithiasis

## 2021-12-21 RX ORDER — ALPRAZOLAM 0.5 MG/1
0.5 TABLET ORAL DAILY PRN
Qty: 30 TABLET | Refills: 0 | Status: SHIPPED | OUTPATIENT
Start: 2021-12-21 | End: 2022-01-20 | Stop reason: SDUPTHER

## 2022-01-07 ENCOUNTER — INITIAL CONSULT (OUTPATIENT)
Dept: ONCOLOGY | Age: 47
End: 2022-01-07
Payer: COMMERCIAL

## 2022-01-07 ENCOUNTER — TELEPHONE (OUTPATIENT)
Dept: ONCOLOGY | Age: 47
End: 2022-01-07

## 2022-01-07 VITALS
BODY MASS INDEX: 18.8 KG/M2 | SYSTOLIC BLOOD PRESSURE: 117 MMHG | HEART RATE: 80 BPM | HEIGHT: 66 IN | DIASTOLIC BLOOD PRESSURE: 77 MMHG | TEMPERATURE: 96.7 F | WEIGHT: 117 LBS

## 2022-01-07 DIAGNOSIS — Z80.3 FAMILY HISTORY OF BREAST CANCER: ICD-10-CM

## 2022-01-07 DIAGNOSIS — Z91.89 AT HIGH RISK FOR BREAST CANCER: ICD-10-CM

## 2022-01-07 DIAGNOSIS — Z72.0 TOBACCO ABUSE: ICD-10-CM

## 2022-01-07 DIAGNOSIS — Z80.3 FAMILY HISTORY OF BREAST CANCER: Primary | ICD-10-CM

## 2022-01-07 DIAGNOSIS — Z91.89 AT HIGH RISK FOR BREAST CANCER: Primary | ICD-10-CM

## 2022-01-07 PROCEDURE — G0463 HOSPITAL OUTPT CLINIC VISIT: HCPCS | Performed by: INTERNAL MEDICINE

## 2022-01-07 PROCEDURE — 99204 OFFICE O/P NEW MOD 45 MIN: CPT | Performed by: INTERNAL MEDICINE

## 2022-01-07 PROCEDURE — 99202 OFFICE O/P NEW SF 15 MIN: CPT | Performed by: INTERNAL MEDICINE

## 2022-01-07 PROCEDURE — 96040 PR GENETIC COUNSELING, EACH 30 MIN: CPT | Performed by: GENETIC COUNSELOR, MS

## 2022-01-07 RX ORDER — RALOXIFENE HYDROCHLORIDE 60 MG/1
60 TABLET, FILM COATED ORAL DAILY
Qty: 30 TABLET | Refills: 3 | Status: SHIPPED | OUTPATIENT
Start: 2022-01-07

## 2022-01-07 NOTE — PROGRESS NOTES
BRIEF CASE HISTORY: Patient is a very pleasant 51 y.o. female who is referred to us for consultation for the high risk  breast cancer clinic. She is evaluated by myself and the genetic counselor. She does not have any personal history of breast cancer but she has significant family history. She underwent screening mammogram and during the screen, she took the [employer] high risk questionnaire. The questionnaire showed that her lifetime risk of breast cancer is 32.9%. which falls into the high risk category. She is referred to us for counseling, discussion of risk reduction modalities as well as genetic evaluation. GYN history     Menarche age 5 first live birth age 20 patient is premenopausal. Patient does not have any history of prior breast biopsy, hormone replacement therapy use or history of hysterectomy or ovarian surgery. Last mammogram was in October 2021. Last MRI breast was in December 2020. PAST MEDICAL HISTORY:  has a past medical history of Anxiety, Depression, and Lumbago. PAST SURGICAL HISTORY:   Past Surgical History:   Procedure Laterality Date    TUBAL LIGATION  1996      CURRENT MEDICATIONS:  has a current medication list which includes the following prescription(s): raloxifene, alprazolam, cyanocobalamin, and vitamin d3. ALLERGIES:  has No Known Allergies. FAMILY HISTORY: detailed family history was obtained, and reviewed, it is detailed in the genetic counselor note. SOCIAL HISTORY:   reports that she has been smoking. She started smoking about 27 years ago. She has a 11.00 pack-year smoking history. She has never used smokeless tobacco. She reports that she does not drink alcohol and does not use drugs. REVIEW OF SYSTEMS:   General: No fever or night sweats. Weight is stable.   ENT: No double or blurred vision, no tinnitus or hearing problem, no dysphagia or sore throat   Respiratory: No chest pain, no shortness of breath, no cough or hemoptysis. Cardiovascular: Denies chest pain, PND or orthopnea. No L E swelling or palpitations. Gastrointestinal: No nausea or vomiting, abdominal pain, diarrhea or constipation. Genitourinary: Denies dysuria, hematuria, frequency, urgency or incontinence. Neurological: Denies headaches, decreased LOC, no sensory or motor focal deficits. Musculoskeletal: No arthralgia no back pain or joint swelling. PHYSICAL EXAM: Shows a well appearing 51 y.o. female who is not in pain or distress. Vital Signs: Blood pressure 117/77, pulse 80, temperature 96.7 °F (35.9 °C), temperature source Temporal, height 5' 6\" (1.676 m), weight 117 lb (53.1 kg), not currently breastfeeding. HEENT: Normocephalic and atraumatic. Pupils are equal, round, reactive to light and accommodation. Extraocular muscles are intact. Neck: Showed no JVD, no carotid bruit . Lungs: Clear to auscultation bilaterally. Heart: Regular without any murmur. Abdomen: Soft, nontender. No hepatosplenomegaly. Extremities: Lower extremities show no edema, clubbing, or cyanosis. Breasts: Examination not done today.  (declined)  Neuro exam: intact cranial nerves bilaterally no motor or sensory deficit, gait is normal. Lymphatic: no adenopathy appreciated in the supraclavicular, axillary, cervical or inguinal area    REVIEW OF LABORATORY DATA:     Results for orders placed or performed in visit on 11/11/21   Vitamin B12 & Folate   Result Value Ref Range    Vitamin B-12 1266     Folate 13.3    Vitamin D 25 Hydroxy   Result Value Ref Range    Vit D, 25-Hydroxy 45.8     Vitamin D3,25 Hydroxy      Vitamin D2, 25 Hydroxy     Lipid, Fasting   Result Value Ref Range    Cholesterol, Fasting 186     Triglyceride, Fasting 84     HDL 45 35 - 70 mg/dL    LDL Calculated 124 0 - 160 mg/dL   Basic Metabolic Panel   Result Value Ref Range    Sodium 139 mmol/L    Chloride 103 mmol/L    Potassium 4.1 mmol/L    BUN 15 mg/dL    CREATININE 0.75     Glucose 104 mg/dL    CO2 29 mmol/L    Calcium 8.7 mg/dL    Gfr Calculated     CBC   Result Value Ref Range    WBC 4.0 10^3/mL    Hemoglobin 14.2 12.0 - 16.0 g/dL    Hematocrit 42.6 36 - 46 %    Platelets 458 K/µL    Neutrophils %      Lymphocytes %      Monocytes %      Eosinophils %      Basophils %      Neutrophils Absolute      Lymphocytes Absolute      Monocytes Absolute      Eosinophils Absolute      Basophils Absolute      RBC 4.69 10^6/µL    MCV 91 fL    MCH 30.3 pg    MCHC 33.4 g/dL    MPV 8.4 fL       REVIEW OF RADIOLOGICAL RESULTS:     MRI breast and mammogram reports reviewed. IMPRESSION:   1. Elevated lifetime risk of breast cancer based on the [address], life time risk is 32.9%  2. Strong family history of breast cancer  3. Tobacco dependence  PLAN:   I had a very long discussion with the patient, explaining the Bianca Bigness model and the risk factor that led to hair falling into the high risk category. Lifetime risk of developing breast cancer in average [facility] woman ranges between 10% and 12%. So her lifetime risk is almost double the normal.   Various governing bodies in the oncology world have determined that lifetime risk of breast cancer above 20% constitutes a group of women who are eligible for enhanced surveillance and should be counseled about genetic breast cancer. Also guidelines determined that this group of women are eligible for breast cancer reduction modalities such as the use of tamoxifen based on the NSABP study of tamoxifen and raloxifene and breast cancer (STAR trial) her tamoxifen was able to decrease the discomfort risk cancer by 50%  Side effects of tamoxifen were explained in detail including hot flashes, possibility of elevated liver enzymes, the rare possibilities of deep venous thrombosis and the even more rare possibility of vaginal bleeding, endometrial hyperplasia or even endometrial carcinoma. Also reviewed rule of raloxifene for chemoprevention.   After reviewing potential side effects of tamoxifen raloxifene patient decided to proceed with raloxifene for chemoprevention. I will call in prescription 60 mg daily. Patient is also due for MRI breast we will order it  Patient counseled tobacco cessation  Return to clinic in 3 months  We will follow up on results of genetic testing  Patient had multiple questions which answered to the best my ability. I spent more than 60 minutes examining, evaluating, reviewing data, counseling the patient and coordinating care. Greater than 50% of time was spent face-to-face with the patient this note is created with the assistance of a speech recognition program.  While intending to generate a document that actually reflects the content of the visit, the document can still have some errors including those of syntax and sound a like substitutions which may escape proof reading. It such instances, actual meaning can be extrapolated by contextual diversion.

## 2022-01-07 NOTE — PROGRESS NOTES
3 [facility]   Hereditary Cancer Risk Assessment     Name: Alannah Glover   YOB: 1975   Date of Consultation: 1/7/22     Ms. Arboleda was seen at the [place] [id number] for genetic counseling on 1/7/22. She is also seen by medical oncologist Dr. Alvin Arrieta. Ms. Arboleda was referred by RUSTY Parnell NP to discuss her elevated lifetime risk for breast cancer which was calculated at her most recent mammogram.     PERSONAL HISTORY   Ms. Arboleda is a 51 y.o.  female with no personal history of cancer. She did undergo BRCA1/2 testing in 2015 but has not had a multigene panel performed. She reports menarche at age 5, first child at age 20, and is premenopausal. Ms. Arboleda has never had a hysterectomy and both ovaries are intact. Ms. Arboleda reports annual mammograms. She has been doing annual mammograms since 2014. She has never required a breast biopsy. At her mammogram on 10/9/2021, a lifetime risk for breast cancer was calculated. According to the [employer] risk model, Ms. Gayle's lifetime risk for breast cancer is approximately 32.9%. If she undergoes genetic testing and it is negative for any clinically significant mutations, her risk will be lowered to 26.7%. FAMILY HISTORY  Ms. Arboleda has one daughter ([phone]) and two granddaughters. She has one full sister who was diagnosed with breast cancer at age 43 and passed away by age 55. She has one full brother, living at age 64. She has a maternal half sister who is living at age 32. Ms. Susana Suarez mother passed away at age 61 from metastatic breast cancer which was initially diagnosed at age 52. She has a maternal uncle with prostate cancer in his 76s. She also reports that her maternal grandmother was diagnosed with breast cancer in her 45s and passed away by age 52. Ms. Susana Suarez father passed away at age 79, no cancer.  She has a paternal uncle with a \"rare blood cancer\" and her paternal grandfather with mesothelioma. Ms. Navarro Siddiqi reports unknown ancestry and denies any known Ashkenazi Caodaism heritage. RISK ASSESSMENT   We discussed that approximately 5-10% of cancers are due to a hereditary gene mutation which causes an increased risk for certain cancers. Hereditary cancers are typically diagnosed at younger ages (under age 46y) and occur in multiple generations of a family. Multiple individuals with the same type of cancer (example: breast or colorectal) or uncommon cancers (example: ovarian, pancreatic, male breast cancer) are also features of hereditary cancers. We discussed that Ms. Gayle's family history is concerning for a hereditary predisposition to cancer given the number of relatives she has with early onset breast cancer. In summary, Ms. Gayle meets the [employer] (NCCN) guidelines for genetic testing based on having two first degree relatives with breast cancer diagnosed under age 48. The NCCN guidelines also recognize that an individual's personal and/or family history may be explained by more than one inherited cancer syndrome. Thus, a multi-gene panel may be more efficient, more cost effecting, and increases the yield of detecting a hereditary mutation which would impact medical management. Given her personal and/or family history, we recommend testing for the following genes at minimum: LILA, CHEK2, NBN, and PALB2. DISCUSSION  We discussed that the BRCA1/2 genes are the most common genes associated with hereditary breast and ovarian cancer. We also discussed that genetic testing is available for multiple other genes related to hereditary cancer.  Some of these genes are known to carry a significant increased risk for several cancers including colon, breast, uterine, ovarian, stomach, and pancreatic cancer, while some of these genes are believed to have a moderate increased risk for breast and other cancers. We discussed the possibility of finding a mutation in genes with limited information to guide medical management, as well we as the possibility of identifying variants of uncertain significance (VUS). We discussed the risks, benefits, and limitations of genetic testing. Possible test results were discussed as well as potential screening and prevention strategies. Specifically, we discussed increased breast cancer surveillance by mammogram and breast MRI as well as the option of prophylactic mastectomy. We discussed the recommendation for prophylactic oophorectomy for results which suggest an increased risk for ovarian cancer. Lastly, we discussed that the results of Ms. Gayle's genetic testing may be beneficial in defining her risk for cancer as well as for her family members. SUMMARY & PLAN  1) Ms. Gayle meets the NCCN criteria for genetic testing based on her family history of early onset breast cancer. 2) Genetic testing via a multi-gene panel was recommended and offered to Ms. Gayle. 3) Ms. Gayle elected to proceed with the CancerNext Expanded + RNA Insight gene panel. 4) Ms. Judy Cranker is aware that she will receive a notification from [employer] if the out of pocket cost for testing exceeds $100 (based on individual insurance plan) and the option to proceed with the self pay price of $249.     5) Informed consent was obtained and a blood sample was sent to [facility]. We will call Ms. Gayle with results as soon as they are available. A follow up appointment may be recommended. A summary letter with results and final medical management recommendations will be sent once available. A total of 40 minutes were spent face to face with Ms. Gayle and 50% of the time was spent educating and counseling. The [facility]  [facility] would be glad to offer our assistance should you have any questions or concerns about this information.  Please feel free to contact us at [phone]. Franklyn Hussein.  [place]ilene South, MS, [facility]   Licensed Genetic Counselor

## 2022-01-07 NOTE — TELEPHONE ENCOUNTER
AVS from 1/7/22    MRI breast this month  rv in 3 months    PT to schedule MRI    RV scheduled 4/8/22 @ 10:30am    PT was given orders, scheduling instructions, AVS and an appt schedule    Electronically signed by James Rodríguez on 1/7/2022 at 2:39 PM

## 2022-01-13 ENCOUNTER — HOSPITAL ENCOUNTER (OUTPATIENT)
Dept: MRI IMAGING | Age: 47
Discharge: HOME | End: 2022-01-15
Payer: COMMERCIAL

## 2022-01-13 DIAGNOSIS — Z91.89 AT HIGH RISK FOR BREAST CANCER: ICD-10-CM

## 2022-01-13 DIAGNOSIS — Z72.0 TOBACCO ABUSE: ICD-10-CM

## 2022-01-13 DIAGNOSIS — Z80.3 FAMILY HISTORY OF BREAST CANCER: ICD-10-CM

## 2022-01-13 PROCEDURE — 6360000004 HC RX CONTRAST MEDICATION: Performed by: INTERNAL MEDICINE

## 2022-01-13 PROCEDURE — 2580000003 HC RX 258: Performed by: INTERNAL MEDICINE

## 2022-01-13 PROCEDURE — 77049 MRI BREAST C-+ W/CAD BI: CPT

## 2022-01-13 PROCEDURE — A9579 GAD-BASE MR CONTRAST NOS,1ML: HCPCS | Performed by: INTERNAL MEDICINE

## 2022-01-13 PROCEDURE — C8908 MRI W/O FOL W/CONT, BREAST,: HCPCS

## 2022-01-13 RX ORDER — 0.9 % SODIUM CHLORIDE 0.9 %
80 INTRAVENOUS SOLUTION INTRAVENOUS ONCE
Status: COMPLETED | OUTPATIENT
Start: 2022-01-13 | End: 2022-01-13

## 2022-01-13 RX ORDER — SODIUM CHLORIDE 0.9 % (FLUSH) 0.9 %
10 SYRINGE (ML) INJECTION PRN
Status: DISCONTINUED | OUTPATIENT
Start: 2022-01-13 | End: 2022-01-16 | Stop reason: HOSPADM

## 2022-01-13 RX ADMIN — SODIUM CHLORIDE 80 ML: 9 INJECTION, SOLUTION INTRAVENOUS at 07:26

## 2022-01-13 RX ADMIN — GADOTERIDOL 10 ML: 279.3 INJECTION, SOLUTION INTRAVENOUS at 07:26

## 2022-01-13 RX ADMIN — SODIUM CHLORIDE, PRESERVATIVE FREE 10 ML: 5 INJECTION INTRAVENOUS at 07:26

## 2022-01-19 ENCOUNTER — TELEPHONE (OUTPATIENT)
Dept: ONCOLOGY | Age: 47
End: 2022-01-19

## 2022-01-19 NOTE — TELEPHONE ENCOUNTER
Left message for Harris Aden notifying her that her genetic test results are available. Requested that she return my phone call at her earliest convenience to review results.

## 2022-01-19 NOTE — TELEPHONE ENCOUNTER
3 [facility]   Hereditary Cancer Risk Assessment     Name: Elian Blank  YOB: 1975  Date of Results Disclosure: 01/19/22      HISTORY   Ms. Thi Castillo was seen for genetic counseling at the request of RUSTY Banks NP due to her family history of cancer. At that time, Ms. Gayle chose to pursue genetic testing via the CancerProfistat Expanded + RNA gene panel. These results were discussed with Ms. Gayle via telephone. A summary of Ms. Gayle's results and recommendations are below. RESULTS  [facility] CancerNext-Expanded Panel + RNAinsight: NEGATIVE - NO CLINICALLY SIGNIFICANT MUTATIONS DETECTED   This panel included the analysis of 77 genes associated with hereditary cancer including: AIP, ALK, APC, LILA, BAP1, BARD1, BLM, BMPR1A, BRCA1, BRCA2, BRIP1, CDC73, CDH1, CDK4, CDKN1B, CDKN2A, CHEK2, CTNNA1, DICER1, EGFR, EGLN1, EPCAM, FANCC, FH, FLCN, GALNT12, GREM1, HOXB13, KIF1B, KIT1, LZTR1, MAX, MEN1, MET, MITF, MLH1, MSH2, MSH3, MSH6, MUTYH, NBN, NF1, NF2, NTHL1, PALB2, PDGFRA, PHOX2B, PMS2, POLD1, POLE, POT1, UGSGH8S, PTCH1, PTEN, RAD51C, RAD51D, RB1, RECQL, RET, SDHA, SDHAF2, SDHB, SDHC, ,SDHD, SMAD4, SMARCA4, SMARCB1, SMARCE1, STK11, SUFU, CNEX275, TP53, TSC1, TSC2, VHL, and XRCC2. In addition, no clinically relevant aberrant RNA transcripts were detected in select genes. Please refer to genetic test report for technical details. We discussed that Ms. Gayle's negative test result greatly reduces the likelihood that she carries a hereditary gene mutation. However, it is possible that her family history of cancer is due to a hereditary mutation which she did not inherit. It is also possible that her family history of cancer may be due to a gene for which testing was not performed or which has yet to be discovered. RECOMMENDATIONS  1) While Ms. Gayle does not carry a known hereditary gene mutation, her risk for breast cancer may still be elevated due to her remaining family history of breast cancer. Based on Ms. Gayle's personal risk factors and family history of breast cancer, her estimated lifetime risk for breast cancer is 26.7% according to the [employer] risk model. The [address] [place] [address] (NCCN) recommends that women with a lifetime risk of breast cancer 20% or higher consider the following screening and risk reducing options:     NCCN Recommendation Age to Begin Frequency    Breast awareness - Women should be familiar with their breasts and promptly report changes to their healthcare provider. Periodic, consistent breast self-examination (BSE) may be beneficial  Individualized  N/A    Clinical Breast Examination  Individualized Every 6-12 months   Breast MRI with contrast  36years old  Annual   Mammogram (consider tomosynthesis)  35years old  Annual    Consider risk reducing agents (i.e. Tamoxifen)  Individualized  N/A    *age to begin screening is based on the onset of breast cancer in Ms. Gayle's family    2) Ms. Gayle should continue general population cancer screening guidelines as directed by her physicians. RECOMMENDATIONS FOR FAMILY MEMBERS   1) Genetic testing is not recommended for Ms. Gayle's children based on her negative test results. However, this recommendation does not take into consideration any family history of cancer in their paternal family. 2) It is possible that the cancers in Ms. Gayle's family are due to a hereditary gene mutation that she did not inherit. Therefore, her relatives (particularly those with a current or previous cancer diagnosis) may consider genetic counseling and testing to clarify this possibility. Relatives may contact the [address] [place], [place] [address]y[email or web address] at [phone] or locate a genetic counselor at [email or web address].     3) We encourage Ms. Gayle's relatives to discuss their family history of cancer with their physicians to determine the most appropriate cancer screening recommendations. Ms. Maddy Galindo female relatives may benefit from a formal breast cancer risk assessment by the [employer] or Eri Hare risk models to determine if additional breast cancer screening is warranted. SUMMARY & PLAN   1) Ms. Garzas genetic test results are negative meaning there were no clinically significant mutations detected in the 77 genes analyzed. 2) Ms. Garzas lifetime risk for breast cancer is 26.7% according to the Eri Hare risk model. She may consider the NCCN guidelines outlined above for breast cancer surveillance. This includes annual breast MRI screening staggered 6 months apart from annual mammograms. She recently completed a breast MRI which was negative. She was encouraged to follow up with Dr. Yung Casper on 4/8/22 to review results and future recommendations. 3) There are no other changes in medical management for Ms. Gayle based on her negative genetic test results. 4) We encourage Ms. Gayle to contact us every 1-2 years to determine if there are any new genetic testing or research options available. 5) We encourage Ms. Gayle to contact us with updates to her personal and/or familys cancer history as this information may alter our assessment and/or recommendations. The [address] [place] [address] National Program would be glad to offer our assistance should you have any questions or concerns about this information. Please feel free to contact us at [phone]. [facility].  Gina Gould [address], [facility]   Licensed Genetic Counselor         CC:  MD Rosi Hart, APRN - NP

## 2022-01-20 DIAGNOSIS — F41.9 ANXIETY: ICD-10-CM

## 2022-01-20 RX ORDER — ALPRAZOLAM 0.5 MG/1
0.5 TABLET ORAL DAILY PRN
Qty: 30 TABLET | Refills: 0 | Status: SHIPPED | OUTPATIENT
Start: 2022-01-20 | End: 2022-02-18 | Stop reason: SDUPTHER

## 2022-01-20 RX ORDER — ACETAMINOPHEN 160 MG
1 TABLET,DISINTEGRATING ORAL DAILY
Qty: 30 CAPSULE | Refills: 5 | Status: SHIPPED | OUTPATIENT
Start: 2022-01-20 | End: 2022-02-18 | Stop reason: SDUPTHER

## 2022-02-18 DIAGNOSIS — F41.9 ANXIETY: ICD-10-CM

## 2022-02-18 RX ORDER — ACETAMINOPHEN 160 MG
1 TABLET,DISINTEGRATING ORAL DAILY
Qty: 30 CAPSULE | Refills: 5 | Status: SHIPPED | OUTPATIENT
Start: 2022-02-18 | End: 2022-03-17 | Stop reason: SDUPTHER

## 2022-02-18 RX ORDER — ALPRAZOLAM 0.5 MG/1
0.5 TABLET ORAL DAILY PRN
Qty: 30 TABLET | Refills: 0 | Status: SHIPPED | OUTPATIENT
Start: 2022-02-18 | End: 2022-03-17 | Stop reason: SDUPTHER

## 2022-02-18 NOTE — TELEPHONE ENCOUNTER
Last visit: 07/16/21  Last Med refill: Xanax 01/20/22, VIT D 01/20/22, cyanocobalamin 01/20/22  Does patient have enough medication for 72 hours: Yes.     Next Visit Date:  Future Appointments   Date Time Provider Yolanda Jordan   4/8/2022 10:30 AM Matti Coy MD [facility] Maintenance   Topic Date Due    Hepatitis C screen  Never done    Colorectal Cancer Screen  Never done    Flu vaccine (1) 09/01/2021    Cervical cancer screen  12/29/2021    COVID-19 Vaccine (2 - Booster for Lisa series) 02/03/2022    Depression Screen  07/16/2022    Breast cancer screen  10/09/2022    DTaP/Tdap/Td vaccine (2 - Td or Tdap) 04/09/2024    Lipid screen  11/11/2026    Pneumococcal 0-64 years Vaccine (2 of 2 - PPSV23) 01/31/2039    Hepatitis A vaccine  Aged Out    Hepatitis B vaccine  Aged Out    Hib vaccine  Aged Out    Meningococcal (ACWY) vaccine  Aged Out    HIV screen  Discontinued       No results found for: LABA1C          ( goal A1C is < 7)   No results found for: LABMICR  LDL Cholesterol (mg/dL)   Date Value   03/27/2019 112   01/03/2017 119     LDL Calculated (mg/dL)   Date Value   11/11/2021 124   07/08/2020 120       (goal LDL is <100)   AST (U/L)   Date Value   07/08/2020 28     ALT (U/L)   Date Value   07/08/2020 24     BUN (mg/dL)   Date Value   11/11/2021 15     BP Readings from Last 3 Encounters:   01/07/22 117/77   07/16/21 116/82   02/11/21 116/65          (goal 120/80)    All Future Testing planned in [facility]  Lab Frequency Next Occurrence               Patient Active Problem List:     Facet arthritis of lumbar region     Spondylolisthesis at L4-L5 level     Acute right-sided low back pain with right-sided sciatica     Tobacco abuse     Cigarette smoker     Nephrolithiasis

## 2022-03-17 DIAGNOSIS — F41.9 ANXIETY: ICD-10-CM

## 2022-03-18 RX ORDER — ALPRAZOLAM 0.5 MG/1
0.5 TABLET ORAL DAILY PRN
Qty: 30 TABLET | Refills: 0 | Status: SHIPPED | OUTPATIENT
Start: 2022-03-18 | End: 2022-04-18 | Stop reason: SDUPTHER

## 2022-03-18 RX ORDER — ACETAMINOPHEN 160 MG
1 TABLET,DISINTEGRATING ORAL DAILY
Qty: 30 CAPSULE | Refills: 5 | Status: SHIPPED | OUTPATIENT
Start: 2022-03-18 | End: 2023-03-18

## 2022-04-18 DIAGNOSIS — F41.9 ANXIETY: ICD-10-CM

## 2022-04-18 RX ORDER — ACETAMINOPHEN 160 MG
1 TABLET,DISINTEGRATING ORAL DAILY
Qty: 30 CAPSULE | Refills: 5 | OUTPATIENT
Start: 2022-04-18 | End: 2023-04-18

## 2022-04-18 NOTE — TELEPHONE ENCOUNTER
Last visit: 10/29/21  Last Med refill: 03/18/22  Does patient have enough medication for 72 hours: Yes    Next Visit Date:  No future appointments.     Health Maintenance   Topic Date Due    Hepatitis C screen  Never done    Pneumococcal 0-64 years Vaccine (2 - PCV) 04/28/2017    Colorectal Cancer Screen  Never done    Cervical cancer screen  12/29/2021    COVID-19 Vaccine (2 - Booster for Lisa series) 02/03/2022    Depression Screen  07/16/2022    Flu vaccine (Season Ended) 09/01/2022    Breast cancer screen  10/09/2022    DTaP/Tdap/Td vaccine (2 - Td or Tdap) 04/09/2024    Lipid screen  11/11/2026    Hepatitis A vaccine  Aged Out    Hepatitis B vaccine  Aged Out    Hib vaccine  Aged Out    Meningococcal (ACWY) vaccine  Aged Out    HIV screen  Discontinued       No results found for: LABA1C          ( goal A1C is < 7)   No results found for: LABMICR  LDL Cholesterol (mg/dL)   Date Value   03/27/2019 112   01/03/2017 119     LDL Calculated (mg/dL)   Date Value   11/11/2021 124   07/08/2020 120       (goal LDL is <100)   AST (U/L)   Date Value   07/08/2020 28     ALT (U/L)   Date Value   07/08/2020 24     BUN (mg/dL)   Date Value   11/11/2021 15     BP Readings from Last 3 Encounters:   01/07/22 117/77   07/16/21 116/82   02/11/21 116/65          (goal 120/80)    All Future Testing planned in [facility]  Lab Frequency Next Occurrence               Patient Active Problem List:     Facet arthritis of lumbar region     Spondylolisthesis at L4-L5 level     Acute right-sided low back pain with right-sided sciatica     Tobacco abuse     Cigarette smoker     Nephrolithiasis

## 2022-04-19 RX ORDER — ALPRAZOLAM 0.5 MG/1
0.5 TABLET ORAL DAILY PRN
Qty: 30 TABLET | Refills: 0 | Status: SHIPPED | OUTPATIENT
Start: 2022-04-19 | End: 2022-05-18 | Stop reason: SDUPTHER

## 2022-05-18 DIAGNOSIS — F41.9 ANXIETY: ICD-10-CM

## 2022-05-18 RX ORDER — ALPRAZOLAM 0.5 MG/1
0.5 TABLET ORAL DAILY PRN
Qty: 30 TABLET | Refills: 0 | Status: SHIPPED | OUTPATIENT
Start: 2022-05-18 | End: 2022-06-17 | Stop reason: SDUPTHER

## 2022-05-18 NOTE — TELEPHONE ENCOUNTER
Last visit: 10/29/21  Last Med refill: 04/19/22  Does patient have enough medication for 72 hours: Yes    Next Visit Date:  No future appointments.     Health Maintenance   Topic Date Due    Hepatitis C screen  Never done    Pneumococcal 0-64 years Vaccine (2 - PCV) 04/28/2017    Colorectal Cancer Screen  Never done    Cervical cancer screen  12/29/2021    COVID-19 Vaccine (2 - Booster for Lisa series) 02/03/2022    Depression Screen  07/16/2022    Flu vaccine (Season Ended) 09/01/2022    Breast cancer screen  10/09/2022    DTaP/Tdap/Td vaccine (2 - Td or Tdap) 04/09/2024    Lipids  11/11/2026    Hepatitis A vaccine  Aged Out    Hepatitis B vaccine  Aged Out    Hib vaccine  Aged Out    Meningococcal (ACWY) vaccine  Aged Out    HIV screen  Discontinued       No results found for: LABA1C          ( goal A1C is < 7)   No results found for: LABMICR  LDL Cholesterol (mg/dL)   Date Value   03/27/2019 112   01/03/2017 119     LDL Calculated (mg/dL)   Date Value   11/11/2021 124   07/08/2020 120       (goal LDL is <100)   AST (U/L)   Date Value   07/08/2020 28     ALT (U/L)   Date Value   07/08/2020 24     BUN (mg/dL)   Date Value   11/11/2021 15     BP Readings from Last 3 Encounters:   01/07/22 117/77   07/16/21 116/82   02/11/21 116/65          (goal 120/80)    All Future Testing planned in [facility]  Lab Frequency Next Occurrence               Patient Active Problem List:     Facet arthritis of lumbar region     Spondylolisthesis at L4-L5 level     Acute right-sided low back pain with right-sided sciatica     Tobacco abuse     Cigarette smoker     Nephrolithiasis

## 2022-06-17 DIAGNOSIS — F41.9 ANXIETY: ICD-10-CM

## 2022-06-17 RX ORDER — ALPRAZOLAM 0.5 MG/1
0.5 TABLET ORAL DAILY PRN
Qty: 30 TABLET | Refills: 0 | Status: SHIPPED | OUTPATIENT
Start: 2022-06-17 | End: 2022-07-20 | Stop reason: SDUPTHER

## 2022-07-17 DIAGNOSIS — F41.9 ANXIETY: ICD-10-CM

## 2022-07-18 RX ORDER — ALPRAZOLAM 0.5 MG/1
0.5 TABLET ORAL DAILY PRN
Qty: 30 TABLET | Refills: 0 | OUTPATIENT
Start: 2022-07-18 | End: 2023-07-18

## 2022-07-20 DIAGNOSIS — F41.9 ANXIETY: ICD-10-CM

## 2022-07-20 NOTE — TELEPHONE ENCOUNTER
Last visit: 10/29/2021  Last Med refill: 06/17/2022  Does patient have enough medication for 72 hours:No.  Patient needs this medication to sleep.   OTC's do not aid with sleep    Next Visit Date:08/25/2022  Future Appointments   Date Time Provider Yolanda Jordan   8/25/2022  2:45 PM RUSTY Connell - NP [facility] [address] Maintenance   Topic Date Due    Hepatitis C screen  Never done    Pneumococcal 0-64 years Vaccine (2 - PCV) 04/28/2017    Colorectal Cancer Screen  Never done    Cervical cancer screen  12/29/2021    COVID-19 Vaccine (2 - Booster for Lisa series) 02/03/2022    Depression Screen  07/16/2022    Flu vaccine (1) 09/01/2022    Breast cancer screen  10/09/2022    DTaP/Tdap/Td vaccine (2 - Td or Tdap) 04/09/2024    Lipids  11/11/2026    Hepatitis A vaccine  Aged Out    Hepatitis B vaccine  Aged Out    Hib vaccine  Aged Out    Meningococcal (ACWY) vaccine  Aged Out    HIV screen  Discontinued       No results found for: LABA1C          ( goal A1C is < 7)   No results found for: LABMICR  LDL Cholesterol (mg/dL)   Date Value   03/27/2019 112   01/03/2017 119     LDL Calculated (mg/dL)   Date Value   11/11/2021 124   07/08/2020 120       (goal LDL is <100)   AST (U/L)   Date Value   07/08/2020 28     ALT (U/L)   Date Value   07/08/2020 24     BUN (mg/dL)   Date Value   11/11/2021 15     BP Readings from Last 3 Encounters:   01/07/22 117/77   07/16/21 116/82   02/11/21 116/65          (goal 120/80)    All Future Testing planned in [facility]  Lab Frequency Next Occurrence               Patient Active Problem List:     Facet arthritis of lumbar region     Spondylolisthesis at L4-L5 level     Acute right-sided low back pain with right-sided sciatica     Tobacco abuse     Cigarette smoker     Nephrolithiasis

## 2022-07-21 RX ORDER — ALPRAZOLAM 0.5 MG/1
0.5 TABLET ORAL DAILY PRN
Qty: 30 TABLET | Refills: 0 | Status: SHIPPED | OUTPATIENT
Start: 2022-07-21 | End: 2022-08-19 | Stop reason: SDUPTHER

## 2022-08-19 DIAGNOSIS — F41.9 ANXIETY: ICD-10-CM

## 2022-08-19 RX ORDER — ALPRAZOLAM 0.5 MG/1
0.5 TABLET ORAL DAILY PRN
Qty: 30 TABLET | Refills: 0 | Status: SHIPPED | OUTPATIENT
Start: 2022-08-19 | End: 2022-09-18 | Stop reason: SDUPTHER

## 2022-08-25 ENCOUNTER — OFFICE VISIT (OUTPATIENT)
Dept: FAMILY MEDICINE CLINIC | Age: 47
End: 2022-08-25
Payer: COMMERCIAL

## 2022-08-25 VITALS
BODY MASS INDEX: 18.93 KG/M2 | TEMPERATURE: 98.3 F | HEART RATE: 90 BPM | DIASTOLIC BLOOD PRESSURE: 74 MMHG | RESPIRATION RATE: 16 BRPM | WEIGHT: 117.3 LBS | SYSTOLIC BLOOD PRESSURE: 118 MMHG | OXYGEN SATURATION: 98 %

## 2022-08-25 DIAGNOSIS — Z13.1 DIABETES MELLITUS SCREENING: ICD-10-CM

## 2022-08-25 DIAGNOSIS — E55.9 VITAMIN D DEFICIENCY: ICD-10-CM

## 2022-08-25 DIAGNOSIS — Z11.59 ENCOUNTER FOR HEPATITIS C SCREENING TEST FOR LOW RISK PATIENT: ICD-10-CM

## 2022-08-25 DIAGNOSIS — Z13.220 LIPID SCREENING: ICD-10-CM

## 2022-08-25 DIAGNOSIS — Z00.00 ENCOUNTER FOR ANNUAL PHYSICAL EXAM: Primary | ICD-10-CM

## 2022-08-25 DIAGNOSIS — R06.2 WHEEZING: ICD-10-CM

## 2022-08-25 DIAGNOSIS — Z12.31 ENCOUNTER FOR SCREENING MAMMOGRAM FOR MALIGNANT NEOPLASM OF BREAST: ICD-10-CM

## 2022-08-25 DIAGNOSIS — R05.9 COUGH: ICD-10-CM

## 2022-08-25 PROCEDURE — 99396 PREV VISIT EST AGE 40-64: CPT | Performed by: NURSE PRACTITIONER

## 2022-08-25 RX ORDER — ALBUTEROL SULFATE 2.5 MG/3ML
2.5 SOLUTION RESPIRATORY (INHALATION) EVERY 6 HOURS PRN
Qty: 120 EACH | Refills: 1 | Status: SHIPPED | OUTPATIENT
Start: 2022-08-25 | End: 2023-08-25

## 2022-08-25 ASSESSMENT — PATIENT HEALTH QUESTIONNAIRE - PHQ9
1. LITTLE INTEREST OR PLEASURE IN DOING THINGS: 0
SUM OF ALL RESPONSES TO PHQ9 QUESTIONS 1 & 2: 0
SUM OF ALL RESPONSES TO PHQ QUESTIONS 1-9: 0
SUM OF ALL RESPONSES TO PHQ QUESTIONS 1-9: 0
2. FEELING DOWN, DEPRESSED OR HOPELESS: 0
SUM OF ALL RESPONSES TO PHQ QUESTIONS 1-9: 0
SUM OF ALL RESPONSES TO PHQ QUESTIONS 1-9: 0

## 2022-08-25 NOTE — PROGRESS NOTES
Glenis Reyes (:  1975) is a 49 y.o. female,Established patient, here for evaluation of the following chief complaint(s): Annual Exam and Anxiety         ASSESSMENT/PLAN:  1. Encounter for annual physical exam  -     CBC; Future  -     Comprehensive Metabolic Panel; Future  2. Cough  -     albuterol (PROVENTIL) (2.5 MG/3ML) 0.083% nebulizer solution; Take 3 mLs by nebulization every 6 hours as needed for Wheezing or Shortness of Breath, Disp-120 each, R-1Normal  3. Wheezing  -     albuterol (PROVENTIL) (2.5 MG/3ML) 0.083% nebulizer solution; Take 3 mLs by nebulization every 6 hours as needed for Wheezing or Shortness of Breath, Disp-120 each, R-1Normal  4. Vitamin D deficiency  5. Lipid screening  -     Lipid, Fasting; Future  6. Diabetes mellitus screening  -     Hemoglobin A1C; Future  7. Encounter for hepatitis C screening test for low risk patient  -     Hepatitis C Antibody; Future  8. Encounter for screening mammogram for malignant neoplasm of breast  -     DANA DIGITAL SCREEN W OR WO CAD BILATERAL; Future      Return in about 3 months (around 2022), or if symptoms worsen or fail to improve, for medication follow up. Subjective   SUBJECTIVE/OBJECTIVE:  Annual physical and med check. History of but not limited to anxiety, current smoker. Reports is doing well. Continues to work long hours. Medications work well for her. Reports a good appetite, drinking fluids. Normal bowel and bladder pattern. Sleeping well. Mood stable. Due for fasting labs. Review of Systems   Constitutional:  Negative for activity change, appetite change, fatigue and fever. HENT:  Negative for congestion, rhinorrhea and sore throat. Eyes:  Negative for visual disturbance. Respiratory:  Negative for cough and shortness of breath. Cardiovascular:  Negative for chest pain and palpitations. Gastrointestinal:  Negative for constipation, diarrhea, nausea, rectal pain and vomiting.    Endocrine: Negative for polydipsia, polyphagia and polyuria. Genitourinary:  Negative for dysuria and urgency. Allergic/Immunologic: Negative for immunocompromised state. Neurological:  Negative for syncope, light-headedness and headaches. Psychiatric/Behavioral:  Positive for sleep disturbance. Negative for decreased concentration, dysphoric mood and suicidal ideas. The patient is nervous/anxious. Well controlled with current medication        Objective   Physical Exam  Vitals and nursing note reviewed. Constitutional:       General: She is not in acute distress. Appearance: Normal appearance. She is well-developed and well-groomed. She is not ill-appearing or toxic-appearing. HENT:      Head: Normocephalic and atraumatic. Right Ear: Tympanic membrane, ear canal and external ear normal. No middle ear effusion. There is no impacted cerumen. Tympanic membrane is not erythematous, retracted or bulging. Left Ear: Tympanic membrane, ear canal and external ear normal.  No middle ear effusion. There is no impacted cerumen. Tympanic membrane is not erythematous, retracted or bulging. Nose: Nose normal. No mucosal edema or rhinorrhea. Right Turbinates: Not enlarged or swollen. Left Turbinates: Not enlarged or swollen. Mouth/Throat:      Lips: Pink. Mouth: Mucous membranes are moist.      Pharynx: Oropharynx is clear. Uvula midline. No oropharyngeal exudate or posterior oropharyngeal erythema. Eyes:      General: Lids are normal. Vision grossly intact. Conjunctiva/sclera: Conjunctivae normal.   Neck:      Thyroid: No thyroid tenderness. Vascular: No carotid bruit. Trachea: Trachea normal.   Cardiovascular:      Rate and Rhythm: Normal rate and regular rhythm. Pulses: Normal pulses. Carotid pulses are 2+ on the right side and 2+ on the left side. Radial pulses are 2+ on the right side and 2+ on the left side.         Dorsalis pedis pulses are 2+ on the right side and 2+ on the left side. Posterior tibial pulses are 2+ on the right side and 2+ on the left side. Heart sounds: Normal heart sounds, S1 normal and S2 normal. No murmur heard. Pulmonary:      Effort: Pulmonary effort is normal. No prolonged expiration or respiratory distress. Breath sounds: Normal breath sounds and air entry. No decreased breath sounds, wheezing, rhonchi or rales. Abdominal:      General: There is no distension. Palpations: Abdomen is soft. Tenderness: There is no abdominal tenderness. Musculoskeletal:         General: Normal range of motion. Cervical back: Normal range of motion. No signs of trauma or torticollis. No pain with movement. Right lower leg: No edema. Left lower leg: No edema. Lymphadenopathy:      Cervical: No cervical adenopathy. Skin:     General: Skin is warm and dry. Capillary Refill: Capillary refill takes less than 2 seconds. Coloration: Skin is not ashen, cyanotic, jaundiced or pale. Neurological:      Mental Status: She is alert and oriented to person, place, and time. Motor: Motor function is intact. Gait: Gait normal.   Psychiatric:         Attention and Perception: Attention and perception normal.         Mood and Affect: Mood and affect normal.         Speech: Speech normal.         Behavior: Behavior normal. Behavior is cooperative. Thought Content: Thought content normal.         Cognition and Memory: Cognition and memory normal.         Judgment: Judgment normal.                An electronic signature was used to authenticate this note.     --RUSTY Todd NP

## 2022-08-29 ASSESSMENT — ENCOUNTER SYMPTOMS
SHORTNESS OF BREATH: 0
SORE THROAT: 0
RHINORRHEA: 0
NAUSEA: 0
DIARRHEA: 0
COUGH: 0
CONSTIPATION: 0
VOMITING: 0
RECTAL PAIN: 0

## 2022-08-29 ASSESSMENT — VISUAL ACUITY: OU: 1

## 2022-09-18 DIAGNOSIS — F41.9 ANXIETY: ICD-10-CM

## 2022-09-19 NOTE — TELEPHONE ENCOUNTER
LOV 8-25-22  [facility] 8-19-22    Health Maintenance   Topic Date Due    Hepatitis C screen  Never done    Pneumococcal 0-64 years Vaccine (2 - PCV) 04/28/2017    Colorectal Cancer Screen  Never done    Cervical cancer screen  12/29/2021    COVID-19 Vaccine (2 - Booster for Lisa series) 02/03/2022    Flu vaccine (1) 09/01/2022    Breast cancer screen  10/09/2022    Depression Screen  08/25/2023    DTaP/Tdap/Td vaccine (2 - Td or Tdap) 04/09/2024    Lipids  11/11/2026    Hepatitis A vaccine  Aged Out    Hepatitis B vaccine  Aged Out    Hib vaccine  Aged Out    Meningococcal (ACWY) vaccine  Aged Out    HIV screen  Discontinued             (applicable per patient's age: Cancer Screenings, Depression Screening, Fall Risk Screening, Immunizations)    LDL Cholesterol (mg/dL)   Date Value   03/27/2019 112     LDL Calculated (mg/dL)   Date Value   11/11/2021 124     AST (U/L)   Date Value   07/08/2020 28     ALT (U/L)   Date Value   07/08/2020 24     BUN (mg/dL)   Date Value   11/11/2021 15      (goal A1C is < 7)   (goal LDL is <100) need 30-50% reduction from baseline     BP Readings from Last 3 Encounters:   08/25/22 118/74   01/07/22 117/77   07/16/21 116/82    (goal /80)      All Future Testing planned in [facility]:  Lab Frequency Next Occurrence   CBC Once 08/25/2022   Comprehensive Metabolic Panel Once 30/85/0485   Lipid, Fasting Once 08/25/2022   Hemoglobin A1C Once 08/25/2022   Hepatitis C Antibody Once 08/25/2022   DANA DIGITAL SCREEN W OR WO CAD BILATERAL Once 08/25/2022       Next Visit Date:  Future Appointments   Date Time Provider Yolanda Jordan   12/1/2022  3:45 PM RUSTY Parker NP [facility]            Patient Active Problem List:     Facet arthritis of lumbar region     Spondylolisthesis at L4-L5 level     Acute right-sided low back pain with right-sided sciatica     Tobacco abuse     Cigarette smoker     Nephrolithiasis

## 2022-09-20 RX ORDER — ALPRAZOLAM 0.5 MG/1
0.5 TABLET ORAL DAILY PRN
Qty: 30 TABLET | Refills: 0 | Status: SHIPPED | OUTPATIENT
Start: 2022-09-20 | End: 2022-10-18 | Stop reason: SDUPTHER

## 2022-10-18 DIAGNOSIS — F41.9 ANXIETY: ICD-10-CM

## 2022-10-19 RX ORDER — ALPRAZOLAM 0.5 MG/1
0.5 TABLET ORAL DAILY PRN
Qty: 30 TABLET | Refills: 0 | Status: SHIPPED | OUTPATIENT
Start: 2022-10-19 | End: 2023-10-19

## 2022-10-19 NOTE — TELEPHONE ENCOUNTER
LOV 8/25/22  LRF 9/20/22  RTO 3 months,     Health Maintenance   Topic Date Due    Hepatitis C screen  Never done    Colorectal Cancer Screen  Never done    Cervical cancer screen  12/29/2021    COVID-19 Vaccine (2 - Booster for Lisa series) 02/03/2022    Flu vaccine (1) 08/01/2022    Breast cancer screen  10/09/2022    Depression Screen  08/25/2023    DTaP/Tdap/Td vaccine (2 - Td or Tdap) 04/09/2024    Lipids  11/11/2026    Pneumococcal 0-64 years Vaccine  Completed    Hepatitis A vaccine  Aged Out    Hib vaccine  Aged Out    Meningococcal (ACWY) vaccine  Aged Out    HIV screen  Discontinued             (applicable per patient's age: Cancer Screenings, Depression Screening, Fall Risk Screening, Immunizations)    LDL Cholesterol (mg/dL)   Date Value   03/27/2019 112     LDL Calculated (mg/dL)   Date Value   11/11/2021 124     AST (U/L)   Date Value   07/08/2020 28     ALT (U/L)   Date Value   07/08/2020 24     BUN (mg/dL)   Date Value   11/11/2021 15      (goal A1C is < 7)   (goal LDL is <100) need 30-50% reduction from baseline     BP Readings from Last 3 Encounters:   08/25/22 118/74   01/07/22 117/77   07/16/21 116/82    (goal /80)      All Future Testing planned in [facility]:  Lab Frequency Next Occurrence   CBC Once 08/25/2022   Comprehensive Metabolic Panel Once 34/38/4712   Lipid, Fasting Once 08/25/2022   Hemoglobin A1C Once 08/25/2022   Hepatitis C Antibody Once 08/25/2022   DANA DIGITAL SCREEN W OR WO CAD BILATERAL Once 08/25/2022       Next Visit Date:  Future Appointments   Date Time Provider Yolanda Jordan   12/1/2022  3:45 PM Karrie Kawasaki, APRN - [facility]            Patient Active Problem List:     Facet arthritis of lumbar region     Spondylolisthesis at L4-L5 level     Acute right-sided low back pain with right-sided sciatica     Tobacco abuse     Cigarette smoker     Nephrolithiasis

## 2022-11-17 DIAGNOSIS — F41.9 ANXIETY: ICD-10-CM

## 2022-11-17 RX ORDER — ALPRAZOLAM 0.5 MG/1
0.5 TABLET ORAL DAILY PRN
Qty: 30 TABLET | Refills: 0 | Status: SHIPPED | OUTPATIENT
Start: 2022-11-17 | End: 2022-12-16 | Stop reason: SDUPTHER

## 2022-11-17 NOTE — TELEPHONE ENCOUNTER
Last visit: 8/25/22  Last Med refill: 10/19/22  Does patient have enough medication for 72 hours: Yes    Next Visit Date:  Future Appointments   Date Time Provider Yolanda Jordan   1/3/2023  3:45 PM RUSTY Alston NP [facility] [address] Maintenance   Topic Date Due    Hepatitis C screen  Never done    Colorectal Cancer Screen  Never done    Cervical cancer screen  12/29/2021    COVID-19 Vaccine (2 - Booster for Lisa series) 02/03/2022    Flu vaccine (1) 08/01/2022    Breast cancer screen  10/09/2022    Depression Screen  08/25/2023    DTaP/Tdap/Td vaccine (2 - Td or Tdap) 04/09/2024    Lipids  11/11/2026    Pneumococcal 0-64 years Vaccine  Completed    Hepatitis A vaccine  Aged Out    Hib vaccine  Aged Out    Meningococcal (ACWY) vaccine  Aged Out    HIV screen  Discontinued       No results found for: LABA1C          ( goal A1C is < 7)   No results found for: LABMICR  LDL Cholesterol (mg/dL)   Date Value   03/27/2019 112   01/03/2017 119     LDL Calculated (mg/dL)   Date Value   11/11/2021 124   07/08/2020 120       (goal LDL is <100)   AST (U/L)   Date Value   07/08/2020 28     ALT (U/L)   Date Value   07/08/2020 24     BUN (mg/dL)   Date Value   11/11/2021 15     BP Readings from Last 3 Encounters:   08/25/22 118/74   01/07/22 117/77   07/16/21 116/82          (goal 120/80)    All Future Testing planned in [facility]  Lab Frequency Next Occurrence   CBC Once 08/25/2022   Comprehensive Metabolic Panel Once 70/36/2710   Lipid, Fasting Once 08/25/2022   Hemoglobin A1C Once 08/25/2022   Hepatitis C Antibody Once 08/25/2022   DANA DIGITAL SCREEN W OR WO CAD BILATERAL Once 08/25/2022               Patient Active Problem List:     Facet arthritis of lumbar region     Spondylolisthesis at L4-L5 level     Acute right-sided low back pain with right-sided sciatica     Tobacco abuse     Cigarette smoker     Nephrolithiasis

## 2022-12-16 DIAGNOSIS — F41.9 ANXIETY: ICD-10-CM

## 2022-12-16 RX ORDER — ALPRAZOLAM 0.5 MG/1
0.5 TABLET ORAL DAILY PRN
Qty: 30 TABLET | Refills: 0 | Status: SHIPPED | OUTPATIENT
Start: 2022-12-16 | End: 2023-12-16

## 2022-12-16 NOTE — TELEPHONE ENCOUNTER
LOV 8/25/22  LRF 11/17/22    Next appt 1/3/22      Health Maintenance   Topic Date Due    Hepatitis C screen  Never done    Colorectal Cancer Screen  Never done    Cervical cancer screen  12/29/2021    COVID-19 Vaccine (2 - Booster for Lisa series) 02/03/2022    Flu vaccine (1) 08/01/2022    Breast cancer screen  10/09/2022    Depression Screen  08/25/2023    DTaP/Tdap/Td vaccine (2 - Td or Tdap) 04/09/2024    Lipids  11/11/2026    Pneumococcal 0-64 years Vaccine  Completed    Hepatitis A vaccine  Aged Out    Hib vaccine  Aged Out    Meningococcal (ACWY) vaccine  Aged Out    HIV screen  Discontinued             (applicable per patient's age: Cancer Screenings, Depression Screening, Fall Risk Screening, Immunizations)    LDL Cholesterol (mg/dL)   Date Value   03/27/2019 112     LDL Calculated (mg/dL)   Date Value   11/11/2021 124     AST (U/L)   Date Value   07/08/2020 28     ALT (U/L)   Date Value   07/08/2020 24     BUN (mg/dL)   Date Value   11/11/2021 15      (goal A1C is < 7)   (goal LDL is <100) need 30-50% reduction from baseline     BP Readings from Last 3 Encounters:   08/25/22 118/74   01/07/22 117/77   07/16/21 116/82    (goal /80)      All Future Testing planned in [facility]:  Lab Frequency Next Occurrence   CBC Once 08/25/2022   Comprehensive Metabolic Panel Once 32/01/9240   Lipid, Fasting Once 08/25/2022   Hemoglobin A1C Once 08/25/2022   Hepatitis C Antibody Once 08/25/2022   DANA DIGITAL SCREEN W OR WO CAD BILATERAL Once 08/25/2022       Next Visit Date:  Future Appointments   Date Time Provider Yolanda Jordan   1/3/2023  3:45 PM John Smith, APRN - NP Evgeny Ruiz [facility]            Patient Active Problem List:     Facet arthritis of lumbar region     Spondylolisthesis at L4-L5 level     Acute right-sided low back pain with right-sided sciatica     Tobacco abuse     Cigarette smoker     Nephrolithiasis

## 2022-12-27 LAB
ALBUMIN SERPL-MCNC: 4 G/DL
ALP BLD-CCNC: 61 U/L
ALT SERPL-CCNC: 13 U/L
ANION GAP SERPL CALCULATED.3IONS-SCNC: 5 MMOL/L
ANTIBODY: NONREACTIVE
AST SERPL-CCNC: 17 U/L
AVERAGE GLUCOSE: 115
BASOPHILS ABSOLUTE: 0 /ΜL
BASOPHILS RELATIVE PERCENT: 0 %
BILIRUB SERPL-MCNC: 0.6 MG/DL (ref 0.1–1.4)
BUN BLDV-MCNC: 21 MG/DL
CALCIUM SERPL-MCNC: 8.8 MG/DL
CHLORIDE BLD-SCNC: 103 MMOL/L
CHOLESTEROL, FASTING: 204
CO2: 29 MMOL/L
CREAT SERPL-MCNC: 0.66 MG/DL
EOSINOPHILS ABSOLUTE: 0 /ΜL
EOSINOPHILS RELATIVE PERCENT: 0 %
GFR CALCULATED: 96
GLUCOSE BLD-MCNC: 94 MG/DL
HBA1C MFR BLD: 5.6 %
HCT VFR BLD CALC: 42.1 % (ref 36–46)
HDLC SERPL-MCNC: 62 MG/DL (ref 35–70)
HEMOGLOBIN: 13.8 G/DL (ref 12–16)
LDL CHOLESTEROL CALCULATED: 127 MG/DL (ref 0–160)
LYMPHOCYTES ABSOLUTE: 0 /ΜL
LYMPHOCYTES RELATIVE PERCENT: 0 %
MCH RBC QN AUTO: 29.5 PG
MCHC RBC AUTO-ENTMCNC: 32.7 G/DL
MCV RBC AUTO: 90 FL
MONOCYTES ABSOLUTE: 0 /ΜL
MONOCYTES RELATIVE PERCENT: 0 %
NEUTROPHILS ABSOLUTE: 0 /ΜL
NEUTROPHILS RELATIVE PERCENT: 0 %
PLATELET # BLD: 316 K/ΜL
PMV BLD AUTO: 8.9 FL
POTASSIUM SERPL-SCNC: 3.7 MMOL/L
RBC # BLD: 4.67 10^6/ΜL
SODIUM BLD-SCNC: 137 MMOL/L
TOTAL PROTEIN: 6.8
TRIGLYCERIDE, FASTING: 73
WBC # BLD: 9.9 10^3/ML

## 2022-12-29 DIAGNOSIS — Z13.1 DIABETES MELLITUS SCREENING: ICD-10-CM

## 2022-12-29 DIAGNOSIS — Z13.220 LIPID SCREENING: ICD-10-CM

## 2022-12-29 DIAGNOSIS — Z00.00 ENCOUNTER FOR ANNUAL PHYSICAL EXAM: ICD-10-CM

## 2022-12-29 DIAGNOSIS — Z11.59 ENCOUNTER FOR HEPATITIS C SCREENING TEST FOR LOW RISK PATIENT: ICD-10-CM

## 2023-01-03 ENCOUNTER — TELEMEDICINE (OUTPATIENT)
Dept: FAMILY MEDICINE CLINIC | Age: 48
End: 2023-01-03
Payer: COMMERCIAL

## 2023-01-03 DIAGNOSIS — Z91.89 AT HIGH RISK FOR BREAST CANCER: ICD-10-CM

## 2023-01-03 DIAGNOSIS — F41.9 ANXIETY: Primary | ICD-10-CM

## 2023-01-03 PROCEDURE — 99214 OFFICE O/P EST MOD 30 MIN: CPT | Performed by: NURSE PRACTITIONER

## 2023-01-03 SDOH — ECONOMIC STABILITY: FOOD INSECURITY: WITHIN THE PAST 12 MONTHS, THE FOOD YOU BOUGHT JUST DIDN'T LAST AND YOU DIDN'T HAVE MONEY TO GET MORE.: NEVER TRUE

## 2023-01-03 SDOH — ECONOMIC STABILITY: FOOD INSECURITY: WITHIN THE PAST 12 MONTHS, YOU WORRIED THAT YOUR FOOD WOULD RUN OUT BEFORE YOU GOT MONEY TO BUY MORE.: NEVER TRUE

## 2023-01-03 ASSESSMENT — PATIENT HEALTH QUESTIONNAIRE - PHQ9
SUM OF ALL RESPONSES TO PHQ QUESTIONS 1-9: 0
SUM OF ALL RESPONSES TO PHQ QUESTIONS 1-9: 0
1. LITTLE INTEREST OR PLEASURE IN DOING THINGS: 0
SUM OF ALL RESPONSES TO PHQ QUESTIONS 1-9: 0
SUM OF ALL RESPONSES TO PHQ9 QUESTIONS 1 & 2: 0
2. FEELING DOWN, DEPRESSED OR HOPELESS: 0
SUM OF ALL RESPONSES TO PHQ QUESTIONS 1-9: 0

## 2023-01-03 ASSESSMENT — SOCIAL DETERMINANTS OF HEALTH (SDOH): HOW HARD IS IT FOR YOU TO PAY FOR THE VERY BASICS LIKE FOOD, HOUSING, MEDICAL CARE, AND HEATING?: NOT HARD AT ALL

## 2023-01-03 NOTE — PROGRESS NOTES
Arun Ponce (:  1975) is a Established patient, here for evaluation of the following:    Assessment & Plan   Below is the assessment and plan developed based on review of pertinent history, physical exam, labs, studies, and medications. 1. Anxiety  2. At high risk for breast cancer  -     MRI BREAST BILATERAL W WO CONTRAST; Future  Return in about 3 months (around 4/3/2023), or if symptoms worsen or fail to improve, for medication follow up. Subjective   Presents to office today for Med check. Xanax works well for her. Her daughter is giving her stress however. She is hopeful this will resolve soon. Reports a good appetite, drinking fluids. Normal bowel and bladder pattern. Sleeping ok. Labs reviewed during visit. Re-enforced importance of healthy diet and exercise. Verbalized understanding. Review of Systems   Constitutional:  Negative for activity change, appetite change, fatigue and fever. HENT:  Negative for congestion, rhinorrhea and sore throat. Eyes:  Negative for visual disturbance. Respiratory:  Negative for cough and shortness of breath. Cardiovascular:  Negative for chest pain and palpitations. Gastrointestinal:  Negative for constipation, diarrhea, nausea, rectal pain and vomiting. Endocrine: Negative for polydipsia, polyphagia and polyuria. Genitourinary:  Negative for dysuria and urgency. Allergic/Immunologic: Negative for immunocompromised state. Neurological:  Negative for syncope, light-headedness and headaches. Psychiatric/Behavioral:  Positive for sleep disturbance. Negative for decreased concentration, dysphoric mood and suicidal ideas. The patient is nervous/anxious. Well controlled with current medication        Objective   Patient-Reported Vitals  No data recorded     Physical Exam  Vitals and nursing note reviewed. Constitutional:       General: She is not in acute distress. Appearance: She is not ill-appearing.    HENT:      Head: Normocephalic. Nose: Nose normal.      Mouth/Throat:      Lips: Pink. Pulmonary:      Effort: Pulmonary effort is normal. No respiratory distress. Neurological:      Mental Status: She is alert and oriented to person, place, and time. Psychiatric:         Attention and Perception: Attention normal.         Speech: Speech normal.         Behavior: Behavior is cooperative. Celestino Harley, was evaluated through a synchronous (real-time) audio-video encounter. The patient (or guardian if applicable) is aware that this is a billable service, which includes applicable co-pays. This Virtual Visit was conducted with patient's (and/or legal guardian's) consent. The visit was conducted pursuant to the emergency declaration under the [facility], [facility] authority and the [employer] General Act. Patient identification was verified, and a caregiver was present when appropriate. The patient was located at Home: [address] [place] [address]. Provider was located at [facility] ([address] [place], [place] [address]t): [address] [facility],  [address] [facility].         --RUSTY Smith NP

## 2023-01-13 ENCOUNTER — TELEPHONE (OUTPATIENT)
Dept: FAMILY MEDICINE CLINIC | Age: 48
End: 2023-01-13

## 2023-01-13 NOTE — TELEPHONE ENCOUNTER
Pt had a DANA on 1/6/23. Pt is aware and has up coming appt for DANA Diagnostic. Pt wants to make sure you are aware of her test results. Writer was able to find them from care everywhere.

## 2023-01-16 ASSESSMENT — ENCOUNTER SYMPTOMS
DIARRHEA: 0
RECTAL PAIN: 0
SORE THROAT: 0
RHINORRHEA: 0
NAUSEA: 0
CONSTIPATION: 0
VOMITING: 0
COUGH: 0
SHORTNESS OF BREATH: 0

## 2023-01-17 DIAGNOSIS — F41.9 ANXIETY: ICD-10-CM

## 2023-01-17 NOTE — TELEPHONE ENCOUNTER
LOV 1/3/23  LRF 12/16/22     No future appt schedule. Health Maintenance   Topic Date Due    Colorectal Cancer Screen  Never done    Cervical cancer screen  12/29/2021    COVID-19 Vaccine (2 - Booster for Lisa series) 01/03/2024 (Originally 2/3/2022)    Depression Screen  01/03/2024    Breast cancer screen  01/06/2024    DTaP/Tdap/Td vaccine (2 - Td or Tdap) 04/09/2024    Lipids  12/27/2027    Flu vaccine  Completed    Pneumococcal 0-64 years Vaccine  Completed    Hepatitis C screen  Completed    Hepatitis A vaccine  Aged Out    Hib vaccine  Aged Out    Meningococcal (ACWY) vaccine  Aged Out    HIV screen  Discontinued             (applicable per patient's age: Cancer Screenings, Depression Screening, Fall Risk Screening, Immunizations)    Hemoglobin A1C (%)   Date Value   12/27/2022 5.6     LDL Cholesterol (mg/dL)   Date Value   03/27/2019 112     LDL Calculated (mg/dL)   Date Value   12/27/2022 127     AST (U/L)   Date Value   12/27/2022 17     ALT (U/L)   Date Value   12/27/2022 13     BUN (mg/dL)   Date Value   12/27/2022 21      (goal A1C is < 7)   (goal LDL is <100) need 30-50% reduction from baseline     BP Readings from Last 3 Encounters:   08/25/22 118/74   01/07/22 117/77   07/16/21 116/82    (goal /80)      All Future Testing planned in [facility]:  Lab Frequency Next Occurrence   DANA DIGITAL SCREEN W OR WO CAD BILATERAL Once 08/25/2022   MRI BREAST BILATERAL W WO CONTRAST Once 01/03/2023       Next Visit Date:  No future appointments.          Patient Active Problem List:     Facet arthritis of lumbar region     Spondylolisthesis at L4-L5 level     Acute right-sided low back pain with right-sided sciatica     Tobacco abuse     Cigarette smoker     Nephrolithiasis

## 2023-01-18 RX ORDER — ALPRAZOLAM 0.5 MG/1
0.5 TABLET ORAL DAILY PRN
Qty: 30 TABLET | Refills: 0 | Status: SHIPPED | OUTPATIENT
Start: 2023-01-18 | End: 2024-01-18

## 2023-01-23 DIAGNOSIS — Z12.31 ENCOUNTER FOR SCREENING MAMMOGRAM FOR MALIGNANT NEOPLASM OF BREAST: ICD-10-CM

## 2023-01-25 DIAGNOSIS — R92.8 ABNORMAL MAMMOGRAM OF LEFT BREAST: Primary | ICD-10-CM

## 2023-02-17 DIAGNOSIS — F41.9 ANXIETY: ICD-10-CM

## 2023-02-17 NOTE — TELEPHONE ENCOUNTER
LOV 1/3/23  LRF 1/18/23     No future appt schedule. Health Maintenance   Topic Date Due    Colorectal Cancer Screen  Never done    Cervical cancer screen  12/29/2021    COVID-19 Vaccine (2 - Booster for Lisa series) 01/03/2024 (Originally 2/3/2022)    Depression Screen  01/03/2024    Breast cancer screen  01/20/2024    DTaP/Tdap/Td vaccine (2 - Td or Tdap) 04/09/2024    Lipids  12/27/2027    Flu vaccine  Completed    Pneumococcal 0-64 years Vaccine  Completed    Hepatitis C screen  Completed    Hepatitis A vaccine  Aged Out    Hib vaccine  Aged Out    Meningococcal (ACWY) vaccine  Aged Out    HIV screen  Discontinued             (applicable per patient's age: Cancer Screenings, Depression Screening, Fall Risk Screening, Immunizations)    Hemoglobin A1C (%)   Date Value   12/27/2022 5.6     LDL Cholesterol (mg/dL)   Date Value   03/27/2019 112     LDL Calculated (mg/dL)   Date Value   12/27/2022 127     AST (U/L)   Date Value   12/27/2022 17     ALT (U/L)   Date Value   12/27/2022 13     BUN (mg/dL)   Date Value   12/27/2022 21      (goal A1C is < 7)   (goal LDL is <100) need 30-50% reduction from baseline     BP Readings from Last 3 Encounters:   08/25/22 118/74   01/07/22 117/77   07/16/21 116/82    (goal /80)      All Future Testing planned in [facility]:  Lab Frequency Next Occurrence   MRI BREAST BILATERAL W WO CONTRAST Once 01/03/2023   US BREAST LIMITED LEFT Once 01/25/2023   DANA DIGITAL DIAGNOSTIC W OR WO CAD LEFT Once 01/25/2023       Next Visit Date:  No future appointments.          Patient Active Problem List:     Facet arthritis of lumbar region     Spondylolisthesis at L4-L5 level     Acute right-sided low back pain with right-sided sciatica     Tobacco abuse     Cigarette smoker     Nephrolithiasis

## 2023-02-21 RX ORDER — ALPRAZOLAM 0.5 MG/1
0.5 TABLET ORAL DAILY PRN
Qty: 30 TABLET | Refills: 0 | Status: SHIPPED | OUTPATIENT
Start: 2023-02-21 | End: 2024-02-21

## 2023-03-02 ENCOUNTER — OFFICE VISIT (OUTPATIENT)
Dept: PRIMARY CARE CLINIC | Age: 48
End: 2023-03-02
Payer: COMMERCIAL

## 2023-03-02 VITALS
DIASTOLIC BLOOD PRESSURE: 72 MMHG | BODY MASS INDEX: 18.88 KG/M2 | TEMPERATURE: 98.6 F | WEIGHT: 117 LBS | HEART RATE: 74 BPM | SYSTOLIC BLOOD PRESSURE: 102 MMHG | OXYGEN SATURATION: 97 %

## 2023-03-02 DIAGNOSIS — J06.9 VIRAL URI WITH COUGH: Primary | ICD-10-CM

## 2023-03-02 PROCEDURE — 99213 OFFICE O/P EST LOW 20 MIN: CPT | Performed by: PHYSICIAN ASSISTANT

## 2023-03-02 RX ORDER — PREDNISONE 20 MG/1
20 TABLET ORAL 2 TIMES DAILY
Qty: 10 TABLET | Refills: 0 | Status: SHIPPED | OUTPATIENT
Start: 2023-03-02 | End: 2023-03-07

## 2023-03-02 RX ORDER — BENZONATATE 200 MG/1
200 CAPSULE ORAL 3 TIMES DAILY PRN
Qty: 21 CAPSULE | Refills: 0 | Status: SHIPPED | OUTPATIENT
Start: 2023-03-02 | End: 2023-03-09

## 2023-03-02 RX ORDER — BROMPHENIRAMINE MALEATE, PSEUDOEPHEDRINE HYDROCHLORIDE, AND DEXTROMETHORPHAN HYDROBROMIDE 2; 30; 10 MG/5ML; MG/5ML; MG/5ML
SYRUP ORAL
Qty: 180 ML | Refills: 0 | Status: SHIPPED | OUTPATIENT
Start: 2023-03-02

## 2023-03-02 ASSESSMENT — ENCOUNTER SYMPTOMS
SINUS PRESSURE: 0
WHEEZING: 1
SORE THROAT: 1
SHORTNESS OF BREATH: 0
COUGH: 1
GASTROINTESTINAL NEGATIVE: 1
RHINORRHEA: 1
SINUS PAIN: 0

## 2023-03-02 NOTE — PROGRESS NOTES
[address]  [address]  [address] [address] [facility], [place]  Phone: [phone]  Fax: Lawrence Nunez    Pt Name: Curt Hoang  MRN: [id number]  Jeffreygfjamin 1975  Date of evaluation: 3/2/2023  Provider: Zara Kenyon PA-C     CHIEF COMPLAINT       Chief Complaint   Patient presents with    Cough     Cough and congestion started yesterday. No fever. Nasal Congestion           HISTORY OF PRESENT ILLNESS  (Location/Symptom, Timing/Onset, Context/Setting, Quality, Duration, Modifying Factors, Severity.)   Curt Hoang is a 51 y.o. White (non-) [1] female who presents to the office for evaluation of      Cough  This is a new problem. The current episode started yesterday. The cough is Productive of sputum. Associated symptoms include chills, nasal congestion, postnasal drip, rhinorrhea, a sore throat and wheezing. Pertinent negatives include no ear congestion, ear pain, fever, myalgias or shortness of breath. The symptoms are aggravated by lying down. She has tried OTC cough suppressant for the symptoms. Her past medical history is significant for asthma. Nursing Notes were reviewed. REVIEW OF SYSTEMS    (2-9 systems for level 4, 10 or more for level 5)     Review of Systems   Constitutional:  Positive for chills. Negative for fever. HENT:  Positive for congestion, postnasal drip, rhinorrhea and sore throat. Negative for ear pain, sinus pressure, sinus pain and sneezing. Respiratory:  Positive for cough and wheezing. Negative for shortness of breath. Cardiovascular: Negative. Gastrointestinal: Negative. Genitourinary: Negative. Musculoskeletal: Negative. Negative for myalgias. Except as noted above the remainder of the review of systems was reviewed andnegative. PAST MEDICAL HISTORY   History reviewed. Past Medical History:   Diagnosis Date    Anxiety     Depression     Lumbago          SURGICAL HISTORY     History reviewed. Past Surgical History:   Procedure Laterality Date    TUBAL LIGATION           CURRENT MEDICATIONS       Current Outpatient Medications   Medication Sig Dispense Refill    benzonatate (TESSALON) 200 MG capsule Take 1 capsule by mouth 3 times daily as needed for Cough 21 capsule 0    brompheniramine-pseudoephedrine-DM 2-30-10 MG/5ML syrup 1 - 2 TSP Q 4-6HRS PRN FOR COUGH 180 mL 0    predniSONE (DELTASONE) 20 MG tablet Take 1 tablet by mouth 2 times daily for 5 days 10 tablet 0    ALPRAZolam (XANAX) 0.5 MG tablet Take 1 tablet by mouth daily as needed for Sleep or Anxiety. 30 tablet 0    albuterol (PROVENTIL) (2.5 MG/3ML) 0.083% nebulizer solution Take 3 mLs by nebulization every 6 hours as needed for Wheezing or Shortness of Breath 120 each 1    cyanocobalamin 1000 MCG tablet Take 1 tablet by mouth daily 30 tablet 5    Cholecalciferol (VITAMIN D3) 50 MCG (2000 UT) CAPS Take 1 capsule by mouth daily 30 capsule 5     No current facility-administered medications for this visit. ALLERGIES     Patient has no known allergies. FAMILY HISTORY           Problem Relation Age of Onset    Breast Cancer Mother 52    Breast Cancer Sister 43    Breast Cancer Maternal Grandmother 52    No Known Problems Maternal Grandfather     Heart Disease Paternal Grandmother     Lung Cancer Paternal Grandfather      Family Status   Relation Name Status    Mother Desi Hudson     Father      Sister Radha Downing     [address]     MGF      [address]     PGF            SOCIAL HISTORY      reports that she has been smoking cigarettes. She started smoking about 28 years ago. She has a 11.00 pack-year smoking history. She has never used smokeless tobacco. She reports that she does not drink alcohol and does not use drugs.       PHYSICAL EXAM    (up to 7 for level 4, 8 or more for level 5)     Vitals:    23 1215   BP: 102/72   Site: Left Upper Arm   Position: Sitting   Cuff Size: Medium Adult   Pulse: 74   Temp: 98.6 °F (37 °C)   SpO2: 97%   Weight: 117 lb (53.1 kg)         Physical Exam  Vitals and nursing note reviewed. Constitutional:       General: She is not in acute distress. Appearance: Normal appearance. She is not ill-appearing, toxic-appearing or diaphoretic. HENT:      Head: Normocephalic and atraumatic. Right Ear: External ear normal.      Left Ear: External ear normal.      Nose: Congestion present. Mouth/Throat:      Mouth: Mucous membranes are moist.   Eyes:      Extraocular Movements: Extraocular movements intact. Conjunctiva/sclera: Conjunctivae normal.      Pupils: Pupils are equal, round, and reactive to light. Pulmonary:      Effort: Pulmonary effort is normal.      Breath sounds: Normal breath sounds. Skin:     General: Skin is warm and dry. Neurological:      Mental Status: She is alert and oriented to person, place, and time. DIFFERENTIAL DIAGNOSIS:       Adam Jones reviewed the disposition diagnosis with the patient and or their family/guardian. I have answered their questions and given discharge instructions. They voiced understanding of these instructions and did not have anyfurther questions or complaints. PROCEDURES:  No orders of the defined types were placed in this encounter. No results found for this visit on 03/02/23. FINALIMPRESSION      Visit Diagnoses and Associated Orders       Viral URI with cough    -  Primary         ORDERS WITHOUT AN ASSOCIATED DIAGNOSIS    benzonatate (TESSALON) 200 MG capsule [32032]      brompheniramine-pseudoephedrine-DM 2-30-10 MG/5ML syrup [81109]      predniSONE (DELTASONE) 20 MG tablet [[id number]]                PLAN     No follow-ups on file.       DISCHARGEMEDICATIONS:  Orders Placed This Encounter   Medications    benzonatate (TESSALON) 200 MG capsule     Sig: Take 1 capsule by mouth 3 times daily as needed for Cough     Dispense:  21 capsule     Refill:  0 brompheniramine-pseudoephedrine-DM 2-30-10 MG/5ML syrup     Si - 2 TSP Q 4-6HRS PRN FOR COUGH     Dispense:  180 mL     Refill:  0    predniSONE (DELTASONE) 20 MG tablet     Sig: Take 1 tablet by mouth 2 times daily for 5 days     Dispense:  10 tablet     Refill:  0           Plan:  I believe that this is likely a viral illness based on the physical exam findings. Take medication as prescribed   Tylenol/Motrin for fever/discomfort. Patient agreeable to treatment plan. Educational materials provided on AVS.  Follow up if symptoms do not improve/worsen. Patient instructed to return to the office if symptoms worsen, return, or have any other concerns. Patient understands and is agreeable.          Maribell Dwyer PA-C 3/2/2023 12:32 PM

## 2023-03-07 ENCOUNTER — TELEPHONE (OUTPATIENT)
Dept: FAMILY MEDICINE CLINIC | Age: 48
End: 2023-03-07

## 2023-03-07 NOTE — TELEPHONE ENCOUNTER
Patient contacted the office today because she has finished the steroid and is still coughing and is coughing up green mucous. Patient states that she said that she is taking the cough medication that does help. Edward asking if an ATB would help?      Gisel Saha

## 2023-03-09 NOTE — TELEPHONE ENCOUNTER
Patient contacted the office today to check on the request for more recommendations. Patient states that she is not sleeping at night now from the cough. Patient states that the treatments from 3/2/23 is now gone and she is still not better. Please advise.

## 2023-03-10 RX ORDER — PREDNISONE 20 MG/1
20 TABLET ORAL 2 TIMES DAILY
Qty: 10 TABLET | Refills: 0 | Status: SHIPPED | OUTPATIENT
Start: 2023-03-10 | End: 2023-03-15

## 2023-03-10 RX ORDER — BENZONATATE 200 MG/1
200 CAPSULE ORAL 3 TIMES DAILY PRN
Qty: 21 CAPSULE | Refills: 0 | Status: SHIPPED | OUTPATIENT
Start: 2023-03-10 | End: 2023-03-17

## 2023-03-10 RX ORDER — AZITHROMYCIN 250 MG/1
250 TABLET, FILM COATED ORAL SEE ADMIN INSTRUCTIONS
Qty: 6 TABLET | Refills: 0 | Status: SHIPPED | OUTPATIENT
Start: 2023-03-10 | End: 2023-03-15

## 2023-03-20 DIAGNOSIS — F41.9 ANXIETY: ICD-10-CM

## 2023-03-20 NOTE — TELEPHONE ENCOUNTER
LOV 1-3-23  LRF Xanax 2-21-23, Vitamin D 3-18-22    Health Maintenance   Topic Date Due    Colorectal Cancer Screen  Never done    Cervical cancer screen  12/29/2021    COVID-19 Vaccine (2 - Booster for Lisa series) 01/03/2024 (Originally 2/3/2022)    Depression Screen  01/03/2024    Breast cancer screen  01/20/2024    DTaP/Tdap/Td vaccine (2 - Td or Tdap) 04/09/2024    Lipids  12/27/2027    Flu vaccine  Completed    Pneumococcal 0-64 years Vaccine  Completed    Hepatitis C screen  Completed    Hepatitis A vaccine  Aged Out    Hib vaccine  Aged Out    Meningococcal (ACWY) vaccine  Aged Out    HIV screen  Discontinued             (applicable per patient's age: Cancer Screenings, Depression Screening, Fall Risk Screening, Immunizations)    Hemoglobin A1C (%)   Date Value   12/27/2022 5.6     LDL Cholesterol (mg/dL)   Date Value   03/27/2019 112     LDL Calculated (mg/dL)   Date Value   12/27/2022 127     AST (U/L)   Date Value   12/27/2022 17     ALT (U/L)   Date Value   12/27/2022 13     BUN (mg/dL)   Date Value   12/27/2022 21      (goal A1C is < 7)   (goal LDL is <100) need 30-50% reduction from baseline     BP Readings from Last 3 Encounters:   03/02/23 102/72   08/25/22 118/74   01/07/22 117/77    (goal /80)      All Future Testing planned in [facility]:  Lab Frequency Next Occurrence   MRI BREAST BILATERAL W WO CONTRAST Once 01/03/2023   US BREAST LIMITED LEFT Once 01/25/2023   DANA DIGITAL DIAGNOSTIC W OR WO CAD LEFT Once 01/25/2023       Next Visit Date:  Future Appointments   Date Time Provider Yolanda Jordan   3/31/2023  7:45 AM RUSTY Barrera NP            Patient Active Problem List:     Facet arthritis of lumbar region     Spondylolisthesis at L4-L5 level     Acute right-sided low back pain with right-sided sciatica     Tobacco abuse     Cigarette smoker     Nephrolithiasis

## 2023-03-21 RX ORDER — ALPRAZOLAM 0.5 MG/1
0.5 TABLET ORAL DAILY PRN
Qty: 30 TABLET | Refills: 0 | Status: SHIPPED | OUTPATIENT
Start: 2023-03-21 | End: 2024-03-20

## 2023-03-21 RX ORDER — ACETAMINOPHEN 160 MG
1 TABLET,DISINTEGRATING ORAL DAILY
Qty: 30 CAPSULE | Refills: 5 | Status: SHIPPED | OUTPATIENT
Start: 2023-03-21 | End: 2024-03-20

## 2023-03-31 ENCOUNTER — HOSPITAL ENCOUNTER (OUTPATIENT)
Dept: CT IMAGING | Age: 48
Discharge: HOME | End: 2023-03-31
Payer: COMMERCIAL

## 2023-03-31 ENCOUNTER — OFFICE VISIT (OUTPATIENT)
Dept: FAMILY MEDICINE CLINIC | Age: 48
End: 2023-03-31

## 2023-03-31 VITALS
OXYGEN SATURATION: 97 % | HEART RATE: 84 BPM | SYSTOLIC BLOOD PRESSURE: 104 MMHG | WEIGHT: 120 LBS | BODY MASS INDEX: 19.99 KG/M2 | TEMPERATURE: 97.9 F | DIASTOLIC BLOOD PRESSURE: 70 MMHG | HEIGHT: 65 IN

## 2023-03-31 DIAGNOSIS — J44.9 CHRONIC OBSTRUCTIVE PULMONARY DISEASE, UNSPECIFIED COPD TYPE (HCC): ICD-10-CM

## 2023-03-31 DIAGNOSIS — R05.1 ACUTE COUGH: ICD-10-CM

## 2023-03-31 DIAGNOSIS — R05.1 ACUTE COUGH: Primary | ICD-10-CM

## 2023-03-31 DIAGNOSIS — R06.2 WHEEZING: ICD-10-CM

## 2023-03-31 DIAGNOSIS — F17.210 CIGARETTE SMOKER: ICD-10-CM

## 2023-03-31 DIAGNOSIS — Z72.0 TOBACCO ABUSE: ICD-10-CM

## 2023-03-31 DIAGNOSIS — K92.1 BLOOD IN STOOL: ICD-10-CM

## 2023-03-31 PROCEDURE — 71260 CT THORAX DX C+: CPT

## 2023-03-31 PROCEDURE — 6360000004 HC RX CONTRAST MEDICATION: Performed by: NURSE PRACTITIONER

## 2023-03-31 PROCEDURE — 2580000003 HC RX 258: Performed by: NURSE PRACTITIONER

## 2023-03-31 RX ORDER — BUDESONIDE AND FORMOTEROL FUMARATE DIHYDRATE 160; 4.5 UG/1; UG/1
2 AEROSOL RESPIRATORY (INHALATION) 2 TIMES DAILY
Qty: 10.2 G | Refills: 3 | Status: SHIPPED | OUTPATIENT
Start: 2023-03-31

## 2023-03-31 RX ORDER — METHYLPREDNISOLONE 4 MG/1
TABLET ORAL
Qty: 1 KIT | Refills: 0 | Status: SHIPPED | OUTPATIENT
Start: 2023-03-31 | End: 2023-04-06

## 2023-03-31 RX ORDER — SODIUM CHLORIDE 0.9 % (FLUSH) 0.9 %
10 SYRINGE (ML) INJECTION PRN
Status: DISCONTINUED | OUTPATIENT
Start: 2023-03-31 | End: 2023-04-03 | Stop reason: HOSPADM

## 2023-03-31 RX ORDER — 0.9 % SODIUM CHLORIDE 0.9 %
80 INTRAVENOUS SOLUTION INTRAVENOUS ONCE
Status: COMPLETED | OUTPATIENT
Start: 2023-03-31 | End: 2023-03-31

## 2023-03-31 RX ADMIN — IOPAMIDOL 75 ML: 755 INJECTION, SOLUTION INTRAVENOUS at 10:18

## 2023-03-31 RX ADMIN — SODIUM CHLORIDE 80 ML: 9 INJECTION, SOLUTION INTRAVENOUS at 10:18

## 2023-03-31 RX ADMIN — SODIUM CHLORIDE, PRESERVATIVE FREE 10 ML: 5 INJECTION INTRAVENOUS at 10:18

## 2023-03-31 SDOH — ECONOMIC STABILITY: FOOD INSECURITY: WITHIN THE PAST 12 MONTHS, THE FOOD YOU BOUGHT JUST DIDN'T LAST AND YOU DIDN'T HAVE MONEY TO GET MORE.: NEVER TRUE

## 2023-03-31 SDOH — ECONOMIC STABILITY: HOUSING INSECURITY
IN THE LAST 12 MONTHS, WAS THERE A TIME WHEN YOU DID NOT HAVE A STEADY PLACE TO SLEEP OR SLEPT IN A SHELTER (INCLUDING NOW)?: NO

## 2023-03-31 SDOH — ECONOMIC STABILITY: FOOD INSECURITY: WITHIN THE PAST 12 MONTHS, YOU WORRIED THAT YOUR FOOD WOULD RUN OUT BEFORE YOU GOT MONEY TO BUY MORE.: NEVER TRUE

## 2023-03-31 SDOH — ECONOMIC STABILITY: INCOME INSECURITY: HOW HARD IS IT FOR YOU TO PAY FOR THE VERY BASICS LIKE FOOD, HOUSING, MEDICAL CARE, AND HEATING?: NOT HARD AT ALL

## 2023-03-31 ASSESSMENT — PATIENT HEALTH QUESTIONNAIRE - PHQ9
SUM OF ALL RESPONSES TO PHQ QUESTIONS 1-9: 0
2. FEELING DOWN, DEPRESSED OR HOPELESS: 0
1. LITTLE INTEREST OR PLEASURE IN DOING THINGS: 0
SUM OF ALL RESPONSES TO PHQ9 QUESTIONS 1 & 2: 0
SUM OF ALL RESPONSES TO PHQ QUESTIONS 1-9: 0

## 2023-03-31 ASSESSMENT — ANXIETY QUESTIONNAIRES
GAD7 TOTAL SCORE: 2
2. NOT BEING ABLE TO STOP OR CONTROL WORRYING: 0
3. WORRYING TOO MUCH ABOUT DIFFERENT THINGS: 0
7. FEELING AFRAID AS IF SOMETHING AWFUL MIGHT HAPPEN: 0
IF YOU CHECKED OFF ANY PROBLEMS ON THIS QUESTIONNAIRE, HOW DIFFICULT HAVE THESE PROBLEMS MADE IT FOR YOU TO DO YOUR WORK, TAKE CARE OF THINGS AT HOME, OR GET ALONG WITH OTHER PEOPLE: NOT DIFFICULT AT ALL
4. TROUBLE RELAXING: 1
6. BECOMING EASILY ANNOYED OR IRRITABLE: 0
1. FEELING NERVOUS, ANXIOUS, OR ON EDGE: 1
5. BEING SO RESTLESS THAT IT IS HARD TO SIT STILL: 0

## 2023-03-31 NOTE — PROGRESS NOTES
polydipsia, polyphagia and polyuria. Genitourinary:  Negative for dysuria and urgency. Allergic/Immunologic: Negative for immunocompromised state. Neurological:  Negative for syncope, light-headedness and headaches. Psychiatric/Behavioral:  Positive for sleep disturbance. Negative for decreased concentration, dysphoric mood and suicidal ideas. The patient is nervous/anxious. Well controlled with current medication        Objective   Physical Exam  Vitals and nursing note reviewed. Constitutional:       General: She is not in acute distress. Appearance: She is not ill-appearing. HENT:      Head: Normocephalic. Nose: Nose normal.      Mouth/Throat:      Lips: Pink. Pulmonary:      Effort: Pulmonary effort is normal. No respiratory distress. Breath sounds: Examination of the right-lower field reveals wheezing. Examination of the left-lower field reveals wheezing. Decreased breath sounds and wheezing present. Neurological:      Mental Status: She is alert and oriented to person, place, and time. Psychiatric:         Attention and Perception: Attention normal.         Speech: Speech normal.         Behavior: Behavior is cooperative. An electronic signature was used to authenticate this note.     --RUSTY Jarrett NP

## 2023-04-02 ASSESSMENT — ENCOUNTER SYMPTOMS
CHEST TIGHTNESS: 1
NAUSEA: 0
RECTAL PAIN: 0
RHINORRHEA: 0
CONSTIPATION: 0
VOMITING: 0
SHORTNESS OF BREATH: 0
SORE THROAT: 0
COUGH: 1
WHEEZING: 1
DIARRHEA: 0
BLOOD IN STOOL: 1

## 2023-04-21 DIAGNOSIS — F41.9 ANXIETY: ICD-10-CM

## 2023-04-21 RX ORDER — ALPRAZOLAM 0.5 MG/1
0.5 TABLET ORAL DAILY PRN
Qty: 30 TABLET | Refills: 0 | Status: SHIPPED | OUTPATIENT
Start: 2023-04-21 | End: 2024-04-20

## 2023-04-21 NOTE — TELEPHONE ENCOUNTER
LOV 3/31/23  LRF 3/21/23    Next appt 6/30/23      Health Maintenance   Topic Date Due    Colorectal Cancer Screen  Never done    Cervical cancer screen  12/29/2021    COVID-19 Vaccine (2 - Booster for Lisa series) 01/03/2024 (Originally 2/3/2022)    Breast cancer screen  01/20/2024    Depression Screen  03/31/2024    DTaP/Tdap/Td vaccine (2 - Td or Tdap) 04/09/2024    Lipids  12/27/2027    Flu vaccine  Completed    Pneumococcal 0-64 years Vaccine  Completed    Hepatitis C screen  Completed    Hepatitis A vaccine  Aged Out    Hib vaccine  Aged Out    Meningococcal (ACWY) vaccine  Aged Out    HIV screen  Discontinued             (applicable per patient's age: Cancer Screenings, Depression Screening, Fall Risk Screening, Immunizations)    Hemoglobin A1C (%)   Date Value   12/27/2022 5.6     LDL Cholesterol (mg/dL)   Date Value   03/27/2019 112     LDL Calculated (mg/dL)   Date Value   12/27/2022 127     AST (U/L)   Date Value   12/27/2022 17     ALT (U/L)   Date Value   12/27/2022 13     BUN (mg/dL)   Date Value   12/27/2022 21      (goal A1C is < 7)   (goal LDL is <100) need 30-50% reduction from baseline     BP Readings from Last 3 Encounters:   03/31/23 104/70   03/02/23 102/72   08/25/22 118/74    (goal /80)      All Future Testing planned in [facility]:  Lab Frequency Next Occurrence   MRI BREAST BILATERAL W WO CONTRAST Once 01/03/2023   US BREAST LIMITED LEFT Once 01/25/2023   DANA DIGITAL DIAGNOSTIC W OR WO CAD LEFT Once 01/25/2023       Next Visit Date:  Future Appointments   Date Time Provider Yolanda Jordan   6/30/2023  7:45 AM RUSTY Snyder - LUIS Molina            Patient Active Problem List:     Facet arthritis of lumbar region     Spondylolisthesis at L4-L5 level     Acute right-sided low back pain with right-sided sciatica     Tobacco abuse     Cigarette smoker     Nephrolithiasis

## 2023-05-19 DIAGNOSIS — F41.9 ANXIETY: ICD-10-CM

## 2023-05-19 RX ORDER — ALPRAZOLAM 0.5 MG/1
0.5 TABLET ORAL DAILY PRN
Qty: 30 TABLET | Refills: 0 | Status: SHIPPED | OUTPATIENT
Start: 2023-05-19 | End: 2024-05-18

## 2023-06-19 DIAGNOSIS — F41.9 ANXIETY: ICD-10-CM

## 2023-06-20 RX ORDER — ALPRAZOLAM 0.5 MG/1
0.5 TABLET ORAL DAILY PRN
Qty: 30 TABLET | Refills: 0 | Status: SHIPPED | OUTPATIENT
Start: 2023-06-20 | End: 2024-06-19

## 2023-06-28 ENCOUNTER — HOSPITAL ENCOUNTER (OUTPATIENT)
Dept: GENERAL RADIOLOGY | Age: 48
Discharge: HOME | End: 2023-06-30
Payer: COMMERCIAL

## 2023-06-28 ENCOUNTER — OFFICE VISIT (OUTPATIENT)
Dept: FAMILY MEDICINE CLINIC | Age: 48
End: 2023-06-28
Payer: COMMERCIAL

## 2023-06-28 ENCOUNTER — HOSPITAL ENCOUNTER (OUTPATIENT)
Age: 48
Discharge: HOME | End: 2023-06-30
Payer: COMMERCIAL

## 2023-06-28 VITALS
OXYGEN SATURATION: 98 % | WEIGHT: 116 LBS | SYSTOLIC BLOOD PRESSURE: 102 MMHG | HEIGHT: 65 IN | TEMPERATURE: 97 F | HEART RATE: 76 BPM | BODY MASS INDEX: 19.33 KG/M2 | DIASTOLIC BLOOD PRESSURE: 70 MMHG

## 2023-06-28 DIAGNOSIS — M47.816 FACET ARTHRITIS OF LUMBAR REGION: ICD-10-CM

## 2023-06-28 DIAGNOSIS — R20.0 NUMBNESS AND TINGLING: ICD-10-CM

## 2023-06-28 DIAGNOSIS — Z87.39 HISTORY OF SCOLIOSIS: ICD-10-CM

## 2023-06-28 DIAGNOSIS — M43.16 SPONDYLOLISTHESIS AT L4-L5 LEVEL: ICD-10-CM

## 2023-06-28 DIAGNOSIS — R20.2 NUMBNESS AND TINGLING: ICD-10-CM

## 2023-06-28 DIAGNOSIS — N63.21 MASS OF UPPER OUTER QUADRANT OF LEFT BREAST: Primary | ICD-10-CM

## 2023-06-28 DIAGNOSIS — M54.41 ACUTE RIGHT-SIDED LOW BACK PAIN WITH RIGHT-SIDED SCIATICA: ICD-10-CM

## 2023-06-28 DIAGNOSIS — Z91.89 AT HIGH RISK FOR BREAST CANCER: ICD-10-CM

## 2023-06-28 PROCEDURE — 99214 OFFICE O/P EST MOD 30 MIN: CPT | Performed by: NURSE PRACTITIONER

## 2023-06-28 PROCEDURE — 72052 X-RAY EXAM NECK SPINE 6/>VWS: CPT

## 2023-06-28 PROCEDURE — 72100 X-RAY EXAM L-S SPINE 2/3 VWS: CPT

## 2023-06-28 ASSESSMENT — ANXIETY QUESTIONNAIRES
6. BECOMING EASILY ANNOYED OR IRRITABLE: 0
7. FEELING AFRAID AS IF SOMETHING AWFUL MIGHT HAPPEN: 0
5. BEING SO RESTLESS THAT IT IS HARD TO SIT STILL: 0
GAD7 TOTAL SCORE: 1
4. TROUBLE RELAXING: 0
IF YOU CHECKED OFF ANY PROBLEMS ON THIS QUESTIONNAIRE, HOW DIFFICULT HAVE THESE PROBLEMS MADE IT FOR YOU TO DO YOUR WORK, TAKE CARE OF THINGS AT HOME, OR GET ALONG WITH OTHER PEOPLE: NOT DIFFICULT AT ALL
3. WORRYING TOO MUCH ABOUT DIFFERENT THINGS: 1
1. FEELING NERVOUS, ANXIOUS, OR ON EDGE: 0
2. NOT BEING ABLE TO STOP OR CONTROL WORRYING: 0

## 2023-06-28 ASSESSMENT — PATIENT HEALTH QUESTIONNAIRE - PHQ9
SUM OF ALL RESPONSES TO PHQ9 QUESTIONS 1 & 2: 0
SUM OF ALL RESPONSES TO PHQ QUESTIONS 1-9: 0
SUM OF ALL RESPONSES TO PHQ QUESTIONS 1-9: 0
2. FEELING DOWN, DEPRESSED OR HOPELESS: 0
1. LITTLE INTEREST OR PLEASURE IN DOING THINGS: 0
SUM OF ALL RESPONSES TO PHQ QUESTIONS 1-9: 0
SUM OF ALL RESPONSES TO PHQ QUESTIONS 1-9: 0

## 2023-06-29 ASSESSMENT — ENCOUNTER SYMPTOMS
NAUSEA: 0
BACK PAIN: 1
SHORTNESS OF BREATH: 0
COUGH: 0
SORE THROAT: 0
RHINORRHEA: 0
VOMITING: 0
CONSTIPATION: 0
DIARRHEA: 0

## 2023-06-30 ENCOUNTER — HOSPITAL ENCOUNTER (OUTPATIENT)
Age: 48
Setting detail: THERAPIES SERIES
Discharge: HOME | End: 2023-06-30
Payer: COMMERCIAL

## 2023-06-30 PROCEDURE — 97110 THERAPEUTIC EXERCISES: CPT

## 2023-06-30 PROCEDURE — 97161 PT EVAL LOW COMPLEX 20 MIN: CPT

## 2023-07-05 ENCOUNTER — HOSPITAL ENCOUNTER (OUTPATIENT)
Age: 48
Setting detail: THERAPIES SERIES
Discharge: HOME | End: 2023-07-05
Payer: COMMERCIAL

## 2023-07-05 PROCEDURE — 97140 MANUAL THERAPY 1/> REGIONS: CPT

## 2023-07-05 PROCEDURE — 97110 THERAPEUTIC EXERCISES: CPT

## 2023-07-05 NOTE — FLOWSHEET NOTE
Education:  [x] Verbalizes understanding. [x] Demonstrates understanding. [] Needs review. [] Demonstrates/verbalizes HEP/Ed previously given. Access Code: [email or web address]  URL: [employer]/  Date: 07/05/2023  Prepared by: Aayush Duarte    Exercises  - Prone Gluteal Sets  - 1 x daily - 7 x weekly - 1 sets - 20 reps - 10 seconds hold  - Prone Transversus Abdominus Contraction  - 1 x daily - 7 x weekly - 1 sets - 10 reps - 10 seconds hold  - Prone Hip Extension  - 1 x daily - 7 x weekly - 1 sets - 20 reps  - Prone Alternating Arm and Leg Lifts  - 1 x daily - 7 x weekly - 3 sets - 20 reps  - Supine Bridge  - 1 x daily - 7 x weekly - 1 sets - 20 reps  Plan: [x] Continue current frequency toward long and short term goals. [x] Specific Instructions for subsequent treatments: progress prone extension as tolerated for ROM and strengthening      Time In:1020            Time Out: 1100    Electronically signed by:   Aayush Duarte, PT

## 2023-07-07 ENCOUNTER — HOSPITAL ENCOUNTER (OUTPATIENT)
Age: 48
Setting detail: THERAPIES SERIES
Discharge: HOME | End: 2023-07-07
Payer: COMMERCIAL

## 2023-07-07 PROCEDURE — 97110 THERAPEUTIC EXERCISES: CPT

## 2023-07-07 PROCEDURE — 97140 MANUAL THERAPY 1/> REGIONS: CPT

## 2023-07-07 NOTE — FLOWSHEET NOTE
able to exercise  Pt. Education:  [x] Yes  [] No  [x] Reviewed Prior HEP/Ed  Method of Education: [x] Verbal  [x] Demo  [] Written  Comprehension of Education:  [x] Verbalizes understanding. [x] Demonstrates understanding. [] Needs review. [] Demonstrates/verbalizes HEP/Ed previously given. Access Code: [email or web address]  URL: [employer]/  Date: 07/05/2023  Prepared by: Lul Woodson    Exercises  - Prone Gluteal Sets  - 1 x daily - 7 x weekly - 1 sets - 20 reps - 10 seconds hold  - Prone Transversus Abdominus Contraction  - 1 x daily - 7 x weekly - 1 sets - 10 reps - 10 seconds hold  - Prone Hip Extension  - 1 x daily - 7 x weekly - 1 sets - 20 reps  - Prone Alternating Arm and Leg Lifts  - 1 x daily - 7 x weekly - 3 sets - 20 reps  - Supine Bridge  - 1 x daily - 7 x weekly - 1 sets - 20 reps  Plan: [x] Continue current frequency toward long and short term goals. [x] Specific Instructions for subsequent treatments: progress prone extension as tolerated for ROM and strengthening      Time In:0930           Time Out: 3559    Electronically signed by:   Lul Woodson, PT

## 2023-07-10 ENCOUNTER — HOSPITAL ENCOUNTER (OUTPATIENT)
Age: 48
Setting detail: THERAPIES SERIES
Discharge: HOME | End: 2023-07-10
Payer: COMMERCIAL

## 2023-07-10 NOTE — FLOWSHEET NOTE
[] [address]  [address] [facility].    [phone]  F: [phone]   [] [address]  [address]   [address]  P: [phone]  F: [phone]  [] [address]  [address] [facility]  P: [phone]  F: [phone] [] Lawrence Jovel: [phone]  F: [phone]  [] [address] [facility]   [address]   [place]: [phone]  F: [phone]   [] [address] [facility]  [address] [facility] [address]  [place]: [phone]   F: [phone]     Physical Therapy Cancel/No Show note    Date: 7/10/2023  Patient: Kasey Rhodes  : 1975  MRN: [id number]    Cancels/No Shows to date:     For today's appointment patient:    [x]  Cancelled    [] Rescheduled appointment    [] No-show     Reason given by patient:    [x]  Patient ill    []  Conflicting appointment    [] No transportation      [] Conflict with work    [] No reason given    [] Weather related    [] COVID-19    [] Other:      Comments:        [x] Next appointment was confirmed    Electronically signed by:  Ángel Carter, PT

## 2023-07-13 ENCOUNTER — HOSPITAL ENCOUNTER (OUTPATIENT)
Age: 48
Setting detail: THERAPIES SERIES
Discharge: HOME | End: 2023-07-13
Payer: COMMERCIAL

## 2023-07-13 PROCEDURE — 97110 THERAPEUTIC EXERCISES: CPT

## 2023-07-13 PROCEDURE — 97140 MANUAL THERAPY 1/> REGIONS: CPT

## 2023-07-13 NOTE — FLOWSHEET NOTE
[] [address]  [address] [facility].  [phone]  F: [phone] [] [address] [facility]  [address]   [address]  P: [phone]  F: [phone] [] [address]  [address] [facility]  P: [phone]  F: [phone] [] [place]: [phone]  F: [phone] [] [address] [facility]  [address] [facility]   [address]   P: [phone]  F: [phone]  [x] [address] [facility].   P: [phone]  F: [phone] [] [address] [facility]  [address]    [address]  P: [phone]  F: [phone] [] [address] [facility]  [address] [facility]  [place]: [phone]  F: [phone] [] [address] [place] [facility] [address]  [place]: [phone]  F: [phone]      Physical Therapy Daily Treatment Note    Date:  2023  Patient Name:  Joey Silver    :  1975  MRN: [id number]  Physician: Kaushal OJEDA NP                              Insurance: [facility] PPO 70/year  Medical Diagnosis: M43.16 spondylolisthesis L4-5, M47.816 Lumbar facet arthritis, M54.41 Acute R LBP with R sciatica, R20.2 numbness and tingling, Z87.39 hx scoliosis              Rehab Codes: M43.16, M47.816, M54.41, R20.2  Onset Date: 2023                      Next 's appt.: TBD   visit#    Cancels/No Shows: 0/0    Subjective:    Pain:  [x] Yes  [] No Location: LBP  Pain Rating: (0-10 scale) 2/10  Pain altered Tx:  [] No  [x] Yes  Action: improved with prone extension protocol  Comments:    Jane Pretty

## 2023-07-19 ENCOUNTER — HOSPITAL ENCOUNTER (OUTPATIENT)
Age: 48
Setting detail: THERAPIES SERIES
Discharge: HOME | End: 2023-07-19
Payer: COMMERCIAL

## 2023-07-19 PROCEDURE — 97140 MANUAL THERAPY 1/> REGIONS: CPT

## 2023-07-19 PROCEDURE — 97110 THERAPEUTIC EXERCISES: CPT

## 2023-07-19 NOTE — FLOWSHEET NOTE
[] [address]  [address] [facility].  [phone]  F: [phone] [] [address] [facility]  [address]   [address]  P: [phone]  F: [phone] [] [address]  [address] [facility]  P: [phone]  F: [phone] [] [place]: [phone]  F: [phone] [] [address] [facility]  [address] [facility]   [address]   P: [phone]  F: [phone]  [x] [address] [facility].   P: [phone]  F: [phone] [] [address] [facility]  [address] [place]    [address]  P: [phone]  F: [phone] [] [address] [facility]  [address] [facility]  [place]: [phone]  F: [phone] [] [address] [facility] [address]  [place]: [phone]  F: [phone]      Physical Therapy Daily Treatment Note    Date:  2023  Patient Name:  Gibran Reyes    :  1975  MRN: [id number]  Physician: Cehikh OJEDA NP                              Insurance: [facility] PP 70/year  Medical Diagnosis: M43.16 spondylolisthesis L4-5, M47.816 Lumbar facet arthritis, M54.41 Acute R LBP with R sciatica, R20.2 numbness and tingling, Z87.39 hx scoliosis              Rehab Codes: M43.16, M47.816, M54.41, R20.2  Onset Date: 2023                      Next 's appt.: TBD   visit#    Cancels/No Shows: 0/0    Subjective:    Pain:  [x] Yes  [] No Location: LBP  Pain Rating: (0-10 scale) 1/10  Pain altered Tx:  [] No  [x] Yes  Action: improved with prone extension protocol  Comments:     Fariha Ballesteros

## 2023-07-20 DIAGNOSIS — F41.9 ANXIETY: ICD-10-CM

## 2023-07-20 DIAGNOSIS — Z51.81 ENCOUNTER FOR THERAPEUTIC DRUG LEVEL MONITORING: Primary | ICD-10-CM

## 2023-07-20 NOTE — TELEPHONE ENCOUNTER
LOV 6/28/23   RTO on wait list for [facility]  LRF 6/20/23          Controlled Substance Monitoring:    Acute and Chronic Pain Monitoring:   RX Monitoring 6/16/2023   Attestation -   Periodic Controlled Substance Monitoring Possible medication side effects, risk of tolerance/dependence & alternative treatments discussed. ;No signs of potential drug abuse or diversion identified. ;Assessed functional status. ;Obtaining appropriate analgesic effect of treatment.

## 2023-07-21 ENCOUNTER — HOSPITAL ENCOUNTER (OUTPATIENT)
Age: 48
Setting detail: THERAPIES SERIES
Discharge: HOME | End: 2023-07-21
Payer: COMMERCIAL

## 2023-07-21 PROCEDURE — 97140 MANUAL THERAPY 1/> REGIONS: CPT

## 2023-07-21 PROCEDURE — 97110 THERAPEUTIC EXERCISES: CPT

## 2023-07-21 NOTE — FLOWSHEET NOTE
[] [address]  [address] [facility].  [phone]  F: [phone] [] [address] [facility]  [address] [facility] [address]   [address]  P: [phone]  F: [phone] [] [address]  [address] [facility]  P: [phone]  F: [phone] [] [place] [facility]: [phone]  F: [phone] [] [address] [facility]  [address] [facility]   [address]   P: [phone]  F: [phone]  [x] [address] [facility].   P: [phone]  F: [phone] [] [address] [facility]  [address] [facility]   [address]  P: [phone]  F: [phone] [] [address] [facility]  [address] [facility]  [place]: [phone]  F: [phone] [] [address] [facility] [address]  [place]: [phone]  F: [phone]      Physical Therapy Daily Treatment Note    Date:  2023  Patient Name:  Asim Rick    :  1975  MRN: [id number]  Physician: José Miguel OJEDA NP                              Insurance: [facility] 70/year  Medical Diagnosis: M43.16 spondylolisthesis L4-5, M47.816 Lumbar facet arthritis, M54.41 Acute R LBP with R sciatica, R20.2 numbness and tingling, Z87.39 hx scoliosis              Rehab Codes: M43.16, M47.816, M54.41, R20.2  Onset Date: 2023                      Next 's appt.: TBD   visit#    Cancels/No Shows: 0/0    Subjective:    Pain:  [x] Yes  [] No Location:  R LBP  Pain Rating: (0-10 scale) 2/10  Pain altered Tx:  [] No  [x] Yes  Action: improved with prone extension protocol  Comments:     Faby Bragg

## 2023-07-23 RX ORDER — ALPRAZOLAM 0.5 MG/1
0.5 TABLET ORAL DAILY PRN
Qty: 30 TABLET | Refills: 0 | OUTPATIENT
Start: 2023-07-23 | End: 2024-07-22

## 2023-07-24 NOTE — TELEPHONE ENCOUNTER
Please advise patient she needs to have appointment with provider in office for continued controlled medications. We do not have any urine drug screen on her. In order to fill this, this is required.

## 2023-07-26 RX ORDER — ALPRAZOLAM 0.5 MG/1
0.5 TABLET ORAL DAILY PRN
Qty: 30 TABLET | Refills: 0 | OUTPATIENT
Start: 2023-07-26 | End: 2024-07-25

## 2023-07-26 RX ORDER — ALPRAZOLAM 0.5 MG/1
0.5 TABLET ORAL DAILY PRN
Qty: 30 TABLET | Refills: 0 | Status: CANCELLED | OUTPATIENT
Start: 2023-07-26 | End: 2024-07-25

## 2023-07-27 NOTE — TELEPHONE ENCOUNTER
Patient aware. She is not in town at the moment. She will CB to set up NV when she returns. She has not had any medication currently.

## 2023-08-01 ENCOUNTER — NURSE ONLY (OUTPATIENT)
Dept: FAMILY MEDICINE CLINIC | Age: 48
End: 2023-08-01
Payer: COMMERCIAL

## 2023-08-01 DIAGNOSIS — Z51.81 ENCOUNTER FOR THERAPEUTIC DRUG LEVEL MONITORING: ICD-10-CM

## 2023-08-01 DIAGNOSIS — Z02.83 ENCOUNTER FOR DRUG SCREENING: Primary | ICD-10-CM

## 2023-08-01 PROCEDURE — 80305 DRUG TEST PRSMV DIR OPT OBS: CPT | Performed by: NURSE PRACTITIONER

## 2023-08-01 NOTE — PROGRESS NOTES
Patient stated prior to doing UDS that THC would pop up in her urine, as she ate an edible over the weekend.

## 2023-08-02 DIAGNOSIS — F41.9 ANXIETY: ICD-10-CM

## 2023-08-02 RX ORDER — ALPRAZOLAM 0.5 MG/1
0.5 TABLET ORAL DAILY PRN
Qty: 30 TABLET | Refills: 0 | Status: SHIPPED | OUTPATIENT
Start: 2023-08-02 | End: 2023-08-31 | Stop reason: SDUPTHER

## 2023-08-02 NOTE — TELEPHONE ENCOUNTER
LOV 6/28/23   RTO 12/28/23  LRF 6/20/23    Pt did UDS yesterday for a nurse visit           Controlled Substance Monitoring:    Acute and Chronic Pain Monitoring:   RX Monitoring 6/16/2023   Attestation -   Periodic Controlled Substance Monitoring Possible medication side effects, risk of tolerance/dependence & alternative treatments discussed. ;No signs of potential drug abuse or diversion identified. ;Assessed functional status. ;Obtaining appropriate analgesic effect of treatment.

## 2023-08-03 NOTE — TELEPHONE ENCOUNTER
This patient is getting alprazolam every month. The office policy is that she needs to be seen every 3-4 months for controlled substances. December follow up is not appropriate when her last visit was in June. She needs follow up for September or October. Medication refilled for this month.

## 2023-08-04 NOTE — TELEPHONE ENCOUNTER
Pt was added to wait list for new provider, as writer is unable to get pt in sooner with current provider

## 2023-08-31 DIAGNOSIS — F41.9 ANXIETY: ICD-10-CM

## 2023-08-31 NOTE — TELEPHONE ENCOUNTER
Patient is going to re-establish with Dr. Jessie Diaz on 12/28/23 but per Dr. Jessie Diaz she needs to be seen around September or October. The patient does not want to establish with anyone else in the office. Writer spoke with Catalina Olvera and she is going to see if there is anywhere we can get her in sooner and then call the patient back to let her know.

## 2023-09-01 DIAGNOSIS — F41.9 ANXIETY: ICD-10-CM

## 2023-09-01 NOTE — TELEPHONE ENCOUNTER
Per last refill note: This patient is getting alprazolam every month. The office policy is that she needs to be seen every 3-4 months for controlled substances. December follow up is not appropriate when her last visit was in June. She needs follow up for September or October. Medication refilled for this month.        Pt has upcoming appt 9/29/2023

## 2023-09-03 RX ORDER — ALPRAZOLAM 0.5 MG/1
0.5 TABLET ORAL DAILY PRN
Qty: 30 TABLET | Refills: 0 | Status: SHIPPED | OUTPATIENT
Start: 2023-09-03 | End: 2024-09-02

## 2023-09-06 RX ORDER — ALPRAZOLAM 0.5 MG/1
TABLET ORAL
Qty: 30 TABLET | Refills: 0 | OUTPATIENT
Start: 2023-09-06

## 2023-09-07 ENCOUNTER — TELEPHONE (OUTPATIENT)
Dept: FAMILY MEDICINE CLINIC | Age: 48
End: 2023-09-07

## 2023-09-07 ENCOUNTER — OFFICE VISIT (OUTPATIENT)
Dept: PRIMARY CARE CLINIC | Age: 48
End: 2023-09-07
Payer: COMMERCIAL

## 2023-09-07 VITALS
TEMPERATURE: 98.9 F | OXYGEN SATURATION: 99 % | BODY MASS INDEX: 18.5 KG/M2 | HEART RATE: 100 BPM | WEIGHT: 111.2 LBS | DIASTOLIC BLOOD PRESSURE: 62 MMHG | SYSTOLIC BLOOD PRESSURE: 102 MMHG

## 2023-09-07 DIAGNOSIS — M77.11 LATERAL EPICONDYLITIS OF RIGHT ELBOW: Primary | ICD-10-CM

## 2023-09-07 PROCEDURE — 99214 OFFICE O/P EST MOD 30 MIN: CPT | Performed by: FAMILY MEDICINE

## 2023-09-07 RX ORDER — ARM BRACE
1 EACH MISCELLANEOUS CONTINUOUS
Qty: 1 EACH | Refills: 0 | Status: SHIPPED | OUTPATIENT
Start: 2023-09-07 | End: 2023-10-07

## 2023-09-07 NOTE — TELEPHONE ENCOUNTER
Pt called into the office stating that she has a lump on her right arm near her elbow and her entire forearm is sore. She also states the lymph nodes in her left arm are swollen and hard and is about an inch or two wide. The mass started two weeks ago and the lymph nodes started last Friday. WI was recommended and following up on 9/29.

## 2023-09-28 SDOH — HEALTH STABILITY: PHYSICAL HEALTH: ON AVERAGE, HOW MANY MINUTES DO YOU ENGAGE IN EXERCISE AT THIS LEVEL?: 20 MIN

## 2023-09-28 SDOH — HEALTH STABILITY: PHYSICAL HEALTH: ON AVERAGE, HOW MANY DAYS PER WEEK DO YOU ENGAGE IN MODERATE TO STRENUOUS EXERCISE (LIKE A BRISK WALK)?: 3 DAYS

## 2023-09-29 ENCOUNTER — OFFICE VISIT (OUTPATIENT)
Dept: FAMILY MEDICINE CLINIC | Age: 48
End: 2023-09-29
Payer: COMMERCIAL

## 2023-09-29 VITALS
RESPIRATION RATE: 14 BRPM | SYSTOLIC BLOOD PRESSURE: 110 MMHG | DIASTOLIC BLOOD PRESSURE: 78 MMHG | BODY MASS INDEX: 19.34 KG/M2 | TEMPERATURE: 97.3 F | HEART RATE: 78 BPM | WEIGHT: 116.2 LBS

## 2023-09-29 DIAGNOSIS — Z23 NEED FOR INFLUENZA VACCINATION: ICD-10-CM

## 2023-09-29 DIAGNOSIS — E78.5 HYPERLIPIDEMIA, UNSPECIFIED HYPERLIPIDEMIA TYPE: ICD-10-CM

## 2023-09-29 DIAGNOSIS — K92.1 BLOOD IN STOOL: ICD-10-CM

## 2023-09-29 DIAGNOSIS — F41.1 GENERALIZED ANXIETY DISORDER: Primary | ICD-10-CM

## 2023-09-29 DIAGNOSIS — M25.422 SWELLING OF LEFT ELBOW: ICD-10-CM

## 2023-09-29 DIAGNOSIS — Z12.11 SCREENING FOR COLORECTAL CANCER: ICD-10-CM

## 2023-09-29 DIAGNOSIS — Z12.12 SCREENING FOR COLORECTAL CANCER: ICD-10-CM

## 2023-09-29 DIAGNOSIS — Z72.0 TOBACCO USE: ICD-10-CM

## 2023-09-29 DIAGNOSIS — J44.9 CHRONIC OBSTRUCTIVE PULMONARY DISEASE, UNSPECIFIED COPD TYPE (HCC): ICD-10-CM

## 2023-09-29 PROCEDURE — 90674 CCIIV4 VAC NO PRSV 0.5 ML IM: CPT | Performed by: PEDIATRICS

## 2023-09-29 PROCEDURE — 90471 IMMUNIZATION ADMIN: CPT | Performed by: PEDIATRICS

## 2023-09-29 PROCEDURE — 99214 OFFICE O/P EST MOD 30 MIN: CPT | Performed by: PEDIATRICS

## 2023-09-29 NOTE — PROGRESS NOTES
Sandra Mcguire (:  1975) is a 51 y.o. female,Established patient, here for evaluation of the following chief complaint(s):  Establish Care         ASSESSMENT/PLAN:    1. Generalized anxiety disorder  2. Hyperlipidemia, unspecified hyperlipidemia type  3. Chronic obstructive pulmonary disease, unspecified COPD type ([facility])  4. Tobacco use  5. Swelling of left elbow  6. Screening for colorectal cancer  -     Fecal DNA Colorectal cancer screening (Cologuard)  7. Blood in stool  8. Need for influenza vaccination  -     Influenza, FLUCELVAX, (age 10 mo+), IM, Preservative Free, 0.5 mL      Continue ativan as needed - wean as tolerated   Consider adding a controller medication to control anxiety better and wean ativan  Consider trialing buspar or hydroxyzine in place of ativan   Low cholesterol diet and healthy exercise   Obtain fasting labs when due   Continue symbicort - recommend take regularly to control symptoms better   Albuterol as needed   Smoking cessation encouraged   Monitor swelling of the left elbow - consider xray or MRI if symptoms worsen  Cologuard   Consider colonoscopy in the next year after stable at new job  Flu vaccine today   Call with concerns       Return in about 3 months (around 2023) for routine follow up. Subjective     HPI    Patient presents today for new to provider appointment. She was a previous to mine years ago but she was seeing several other providers here in the office over the course of the last several years. She is here for routine follow-up. She was working at [employer] but when they closed she has been out of work for the last 5 months. She does start a new job at [employer] clinic next week. She does have a history of generalized anxiety disorder, mild hyperlipidemia and COPD. His anxiety disorder is treated with Xanax as needed. She states she normally feels a lot of anxiety at nighttime and she will take her medication then.   This does help

## 2023-09-30 PROBLEM — J44.9 CHRONIC OBSTRUCTIVE PULMONARY DISEASE (HCC): Status: ACTIVE | Noted: 2023-09-30

## 2023-09-30 PROBLEM — E78.5 HYPERLIPIDEMIA: Status: ACTIVE | Noted: 2023-09-30

## 2023-09-30 PROBLEM — F41.1 GENERALIZED ANXIETY DISORDER: Status: ACTIVE | Noted: 2023-09-30

## 2023-09-30 ASSESSMENT — ENCOUNTER SYMPTOMS
VOMITING: 0
CONSTIPATION: 0
WHEEZING: 0
SINUS PRESSURE: 0
EYE DISCHARGE: 0
EYE PAIN: 0
DIARRHEA: 0
BACK PAIN: 1
TROUBLE SWALLOWING: 0
COLOR CHANGE: 0
BLOOD IN STOOL: 1
SHORTNESS OF BREATH: 0
NAUSEA: 0
CHEST TIGHTNESS: 0
ABDOMINAL PAIN: 0
RHINORRHEA: 0
COUGH: 0
EYE REDNESS: 0
SORE THROAT: 0

## 2023-10-03 DIAGNOSIS — F41.9 ANXIETY: ICD-10-CM

## 2023-10-03 NOTE — TELEPHONE ENCOUNTER
LOV 9/29/2023     RTO 12/28/2023  LRF 9/3/2023          Controlled Substance Monitoring:    Acute and Chronic Pain Monitoring:   RX Monitoring Periodic Controlled Substance Monitoring   9/29/2023   8:15 AM No signs of potential drug abuse or diversion identified.

## 2023-10-04 RX ORDER — ALPRAZOLAM 0.5 MG/1
0.5 TABLET ORAL DAILY PRN
Qty: 30 TABLET | Refills: 0 | Status: SHIPPED | OUTPATIENT
Start: 2023-10-04 | End: 2024-10-03

## 2023-10-23 LAB — NONINV COLON CA DNA+OCC BLD SCRN STL QL: POSITIVE

## 2023-10-24 ENCOUNTER — APPOINTMENT (OUTPATIENT)
Dept: INFUSION THERAPY | Age: 48
End: 2023-10-24

## 2023-11-04 DIAGNOSIS — F41.9 ANXIETY: ICD-10-CM

## 2023-11-06 NOTE — TELEPHONE ENCOUNTER
LOV 9-29-23   RTO 12-28-23  LRF 10-4-23          Controlled Substance Monitoring:    Acute and Chronic Pain Monitoring:   RX Monitoring Periodic Controlled Substance Monitoring   10/4/2023  11:23 PM No signs of potential drug abuse or diversion identified.

## 2023-11-07 RX ORDER — ALPRAZOLAM 0.5 MG/1
0.5 TABLET ORAL DAILY PRN
Qty: 30 TABLET | Refills: 0 | Status: SHIPPED | OUTPATIENT
Start: 2023-11-07 | End: 2024-11-06

## 2023-12-05 DIAGNOSIS — F41.9 ANXIETY: ICD-10-CM

## 2023-12-06 RX ORDER — ALPRAZOLAM 0.5 MG/1
0.5 TABLET ORAL DAILY PRN
Qty: 30 TABLET | Refills: 0 | Status: SHIPPED | OUTPATIENT
Start: 2023-12-06 | End: 2024-12-05

## 2023-12-06 RX ORDER — ACETAMINOPHEN 160 MG
1 TABLET,DISINTEGRATING ORAL DAILY
Qty: 30 CAPSULE | Refills: 5 | Status: SHIPPED | OUTPATIENT
Start: 2023-12-06 | End: 2024-12-05

## 2023-12-06 NOTE — TELEPHONE ENCOUNTER
LOV 9/29/23   RTO 12/28/23  LRF 3/21/23          Controlled Substance Monitoring:    Acute and Chronic Pain Monitoring:   RX Monitoring Periodic Controlled Substance Monitoring   11/7/2023  12:25 PM No signs of potential drug abuse or diversion identified.

## 2023-12-06 NOTE — TELEPHONE ENCOUNTER
LOV 9/29/23   RTO 12/28/23  LRF 11/7/23          Controlled Substance Monitoring:    Acute and Chronic Pain Monitoring:   RX Monitoring Periodic Controlled Substance Monitoring   11/7/2023  12:25 PM No signs of potential drug abuse or diversion identified.

## 2023-12-28 ENCOUNTER — OFFICE VISIT (OUTPATIENT)
Dept: FAMILY MEDICINE CLINIC | Age: 48
End: 2023-12-28
Payer: COMMERCIAL

## 2023-12-28 ENCOUNTER — TELEPHONE (OUTPATIENT)
Dept: FAMILY MEDICINE CLINIC | Age: 48
End: 2023-12-28

## 2023-12-28 ENCOUNTER — HOSPITAL ENCOUNTER (OUTPATIENT)
Age: 48
Setting detail: SPECIMEN
Discharge: HOME | End: 2023-12-28

## 2023-12-28 VITALS
DIASTOLIC BLOOD PRESSURE: 82 MMHG | WEIGHT: 117 LBS | BODY MASS INDEX: 19.49 KG/M2 | TEMPERATURE: 99.1 F | HEART RATE: 84 BPM | SYSTOLIC BLOOD PRESSURE: 132 MMHG | HEIGHT: 65 IN | OXYGEN SATURATION: 99 %

## 2023-12-28 DIAGNOSIS — E78.5 HYPERLIPIDEMIA, UNSPECIFIED HYPERLIPIDEMIA TYPE: ICD-10-CM

## 2023-12-28 DIAGNOSIS — R19.5 POSITIVE COLORECTAL CANCER SCREENING USING COLOGUARD TEST: ICD-10-CM

## 2023-12-28 DIAGNOSIS — R79.89 LOW VITAMIN D LEVEL: ICD-10-CM

## 2023-12-28 DIAGNOSIS — R79.89 LOW VITAMIN B12 LEVEL: ICD-10-CM

## 2023-12-28 DIAGNOSIS — Z01.84 IMMUNITY STATUS TESTING: ICD-10-CM

## 2023-12-28 DIAGNOSIS — R10.9 LEFT FLANK PAIN: ICD-10-CM

## 2023-12-28 DIAGNOSIS — Z87.442 HISTORY OF KIDNEY STONES: ICD-10-CM

## 2023-12-28 DIAGNOSIS — F41.1 GENERALIZED ANXIETY DISORDER: ICD-10-CM

## 2023-12-28 DIAGNOSIS — R10.12 LEFT UPPER QUADRANT ABDOMINAL PAIN: ICD-10-CM

## 2023-12-28 DIAGNOSIS — Z23 NEED FOR PNEUMOCOCCAL VACCINATION: ICD-10-CM

## 2023-12-28 DIAGNOSIS — F41.9 ANXIETY: Primary | ICD-10-CM

## 2023-12-28 DIAGNOSIS — R10.9 LEFT FLANK PAIN: Primary | ICD-10-CM

## 2023-12-28 LAB
25(OH)D3 SERPL-MCNC: 40 NG/ML
ALBUMIN SERPL-MCNC: 4.7 G/DL (ref 3.5–5.2)
ALBUMIN/GLOB SERPL: 1.9 {RATIO} (ref 1–2.5)
ALP SERPL-CCNC: 81 U/L (ref 35–104)
ALT SERPL-CCNC: 18 U/L (ref 5–33)
ANION GAP SERPL CALCULATED.3IONS-SCNC: 11 MMOL/L (ref 9–17)
AST SERPL-CCNC: 24 U/L
BILIRUB SERPL-MCNC: 0.3 MG/DL (ref 0.3–1.2)
BILIRUB UR QL STRIP: NEGATIVE
BUN SERPL-MCNC: 15 MG/DL (ref 6–20)
CALCIUM SERPL-MCNC: 9.6 MG/DL (ref 8.6–10.4)
CHLORIDE SERPL-SCNC: 100 MMOL/L (ref 98–107)
CHOLEST SERPL-MCNC: 245 MG/DL
CHOLESTEROL/HDL RATIO: 3.6
CLARITY UR: CLEAR
CO2 SERPL-SCNC: 28 MMOL/L (ref 20–31)
COLOR UR: YELLOW
COMMENT: NORMAL
CREAT SERPL-MCNC: 0.6 MG/DL (ref 0.5–0.9)
ERYTHROCYTE [DISTWIDTH] IN BLOOD BY AUTOMATED COUNT: 12.6 % (ref 11.8–14.4)
FOLATE SERPL-MCNC: 19.3 NG/ML
GFR SERPL CREATININE-BSD FRML MDRD: >60 ML/MIN/1.73M2
GLUCOSE SERPL-MCNC: 81 MG/DL (ref 70–99)
GLUCOSE UR STRIP-MCNC: NEGATIVE MG/DL
HBV SURFACE AB SERPL IA-ACNC: <3.5 MIU/ML
HCT VFR BLD AUTO: 48.6 % (ref 36.3–47.1)
HDLC SERPL-MCNC: 68 MG/DL
HGB BLD-MCNC: 15.7 G/DL (ref 11.9–15.1)
HGB UR QL STRIP.AUTO: NEGATIVE
KETONES UR STRIP-MCNC: NEGATIVE MG/DL
LDLC SERPL CALC-MCNC: 155 MG/DL (ref 0–130)
LEUKOCYTE ESTERASE UR QL STRIP: NEGATIVE
MCH RBC QN AUTO: 29.8 PG (ref 25.2–33.5)
MCHC RBC AUTO-ENTMCNC: 32.3 G/DL (ref 28.4–34.8)
MCV RBC AUTO: 92.2 FL (ref 82.6–102.9)
NITRITE UR QL STRIP: NEGATIVE
NRBC BLD-RTO: 0 PER 100 WBC
PH UR STRIP: 7.5 [PH] (ref 5–8)
PLATELET # BLD AUTO: 387 K/UL (ref 138–453)
PMV BLD AUTO: 10.9 FL (ref 8.1–13.5)
POTASSIUM SERPL-SCNC: 4.4 MMOL/L (ref 3.7–5.3)
PROT SERPL-MCNC: 7.2 G/DL (ref 6.4–8.3)
PROT UR STRIP-MCNC: NEGATIVE MG/DL
RBC # BLD AUTO: 5.27 M/UL (ref 3.95–5.11)
SODIUM SERPL-SCNC: 139 MMOL/L (ref 135–144)
SP GR UR STRIP: 1.01 (ref 1–1.03)
TRIGL SERPL-MCNC: 109 MG/DL
UROBILINOGEN UR STRIP-ACNC: NORMAL EU/DL (ref 0–1)
VIT B12 SERPL-MCNC: 652 PG/ML (ref 232–1245)
WBC OTHER # BLD: 10.3 K/UL (ref 3.5–11.3)

## 2023-12-28 PROCEDURE — 90677 PCV20 VACCINE IM: CPT | Performed by: PEDIATRICS

## 2023-12-28 PROCEDURE — 99214 OFFICE O/P EST MOD 30 MIN: CPT | Performed by: PEDIATRICS

## 2023-12-28 PROCEDURE — 90471 IMMUNIZATION ADMIN: CPT | Performed by: PEDIATRICS

## 2023-12-28 NOTE — TELEPHONE ENCOUNTER
Patient states [facility] will not do her CT scan until after the expected date of Jan 4th, 2024. Patient requesting date to be changed.    Please fax to [phone]

## 2023-12-29 ENCOUNTER — TELEPHONE (OUTPATIENT)
Age: 48
End: 2023-12-29

## 2023-12-29 LAB
MICROORGANISM SPEC CULT: NO GROWTH
SPECIMEN DESCRIPTION: NORMAL

## 2023-12-29 NOTE — TELEPHONE ENCOUNTER
Writer called patient left a voicemail on machine to schedule an appointment with Dr. Mauricio Jacobs

## 2024-01-03 DIAGNOSIS — E78.5 HYPERLIPIDEMIA, UNSPECIFIED HYPERLIPIDEMIA TYPE: ICD-10-CM

## 2024-01-03 DIAGNOSIS — D58.2 ELEVATED HEMOGLOBIN (HCC): Primary | ICD-10-CM

## 2024-01-05 DIAGNOSIS — F41.9 ANXIETY: ICD-10-CM

## 2024-01-05 NOTE — TELEPHONE ENCOUNTER
LOV 12-28-23   RTO 3-29-24  LRF 12-6-23          Controlled Substance Monitoring:    Acute and Chronic Pain Monitoring:   RX Monitoring Periodic Controlled Substance Monitoring   12/6/2023   7:50 AM No signs of potential drug abuse or diversion identified.

## 2024-01-06 RX ORDER — ALPRAZOLAM 0.5 MG/1
0.5 TABLET ORAL DAILY PRN
Qty: 30 TABLET | Refills: 0 | Status: SHIPPED | OUTPATIENT
Start: 2024-01-06 | End: 2025-01-05

## 2024-01-08 ENCOUNTER — TELEPHONE (OUTPATIENT)
Age: 49
End: 2024-01-08

## 2024-01-08 ENCOUNTER — OFFICE VISIT (OUTPATIENT)
Age: 49
End: 2024-01-08
Payer: COMMERCIAL

## 2024-01-08 ENCOUNTER — PATIENT MESSAGE (OUTPATIENT)
Dept: FAMILY MEDICINE CLINIC | Age: 49
End: 2024-01-08

## 2024-01-08 VITALS
DIASTOLIC BLOOD PRESSURE: 67 MMHG | TEMPERATURE: 97.7 F | HEART RATE: 96 BPM | HEIGHT: 65 IN | BODY MASS INDEX: 19.49 KG/M2 | OXYGEN SATURATION: 99 % | WEIGHT: 117 LBS | SYSTOLIC BLOOD PRESSURE: 117 MMHG

## 2024-01-08 DIAGNOSIS — R19.5 POSITIVE COLORECTAL CANCER SCREENING USING COLOGUARD TEST: Primary | ICD-10-CM

## 2024-01-08 DIAGNOSIS — Z87.442 HISTORY OF KIDNEY STONES: ICD-10-CM

## 2024-01-08 DIAGNOSIS — K92.1 HEMATOCHEZIA: ICD-10-CM

## 2024-01-08 DIAGNOSIS — J44.9 CHRONIC OBSTRUCTIVE PULMONARY DISEASE, UNSPECIFIED COPD TYPE (HCC): ICD-10-CM

## 2024-01-08 DIAGNOSIS — R10.9 LEFT FLANK PAIN: Primary | ICD-10-CM

## 2024-01-08 DIAGNOSIS — E78.5 HYPERLIPIDEMIA, UNSPECIFIED HYPERLIPIDEMIA TYPE: ICD-10-CM

## 2024-01-08 PROCEDURE — 99214 OFFICE O/P EST MOD 30 MIN: CPT | Performed by: INTERNAL MEDICINE

## 2024-01-08 RX ORDER — SODIUM, POTASSIUM,MAG SULFATES 17.5-3.13G
SOLUTION, RECONSTITUTED, ORAL ORAL
Qty: 1 EACH | Refills: 0 | Status: SHIPPED | OUTPATIENT
Start: 2024-01-08

## 2024-01-08 ASSESSMENT — ENCOUNTER SYMPTOMS
NAUSEA: 0
CONSTIPATION: 0
DIARRHEA: 0
VOICE CHANGE: 0
VOMITING: 0
BLOOD IN STOOL: 1
COUGH: 1
TROUBLE SWALLOWING: 0
ABDOMINAL DISTENTION: 1
ABDOMINAL PAIN: 1
COLOR CHANGE: 0

## 2024-01-08 NOTE — TELEPHONE ENCOUNTER
Procedure scheduled    Colonoscopy (Diagnostic)/Aziz  Dx: Positive colorectal cancer screening using Cologuard test  Tuesday 02/06/24 at 1:15 pm/Arrive at 11:15 am  [facility]; Surgery Entrance, back of hospital    PAT Phone Call: Tuesday 01/23/24 at 3:30 pm    SuPrep Split Bowel Prep given to pt in office. Pt instructed in office. Pt states she does not take any blood thinners. Pt voiced understanding of prep instructions.

## 2024-01-08 NOTE — TELEPHONE ENCOUNTER
From: Rula Gayle  To: Dr. Melissa Jacome  Sent: 1/8/2024 10:11 AM EST  Subject: CT order    [facility] would like corrections to order.  CT Abdomen WO stone protocol   Fax to [phone]. Thank you so much!

## 2024-01-08 NOTE — PROGRESS NOTES
IMPRESSION: Ms. Gayle is a 48 y.o. female with a past history remarkable for anxiety, depression, hyperlipidemia,, referred for evaluation of positive Cologuard testing.  Also has intermittent hematochezia.    Assessment  1. Positive colorectal cancer screening using Cologuard test    2. Chronic obstructive pulmonary disease, unspecified COPD type (HCC)    3. Hyperlipidemia, unspecified hyperlipidemia type    4. Hematochezia      Plan:  - Discussed the implications of positive cologuard screen and the need for colonoscopy. Discussed potential false positive results from cologuard. Discussed the risks and benefits of colonoscopy. Patient agreeable, will schedule.  - Will plan with MAC given risk factor as mentioned above    Rula was seen today for new patient.    Diagnoses and all orders for this visit:    Positive colorectal cancer screening using Cologuard test  -     COLONOSCOPY (Diagnostic); Future    Chronic obstructive pulmonary disease, unspecified COPD type (HCC)    Hyperlipidemia, unspecified hyperlipidemia type    Hematochezia             RTC:As needed    Additional comments:          Thank you for allowing me to participate in the care of Ms. Gayle. For any further questions please do not hesitate to contact me.      I have reviewed and agree with the MA/LPN ROS please refer to their documentation from today's encounter on a separate note.     Bry Zavaleta MD  Gastroenterology & Hepatology  Office #: [phone]          this note is created with the assistance of a speech recognition program.  While intending to generate a document that actually reflects the content of the visit, the document can still have some errors including those of syntax and sound a like substitutions which may escape proof reading.  It such instances, actual meaning can be extrapolated by contextual diversion.

## 2024-01-10 ENCOUNTER — TELEPHONE (OUTPATIENT)
Dept: FAMILY MEDICINE CLINIC | Age: 49
End: 2024-01-10

## 2024-01-10 DIAGNOSIS — Z87.442 HISTORY OF KIDNEY STONES: ICD-10-CM

## 2024-01-10 DIAGNOSIS — R10.9 LEFT FLANK PAIN: Primary | ICD-10-CM

## 2024-01-10 NOTE — TELEPHONE ENCOUNTER
Patient called asking for her ct of abdomen w wo contrast to be faxed to [facility] at [phone]. Writer faxed at 12:06 on 1/10/24

## 2024-01-11 NOTE — TELEPHONE ENCOUNTER
I believe JAYANT TUTTLE faxed the correct order today.  Please verify [facility] has the correct order.    Delete this order if they have the correct one.      Close encounter when complete.

## 2024-01-11 NOTE — TELEPHONE ENCOUNTER
Cat got fax at 6pm that [facility] needs a new order for CT without contrast because they didn't have one.      New order for CT abd / pelvis WITHOUT contrast placed.

## 2024-01-18 ENCOUNTER — TELEPHONE (OUTPATIENT)
Age: 49
End: 2024-01-18

## 2024-01-18 RX ORDER — SODIUM, POTASSIUM,MAG SULFATES 17.5-3.13G
SOLUTION, RECONSTITUTED, ORAL ORAL
Qty: 1 EACH | Refills: 0 | Status: SHIPPED | OUTPATIENT
Start: 2024-01-18

## 2024-01-18 NOTE — TELEPHONE ENCOUNTER
Patient called to tell the office that [facility] where her Prep was sent will not fill for the generic Prep . Patient will need new prep Prescription.

## 2024-01-19 NOTE — TELEPHONE ENCOUNTER
Writer spoke with Momo at [facility], [phone].  Momo said they filled this script yesterday. Momo said he doesn't see any issues regarding this medication, no notes regarding generic vs brand name. Momo said script is ready for . He said there is no charge for script.    Writer called pt and left voicemail message. Writer left in message she spoke with [facility] and there are no issues with bowel prep medication. Left in message script is ready for  and there is no charge.

## 2024-01-23 ENCOUNTER — HOSPITAL ENCOUNTER (OUTPATIENT)
Dept: PREADMISSION TESTING | Age: 49
Discharge: HOME | End: 2024-01-27

## 2024-01-23 VITALS — WEIGHT: 117 LBS | HEIGHT: 65 IN | BODY MASS INDEX: 19.49 KG/M2

## 2024-01-23 NOTE — PROGRESS NOTES
Pre-op Instructions For Out-Patient Endoscopy Surgery    Medication Instructions:  Please stop herbs and any supplements now (includes vitamins and minerals).    Please contact your surgeon and prescribing physician for pre-op instructions for any blood thinners.    If you have inhalers/aerosol treatments at home, please use them the morning of your surgery and bring the inhalers with you to the hospital.  Will use symbicort  Please take the following medications the morning of your surgery with a sip of water:    none    Surgery Instructions:  After midnight before surgery:  Do not eat or drink anything, including water, mints, gum, and hard candy.  You may brush your teeth without swallowing.  No smoking, chewing tobacco, or street drugs.  Will complete prep by 0800 then NPO  Please shower or bathe before surgery.       Please do not wear any cologne, lotion, powder, jewelry, piercings, perfume, makeup, nail polish, hair accessories, or hair spray on the day of surgery.  Wear loose comfortable clothing.    Leave your valuables at home but bring a payment source for any after-surgery prescriptions you plan to fill at [facility].  Bring a storage case for any glasses/contacts.    An adult who is responsible for you MUST drive you home and should be with you for the first 24 hours after surgery.     The Day of Surgery:  Arrive at [facility] Surgery Entrance at the time directed by your surgeon and check in at the desk.     If you have a living will or healthcare power of , please bring a copy.    You will be taken to the pre-op holding area where you will be prepared for surgery.  A physical assessment will be performed by a nurse practitioner or house officer.  Your IV will be started and you will meet your anesthesiologist.    When you go to surgery, your family will be directed to the surgical waiting room, where the doctor should speak with them after your surgery.    After

## 2024-01-24 DIAGNOSIS — Z87.442 HISTORY OF KIDNEY STONES: ICD-10-CM

## 2024-01-24 DIAGNOSIS — R10.9 LEFT FLANK PAIN: ICD-10-CM

## 2024-02-02 NOTE — PRE-PROCEDURE INSTRUCTIONS
No answer, left message ?  yes                           Unable to leave message ?    When were you told to arrive at hospital ?  1115/1315    Do you have a  ?    Are you on any blood thinners ?                     If yes when did you stop taking ?    Do you have your prep Rx filled and instruction ?      Nothing to eat the day before , only clear liquids.    Are you experiencing any covid symptoms ?     Do you have any infections or rash we should be aware of ?      Do you have the Hibiclens soap to use the night before and the morning of surgery ?    Nothing to eat or drink after midnight, only a sip of water to take any medication instructed to take the night before.  Wear comfortable clothing, leave any valuables at home, remove any jewelry and body piercing .

## 2024-02-05 ENCOUNTER — ANESTHESIA EVENT (OUTPATIENT)
Dept: ENDOSCOPY | Age: 49
End: 2024-02-05
Payer: COMMERCIAL

## 2024-02-05 DIAGNOSIS — F41.9 ANXIETY: ICD-10-CM

## 2024-02-06 ENCOUNTER — ANESTHESIA (OUTPATIENT)
Dept: ENDOSCOPY | Age: 49
End: 2024-02-06
Payer: COMMERCIAL

## 2024-02-06 ENCOUNTER — HOSPITAL ENCOUNTER (OUTPATIENT)
Age: 49
Setting detail: OUTPATIENT SURGERY
Discharge: HOME | End: 2024-02-06
Attending: INTERNAL MEDICINE | Admitting: INTERNAL MEDICINE
Payer: COMMERCIAL

## 2024-02-06 VITALS
SYSTOLIC BLOOD PRESSURE: 124 MMHG | WEIGHT: 117 LBS | HEART RATE: 81 BPM | RESPIRATION RATE: 16 BRPM | OXYGEN SATURATION: 100 % | DIASTOLIC BLOOD PRESSURE: 56 MMHG | BODY MASS INDEX: 19.49 KG/M2 | TEMPERATURE: 97.6 F | HEIGHT: 65 IN

## 2024-02-06 DIAGNOSIS — R19.5 POSITIVE COLORECTAL CANCER SCREENING USING COLOGUARD TEST: ICD-10-CM

## 2024-02-06 DIAGNOSIS — D12.6 COLON ADENOMA: Primary | ICD-10-CM

## 2024-02-06 PROCEDURE — 7100000011 HC PHASE II RECOVERY - ADDTL 15 MIN: Performed by: INTERNAL MEDICINE

## 2024-02-06 PROCEDURE — 3700000001 HC ADD 15 MINUTES (ANESTHESIA): Performed by: INTERNAL MEDICINE

## 2024-02-06 PROCEDURE — 7100000010 HC PHASE II RECOVERY - FIRST 15 MIN: Performed by: INTERNAL MEDICINE

## 2024-02-06 PROCEDURE — 2500000003 HC RX 250 WO HCPCS: Performed by: NURSE ANESTHETIST, CERTIFIED REGISTERED

## 2024-02-06 PROCEDURE — 3609010600 HC COLONOSCOPY POLYPECTOMY SNARE/COLD BIOPSY: Performed by: INTERNAL MEDICINE

## 2024-02-06 PROCEDURE — 81025 URINE PREGNANCY TEST: CPT

## 2024-02-06 PROCEDURE — 6360000002 HC RX W HCPCS: Performed by: NURSE ANESTHETIST, CERTIFIED REGISTERED

## 2024-02-06 PROCEDURE — 3700000000 HC ANESTHESIA ATTENDED CARE: Performed by: INTERNAL MEDICINE

## 2024-02-06 PROCEDURE — 2580000003 HC RX 258: Performed by: ANESTHESIOLOGY

## 2024-02-06 PROCEDURE — 2720000010 HC SURG SUPPLY STERILE: Performed by: INTERNAL MEDICINE

## 2024-02-06 PROCEDURE — 2709999900 HC NON-CHARGEABLE SUPPLY: Performed by: INTERNAL MEDICINE

## 2024-02-06 PROCEDURE — 88305 TISSUE EXAM BY PATHOLOGIST: CPT

## 2024-02-06 DEVICE — WORKING LENGTH 235CM, WORKING CHANNEL 2.8MM
Type: IMPLANTABLE DEVICE | Site: CECUM | Status: FUNCTIONAL
Brand: RESOLUTION 360 CLIP

## 2024-02-06 RX ORDER — LIDOCAINE HYDROCHLORIDE 10 MG/ML
1 INJECTION, SOLUTION EPIDURAL; INFILTRATION; INTRACAUDAL; PERINEURAL
Status: DISCONTINUED | OUTPATIENT
Start: 2024-02-06 | End: 2024-02-06 | Stop reason: HOSPADM

## 2024-02-06 RX ORDER — SODIUM CHLORIDE 0.9 % (FLUSH) 0.9 %
5-40 SYRINGE (ML) INJECTION PRN
Status: DISCONTINUED | OUTPATIENT
Start: 2024-02-06 | End: 2024-02-06 | Stop reason: HOSPADM

## 2024-02-06 RX ORDER — SODIUM CHLORIDE 0.9 % (FLUSH) 0.9 %
5-40 SYRINGE (ML) INJECTION EVERY 12 HOURS SCHEDULED
Status: DISCONTINUED | OUTPATIENT
Start: 2024-02-06 | End: 2024-02-06 | Stop reason: HOSPADM

## 2024-02-06 RX ORDER — SODIUM CHLORIDE, SODIUM LACTATE, POTASSIUM CHLORIDE, CALCIUM CHLORIDE 600; 310; 30; 20 MG/100ML; MG/100ML; MG/100ML; MG/100ML
INJECTION, SOLUTION INTRAVENOUS CONTINUOUS
Status: DISCONTINUED | OUTPATIENT
Start: 2024-02-06 | End: 2024-02-06 | Stop reason: HOSPADM

## 2024-02-06 RX ORDER — ALPRAZOLAM 0.5 MG/1
0.5 TABLET ORAL DAILY PRN
Qty: 30 TABLET | Refills: 0 | Status: SHIPPED | OUTPATIENT
Start: 2024-02-06 | End: 2025-02-05

## 2024-02-06 RX ORDER — PROPOFOL 10 MG/ML
INJECTION, EMULSION INTRAVENOUS PRN
Status: DISCONTINUED | OUTPATIENT
Start: 2024-02-06 | End: 2024-02-06 | Stop reason: SDUPTHER

## 2024-02-06 RX ORDER — PROPOFOL 10 MG/ML
INJECTION, EMULSION INTRAVENOUS CONTINUOUS PRN
Status: DISCONTINUED | OUTPATIENT
Start: 2024-02-06 | End: 2024-02-06 | Stop reason: SDUPTHER

## 2024-02-06 RX ORDER — SODIUM CHLORIDE 9 MG/ML
INJECTION, SOLUTION INTRAVENOUS PRN
Status: DISCONTINUED | OUTPATIENT
Start: 2024-02-06 | End: 2024-02-06 | Stop reason: HOSPADM

## 2024-02-06 RX ORDER — LIDOCAINE HYDROCHLORIDE 20 MG/ML
INJECTION, SOLUTION EPIDURAL; INFILTRATION; INTRACAUDAL; PERINEURAL PRN
Status: DISCONTINUED | OUTPATIENT
Start: 2024-02-06 | End: 2024-02-06 | Stop reason: SDUPTHER

## 2024-02-06 RX ADMIN — PROPOFOL 50 MG: 10 INJECTION, EMULSION INTRAVENOUS at 13:16

## 2024-02-06 RX ADMIN — SODIUM CHLORIDE, POTASSIUM CHLORIDE, SODIUM LACTATE AND CALCIUM CHLORIDE: 600; 310; 30; 20 INJECTION, SOLUTION INTRAVENOUS at 12:00

## 2024-02-06 RX ADMIN — PROPOFOL 100 MCG/KG/MIN: 10 INJECTION, EMULSION INTRAVENOUS at 13:16

## 2024-02-06 RX ADMIN — LIDOCAINE HYDROCHLORIDE 40 MG: 20 INJECTION, SOLUTION EPIDURAL; INFILTRATION; INTRACAUDAL; PERINEURAL at 13:16

## 2024-02-06 ASSESSMENT — ENCOUNTER SYMPTOMS
NAUSEA: 0
SINUS PRESSURE: 0
SHORTNESS OF BREATH: 0
ABDOMINAL PAIN: 0

## 2024-02-06 ASSESSMENT — PAIN - FUNCTIONAL ASSESSMENT
PAIN_FUNCTIONAL_ASSESSMENT: 0-10
PAIN_FUNCTIONAL_ASSESSMENT: NONE - DENIES PAIN

## 2024-02-06 ASSESSMENT — LIFESTYLE VARIABLES: SMOKING_STATUS: 1

## 2024-02-06 NOTE — TELEPHONE ENCOUNTER
LOV 12/28/23   RTO 3/29/24  LRF 1/6/24          Controlled Substance Monitoring:    Acute and Chronic Pain Monitoring:   RX Monitoring Periodic Controlled Substance Monitoring   1/6/2024  12:14 AM No signs of potential drug abuse or diversion identified.

## 2024-02-06 NOTE — ANESTHESIA PRE PROCEDURE
11:13 AM    HCT 48.6 12/28/2023 11:13 AM    MCV 92.2 12/28/2023 11:13 AM    RDW 12.6 12/28/2023 11:13 AM     12/28/2023 11:13 AM       CMP:   Lab Results   Component Value Date/Time     12/28/2023 11:13 AM    K 4.4 12/28/2023 11:13 AM     12/28/2023 11:13 AM    CO2 28 12/28/2023 11:13 AM    BUN 15 12/28/2023 11:13 AM    CREATININE 0.6 12/28/2023 11:13 AM    GFRAA >60 03/27/2019 08:00 AM    AGRATIO 1.9 12/28/2023 11:13 AM    LABGLOM >60 12/28/2023 11:13 AM    GLUCOSE 81 12/28/2023 11:13 AM    PROT 7.2 12/28/2023 11:13 AM    CALCIUM 9.6 12/28/2023 11:13 AM    BILITOT 0.3 12/28/2023 11:13 AM    ALKPHOS 81 12/28/2023 11:13 AM    AST 24 12/28/2023 11:13 AM    ALT 18 12/28/2023 11:13 AM       POC Tests: No results for input(s): \"POCGLU\", \"POCNA\", \"POCK\", \"POCCL\", \"POCBUN\", \"POCHEMO\", \"POCHCT\" in the last 72 hours.    Coags: No results found for: \"PROTIME\", \"INR\", \"APTT\"    HCG (If Applicable): No results found for: \"PREGTESTUR\", \"PREGSERUM\", \"HCG\", \"HCGQUANT\"     ABGs: No results found for: \"PHART\", \"PO2ART\", \"ARU5LGG\", \"SKA8EUQ\", \"BEART\", \"M8INCTMO\"     Type & Screen (If Applicable):  No results found for: \"LABABO\", \"LABRH\"    Drug/Infectious Status (If Applicable):  No results found for: \"HIV\", \"HEPCAB\"    COVID-19 Screening (If Applicable): No results found for: \"COVID19\"        Anesthesia Evaluation  Patient summary reviewed and Nursing notes reviewed   no history of anesthetic complications:   Airway: Mallampati: II  TM distance: >3 FB   Neck ROM: full  Mouth opening: > = 3 FB   Dental:          Pulmonary:normal exam  breath sounds clear to auscultation  (+)  COPD:          current smoker          Patient did not smoke on day of surgery.                 Cardiovascular:Negative CV ROS  Exercise tolerance: good (>4 METS)          Rhythm: regular  Rate: normal                    Neuro/Psych:   (+) neuromuscular disease:, psychiatric history:depression/anxiety             GI/Hepatic/Renal:   (+) renal

## 2024-02-06 NOTE — H&P
HISTORY and PHYSICAL  [facility]       NAME:  Rula Gayle  MRN: [id number]   YOB: 1975   Date: 2/6/2024   Age: 49 y.o.  Gender: female       COMPLAINT AND PRESENT HISTORY:   Rula Gayle is 49 y.o.,   female, having a Diagnostic Colonoscopy, for : Pre-Op Diagnosis Codes:     * Positive colorectal cancer screening using Cologuard test     No prior Colonoscopy was done . This is the first time.     Pt admits to intermittent left sided abdominal pain including bloating/gas.    No changes in bowel habits.    No diarrhea, constipation, pt admits to having some blood in stools sporadically, BRBPR.  No  black tarry stools.   Pt is unsure  hx of hemorrhoids.    No changes in appetite and unintended weight loss. Denies any nausea or vomiting.     No  heartburn, indigestion or acid reflux.      Pt denies Family Hx of colon cancer.    Medical history reviewed:   Patient voices feeling well today. Denies any recent fever or chills, chest pain/pressure.  Pt reports denies  SOB.     Patient has been NPO since midnight. No blood thinners     Patient states has taken all bowel prep with clear outcome.    Patient denies any personal or family problems with anesthesia.    RECENT LABS, IMAGING AND TESTING     Lab Results   Component Value Date    WBC 10.3 12/28/2023    RBC 5.27 (H) 12/28/2023    HGB 15.7 (H) 12/28/2023    HCT 48.6 (H) 12/28/2023    MCV 92.2 12/28/2023    MCH 29.8 12/28/2023    MCHC 32.3 12/28/2023    RDW 12.6 12/28/2023     12/28/2023    MPV 10.9 12/28/2023        Lab Results   Component Value Date     12/28/2023    K 4.4 12/28/2023     12/28/2023    CO2 28 12/28/2023    BUN 15 12/28/2023    CREATININE 0.6 12/28/2023    GLUCOSE 81 12/28/2023    CALCIUM 9.6 12/28/2023    PROT 7.2 12/28/2023    LABALBU 4.7 12/28/2023    BILITOT 0.3 12/28/2023    ALKPHOS 81 12/28/2023    AST 24 12/28/2023    ALT 18 12/28/2023         PAST MEDICAL HISTORY     Past

## 2024-02-06 NOTE — ANESTHESIA POSTPROCEDURE EVALUATION
Department of Anesthesiology  Postprocedure Note    Patient: Rula Gayle  MRN: [id number]  YOB: 1975  Date of evaluation: 2/6/2024    Procedure Summary       Date: 02/06/24 Room / Location: [place] [address] / [facility]    Anesthesia Start: 1313 Anesthesia Stop: 1422    Procedure: COLONOSCOPY SNARE  POLYPECTOMY HOT BIOPSY with Eleview and Cecal Clips X2 (Rectum) Diagnosis:       Positive colorectal cancer screening using Cologuard test      (Positive colorectal cancer screening using Cologuard test [R19.5])    Surgeons: Bry Zavaleta MD Responsible Provider: Dianne Dumas MD    Anesthesia Type: general ASA Status: 2            Anesthesia Type: No value filed.    Sudhir Phase I:      Sudhir Phase II: Sudhir Score: 8    Anesthesia Post Evaluation    Comments: POST- ANESTHESIA EVALUATION       Pt Name: Rula Gayle  MRN: [id number]  YOB: 1975  Date of evaluation: 2/6/2024  Time:  4:00 PM      BP (!) 124/56   Pulse 81   Temp 97.6 °F (36.4 °C)   Resp 16   Ht 1.651 m (5' 5\")   Wt 53.1 kg (117 lb)   LMP 04/01/2023 Comment: HCG urine negative  SpO2 100%   BMI 19.47 kg/m²      Consciousness Level  Awake  Cardiopulmonary Status  Stable  Pain Adequately Treated YES  Nausea / Vomiting  NO  Adequate Hydration  YES  Anesthesia Related Complications NONE      Electronically signed by Dianne Dumas MD on 2/6/2024 at 4:00 PM      No notable events documented.

## 2024-02-06 NOTE — OP NOTE
Colonoscopy report    Colonoscopy Procedure Note    Procedure:  Colonoscopy with snare polypectomy and endoscopic mucosal resection    Procedure Date: 2/6/2024    Indications: Positive Cologuard testing.  Hematochezia.    Sedation:  MAC    Attending Physician:  Dr. Bry Zavaleta MD.     Assistant Physician: None    Consent:   Informed consent was obtained for the procedure after explaining the risks including bleeding, perforation, aspiration, arrhythmia, risks related to sedation, reaction to medications and rarely death, benefits and alternatives to the patient. The patient verbalized understanding and agreed to proceed with the procedure.       Procedure Details:  The patient was placed in the left lateral decubitus position.  Oxygen and cardiac monitoring equipment was attached. The patient's vital signs were monitored continuously  throughout the procedure. After appropriate sedation was achieved, a rectal examination was performed.  The pediatric colonoscope was inserted into the rectum and advanced under direct vision to the cecum which was identified by the ileocecal valve and appendiceal orifice.  The quality of the colonic preparation was good.  A careful inspection was made as the colonoscope was withdrawn, including a retroflexed view of the rectum; findings and interventions are described below.  Appropriate photodocumentation was obtained.           Complications:  None    Estimated blood loss:  Minimal           Disposition:  Home           Condition: stable    Withdrawal Time: 27-minute    Findings:   The bowel prep was good.  The colon was tortuous  A 2.5 cm laterally spreading polyp was noted in the cecum involving the appendiceal orifice.  Preparations were made for endoscopic mucosal resection. The polyp was raised with Eleview 15 cc.  The polyp showed an adequate lift. The surrounding mucosa was demarcated. Using a medium hot snare, the polyp was resected in piecemeal fashion.  Post resection,

## 2024-02-09 LAB — SURGICAL PATHOLOGY REPORT: NORMAL

## 2024-02-13 ENCOUNTER — TELEPHONE (OUTPATIENT)
Dept: GASTROENTEROLOGY | Age: 49
End: 2024-02-13

## 2024-02-16 NOTE — TELEPHONE ENCOUNTER
----- Message from Bry Zavaleta MD sent at 2/9/2024 12:35 PM EST -----  Please schedule for a repeat colonoscopy in 6 months.    ----- Message -----  From: Leonid Sellers Incoming Lab Results From [facility]  Sent: 2/9/2024  11:33 AM EST  To: Bry Zavaleta MD      
Pt returned phone call. Writer told her Dr. Zavaleta wanted a repeat colonoscopy in 6 months.    Pt said she would like a phone call around June to schedule procedure. She said she won't know her schedule 6 months from now.    Writer told her she would call her at the beginning of June.  
Writer called pt, no answer. Writer left pt voicemail message to call office back.   
no wheezing/no dyspnea/no cough

## 2024-03-05 DIAGNOSIS — F41.9 ANXIETY: ICD-10-CM

## 2024-03-05 NOTE — TELEPHONE ENCOUNTER
LOV 12/28/2023   RTO 3/29/24  LRF 2/6/24          Controlled Substance Monitoring:    Acute and Chronic Pain Monitoring:   RX Monitoring Periodic Controlled Substance Monitoring   2/6/2024   5:52 PM No signs of potential drug abuse or diversion identified.

## 2024-03-07 RX ORDER — ALPRAZOLAM 0.5 MG/1
0.5 TABLET ORAL DAILY PRN
Qty: 30 TABLET | Refills: 0 | Status: SHIPPED | OUTPATIENT
Start: 2024-03-07 | End: 2025-03-07

## 2024-03-29 ENCOUNTER — OFFICE VISIT (OUTPATIENT)
Dept: FAMILY MEDICINE CLINIC | Age: 49
End: 2024-03-29
Payer: COMMERCIAL

## 2024-03-29 ENCOUNTER — HOSPITAL ENCOUNTER (OUTPATIENT)
Age: 49
Setting detail: SPECIMEN
Discharge: HOME | End: 2024-03-29

## 2024-03-29 VITALS
DIASTOLIC BLOOD PRESSURE: 80 MMHG | HEART RATE: 92 BPM | OXYGEN SATURATION: 98 % | SYSTOLIC BLOOD PRESSURE: 122 MMHG | TEMPERATURE: 97.6 F | BODY MASS INDEX: 20.47 KG/M2 | WEIGHT: 123 LBS

## 2024-03-29 DIAGNOSIS — D58.2 ELEVATED HEMOGLOBIN (HCC): ICD-10-CM

## 2024-03-29 DIAGNOSIS — R53.83 OTHER FATIGUE: ICD-10-CM

## 2024-03-29 DIAGNOSIS — E78.5 HYPERLIPIDEMIA, UNSPECIFIED HYPERLIPIDEMIA TYPE: ICD-10-CM

## 2024-03-29 DIAGNOSIS — R10.12 LEFT UPPER QUADRANT ABDOMINAL PAIN: ICD-10-CM

## 2024-03-29 DIAGNOSIS — D12.0 ADENOMATOUS POLYP OF CECUM: ICD-10-CM

## 2024-03-29 DIAGNOSIS — R41.89 BRAIN FOG: ICD-10-CM

## 2024-03-29 DIAGNOSIS — N95.1 MENOPAUSAL SYMPTOMS: ICD-10-CM

## 2024-03-29 DIAGNOSIS — Z12.31 BREAST CANCER SCREENING BY MAMMOGRAM: ICD-10-CM

## 2024-03-29 DIAGNOSIS — Z23 NEED FOR HEPATITIS B VACCINATION: ICD-10-CM

## 2024-03-29 DIAGNOSIS — Z72.0 TOBACCO USE: ICD-10-CM

## 2024-03-29 DIAGNOSIS — R10.9 LEFT FLANK PAIN: ICD-10-CM

## 2024-03-29 DIAGNOSIS — M41.9 SCOLIOSIS, UNSPECIFIED SCOLIOSIS TYPE, UNSPECIFIED SPINAL REGION: ICD-10-CM

## 2024-03-29 DIAGNOSIS — F41.1 GENERALIZED ANXIETY DISORDER: Primary | ICD-10-CM

## 2024-03-29 DIAGNOSIS — D12.5 ADENOMATOUS POLYP OF SIGMOID COLON: ICD-10-CM

## 2024-03-29 DIAGNOSIS — M79.18 MUSCULOSKELETAL PAIN: ICD-10-CM

## 2024-03-29 LAB
ALT SERPL-CCNC: 35 U/L (ref 10–35)
AST SERPL-CCNC: 31 U/L (ref 10–35)
CHOLEST SERPL-MCNC: 225 MG/DL (ref 0–199)
CHOLESTEROL/HDL RATIO: 3
ERYTHROCYTE [DISTWIDTH] IN BLOOD BY AUTOMATED COUNT: 13.1 % (ref 11.8–14.4)
FSH SERPL-ACNC: 99.4 MIU/ML
HCT VFR BLD AUTO: 44 % (ref 36.3–47.1)
HDLC SERPL-MCNC: 72 MG/DL
HGB BLD-MCNC: 14.3 G/DL (ref 11.9–15.1)
LDLC SERPL CALC-MCNC: 125 MG/DL (ref 0–100)
LH SERPL-ACNC: 59.6 MIU/ML (ref 1.7–8.6)
MCH RBC QN AUTO: 29.9 PG (ref 25.2–33.5)
MCHC RBC AUTO-ENTMCNC: 32.5 G/DL (ref 28.4–34.8)
MCV RBC AUTO: 92.1 FL (ref 82.6–102.9)
NRBC BLD-RTO: 0 PER 100 WBC
PLATELET # BLD AUTO: 328 K/UL (ref 138–453)
PMV BLD AUTO: 10.5 FL (ref 8.1–13.5)
RBC # BLD AUTO: 4.78 M/UL (ref 3.95–5.11)
TRIGL SERPL-MCNC: 142 MG/DL (ref 0–149)
TSH SERPL DL<=0.05 MIU/L-ACNC: 1.03 UIU/ML (ref 0.27–4.2)
VLDLC SERPL CALC-MCNC: 28 MG/DL
WBC OTHER # BLD: 10.1 K/UL (ref 3.5–11.3)

## 2024-03-29 PROCEDURE — 90746 HEPB VACCINE 3 DOSE ADULT IM: CPT | Performed by: PEDIATRICS

## 2024-03-29 PROCEDURE — 90471 IMMUNIZATION ADMIN: CPT | Performed by: PEDIATRICS

## 2024-03-29 PROCEDURE — 99214 OFFICE O/P EST MOD 30 MIN: CPT | Performed by: PEDIATRICS

## 2024-03-29 NOTE — PROGRESS NOTES
Rula Gayle (:  1975) is a 49 y.o. female,Established patient, here for evaluation of the following chief complaint(s):    Anxiety and Medication Check         ASSESSMENT/PLAN:    1. Generalized anxiety disorder  2. Left flank pain  3. Left upper quadrant abdominal pain  4. Musculoskeletal pain  5. Scoliosis, unspecified scoliosis type, unspecified spinal region  6. Adenomatous polyp of cecum  7. Adenomatous polyp of sigmoid colon  8. Hyperlipidemia, unspecified hyperlipidemia type  9. Elevated hemoglobin (HCC)  10. Tobacco use  11. Menopausal symptoms  -     Follicle Stimulating Hormone; Future  -     Luteinizing Hormone; Future  -     TSH; Future  12. Brain fog  13. Other fatigue  -     TSH; Future  14. Need for hepatitis B vaccination  -     Hep B, ENGERIX-B, (age 20 yrs+), IM, 1mL, 3-dose  15. Breast cancer screening by mammogram  -     [facility] BOGDAN DIGITAL SCREEN BILATERAL; Future         Continue Xanax as needed  UDS and CSM are up-to-date  Reassurance that flank pain and left upper quadrant abdominal pain is likely related to something musculoskeletal relating to her scoliosis  Repeat colonoscopy in 2024  Obtain fasting labs today for reevaluation of hyperlipidemia, elevated hemoglobin and hematocrit  Smoking cessation encouraged  Obtain FSH and LH as well as thyroid screening lab  Consider treatment with SSRI for treatment of menopausal symptoms and anxiety if symptoms worsen  Proceed with hepatitis B vaccine #1 today  Obtain mammogram  Schedule Pap smear  Shingles vaccine recommended  COVID-vaccine recommended  Call with concerns        Return in about 3 months (around 2024) for routine follow up anxiety.         Subjective     HPI    Patient presents today for routine follow-up of anxiety.  She does have a history of generalized anxiety disorder and takes Xanax as needed.  She does continue to have a lot of stress with her adult daughter and she just found out her  likely has

## 2024-03-30 ASSESSMENT — ENCOUNTER SYMPTOMS
DIARRHEA: 0
RHINORRHEA: 0
EYE DISCHARGE: 0
COLOR CHANGE: 0
VOMITING: 0
SHORTNESS OF BREATH: 0
ABDOMINAL PAIN: 1
SINUS PRESSURE: 0
BACK PAIN: 1
SORE THROAT: 0
COUGH: 0
NAUSEA: 0
TROUBLE SWALLOWING: 0
CHEST TIGHTNESS: 0
WHEEZING: 0
EYE REDNESS: 0
EYE PAIN: 0
CONSTIPATION: 0

## 2024-04-02 DIAGNOSIS — J44.9 CHRONIC OBSTRUCTIVE PULMONARY DISEASE, UNSPECIFIED COPD TYPE (HCC): ICD-10-CM

## 2024-04-02 DIAGNOSIS — R53.83 OTHER FATIGUE: ICD-10-CM

## 2024-04-02 DIAGNOSIS — D58.2 ELEVATED HEMOGLOBIN (HCC): ICD-10-CM

## 2024-04-02 DIAGNOSIS — N20.0 NEPHROLITHIASIS: ICD-10-CM

## 2024-04-02 DIAGNOSIS — E78.5 HYPERLIPIDEMIA, UNSPECIFIED HYPERLIPIDEMIA TYPE: Primary | ICD-10-CM

## 2024-04-02 DIAGNOSIS — R79.89 LOW VITAMIN D LEVEL: ICD-10-CM

## 2024-04-02 DIAGNOSIS — R79.89 LOW VITAMIN B12 LEVEL: ICD-10-CM

## 2024-04-05 DIAGNOSIS — F41.9 ANXIETY: ICD-10-CM

## 2024-04-05 RX ORDER — ALPRAZOLAM 0.5 MG/1
0.5 TABLET ORAL DAILY PRN
Qty: 30 TABLET | Refills: 0 | Status: SHIPPED | OUTPATIENT
Start: 2024-04-05 | End: 2025-04-05

## 2024-04-05 NOTE — TELEPHONE ENCOUNTER
LOV 3/29/24   RTO 7/3/24  LRF 3/7/24          Controlled Substance Monitoring:    Acute and Chronic Pain Monitoring:   RX Monitoring Periodic Controlled Substance Monitoring   3/7/2024  12:04 AM No signs of potential drug abuse or diversion identified.

## 2024-04-22 DIAGNOSIS — Z12.31 BREAST CANCER SCREENING BY MAMMOGRAM: ICD-10-CM

## 2024-04-23 DIAGNOSIS — Z91.89 AT HIGH RISK FOR BREAST CANCER: Primary | ICD-10-CM

## 2024-04-23 DIAGNOSIS — R92.343 EXTREMELY DENSE TISSUE OF BOTH BREASTS ON MAMMOGRAPHY: ICD-10-CM

## 2024-05-05 DIAGNOSIS — F41.9 ANXIETY: ICD-10-CM

## 2024-05-06 NOTE — TELEPHONE ENCOUNTER
LOV 3-29-24   RTO 7-3-24  LRF 4-5-24          Controlled Substance Monitoring:    Acute and Chronic Pain Monitoring:   RX Monitoring Periodic Controlled Substance Monitoring   4/5/2024   5:00 PM No signs of potential drug abuse or diversion identified.

## 2024-05-07 RX ORDER — ALPRAZOLAM 0.5 MG/1
0.5 TABLET ORAL DAILY PRN
Qty: 30 TABLET | Refills: 0 | Status: SHIPPED | OUTPATIENT
Start: 2024-05-07 | End: 2025-05-07

## 2024-06-06 DIAGNOSIS — F41.9 ANXIETY: ICD-10-CM

## 2024-06-06 NOTE — TELEPHONE ENCOUNTER
LOV 3/29/24   RTO 7/3/24  LRF 5/7/24          Controlled Substance Monitoring:    Acute and Chronic Pain Monitoring:   RX Monitoring Periodic Controlled Substance Monitoring   5/7/2024  11:52 PM No signs of potential drug abuse or diversion identified.

## 2024-06-07 RX ORDER — ALPRAZOLAM 0.5 MG/1
0.5 TABLET ORAL DAILY PRN
Qty: 30 TABLET | Refills: 0 | Status: SHIPPED | OUTPATIENT
Start: 2024-06-07 | End: 2025-06-07

## 2024-07-03 ENCOUNTER — OFFICE VISIT (OUTPATIENT)
Dept: FAMILY MEDICINE CLINIC | Age: 49
End: 2024-07-03
Payer: COMMERCIAL

## 2024-07-03 VITALS
OXYGEN SATURATION: 99 % | DIASTOLIC BLOOD PRESSURE: 76 MMHG | TEMPERATURE: 98.7 F | WEIGHT: 122.6 LBS | BODY MASS INDEX: 20.43 KG/M2 | HEIGHT: 65 IN | SYSTOLIC BLOOD PRESSURE: 120 MMHG | HEART RATE: 92 BPM

## 2024-07-03 DIAGNOSIS — Z51.81 THERAPEUTIC DRUG MONITORING: ICD-10-CM

## 2024-07-03 DIAGNOSIS — E78.5 HYPERLIPIDEMIA, UNSPECIFIED HYPERLIPIDEMIA TYPE: ICD-10-CM

## 2024-07-03 DIAGNOSIS — D12.5 ADENOMATOUS POLYP OF SIGMOID COLON: ICD-10-CM

## 2024-07-03 DIAGNOSIS — F41.1 GENERALIZED ANXIETY DISORDER: Primary | ICD-10-CM

## 2024-07-03 DIAGNOSIS — D58.2 ELEVATED HEMOGLOBIN (HCC): ICD-10-CM

## 2024-07-03 DIAGNOSIS — D12.0 ADENOMATOUS POLYP OF CECUM: ICD-10-CM

## 2024-07-03 DIAGNOSIS — Z72.0 TOBACCO USE: ICD-10-CM

## 2024-07-03 LAB
ALCOHOL URINE: NORMAL
AMPHETAMINE SCREEN URINE: NORMAL
BARBITURATE SCREEN URINE: NORMAL
BENZODIAZEPINE SCREEN, URINE: POSITIVE
BUPRENORPHINE URINE: NORMAL
COCAINE METABOLITE SCREEN URINE: NORMAL
FENTANYL SCREEN, URINE: NORMAL
GABAPENTIN SCREEN, URINE: NORMAL
MDMA URINE: NORMAL
METHADONE SCREEN, URINE: NORMAL
METHAMPHETAMINE, URINE: NORMAL
OPIATE SCREEN URINE: NORMAL
OXYCODONE SCREEN URINE: NORMAL
PHENCYCLIDINE SCREEN URINE: NORMAL
PROPOXYPHENE SCREEN, URINE: NORMAL
SYNTHETIC CANNABINOIDS(K2) SCREEN, URINE: NORMAL
THC SCREEN, URINE: POSITIVE
TRAMADOL SCREEN URINE: NORMAL
TRICYCLIC ANTIDEPRESSANTS, UR: NORMAL

## 2024-07-03 PROCEDURE — 3017F COLORECTAL CA SCREEN DOC REV: CPT | Performed by: PEDIATRICS

## 2024-07-03 PROCEDURE — 80305 DRUG TEST PRSMV DIR OPT OBS: CPT | Performed by: PEDIATRICS

## 2024-07-03 PROCEDURE — G8420 CALC BMI NORM PARAMETERS: HCPCS | Performed by: PEDIATRICS

## 2024-07-03 PROCEDURE — G8427 DOCREV CUR MEDS BY ELIG CLIN: HCPCS | Performed by: PEDIATRICS

## 2024-07-03 PROCEDURE — 99214 OFFICE O/P EST MOD 30 MIN: CPT | Performed by: PEDIATRICS

## 2024-07-03 PROCEDURE — 4004F PT TOBACCO SCREEN RCVD TLK: CPT | Performed by: PEDIATRICS

## 2024-07-03 RX ORDER — ALPRAZOLAM 0.5 MG/1
0.5 TABLET ORAL DAILY PRN
Qty: 30 TABLET | Refills: 0 | Status: SHIPPED | OUTPATIENT
Start: 2024-07-03 | End: 2025-07-03

## 2024-07-03 RX ORDER — ALPRAZOLAM 0.5 MG/1
0.5 TABLET ORAL DAILY PRN
Qty: 30 TABLET | Refills: 0 | Status: CANCELLED | OUTPATIENT
Start: 2024-07-03 | End: 2025-07-03

## 2024-07-03 SDOH — ECONOMIC STABILITY: FOOD INSECURITY: WITHIN THE PAST 12 MONTHS, THE FOOD YOU BOUGHT JUST DIDN'T LAST AND YOU DIDN'T HAVE MONEY TO GET MORE.: NEVER TRUE

## 2024-07-03 SDOH — ECONOMIC STABILITY: FOOD INSECURITY: WITHIN THE PAST 12 MONTHS, YOU WORRIED THAT YOUR FOOD WOULD RUN OUT BEFORE YOU GOT MONEY TO BUY MORE.: NEVER TRUE

## 2024-07-03 SDOH — ECONOMIC STABILITY: INCOME INSECURITY: HOW HARD IS IT FOR YOU TO PAY FOR THE VERY BASICS LIKE FOOD, HOUSING, MEDICAL CARE, AND HEATING?: NOT HARD AT ALL

## 2024-07-03 ASSESSMENT — PATIENT HEALTH QUESTIONNAIRE - PHQ9
SUM OF ALL RESPONSES TO PHQ QUESTIONS 1-9: 0
SUM OF ALL RESPONSES TO PHQ9 QUESTIONS 1 & 2: 0
SUM OF ALL RESPONSES TO PHQ9 QUESTIONS 1 & 2: 0
1. LITTLE INTEREST OR PLEASURE IN DOING THINGS: NOT AT ALL
1. LITTLE INTEREST OR PLEASURE IN DOING THINGS: NOT AT ALL
SUM OF ALL RESPONSES TO PHQ QUESTIONS 1-9: 0
2. FEELING DOWN, DEPRESSED OR HOPELESS: NOT AT ALL
2. FEELING DOWN, DEPRESSED OR HOPELESS: NOT AT ALL

## 2024-07-03 NOTE — PROGRESS NOTES
Rula Gayle (:  1975) is a 49 y.o. female,Established patient, here for evaluation of the following chief complaint(s):    Anxiety      Assessment & Plan     1. Generalized anxiety disorder  -     ALPRAZolam (XANAX) 0.5 MG tablet; Take 1 tablet by mouth daily as needed for Sleep or Anxiety., Disp-30 tablet, R-0Normal  2. Therapeutic drug monitoring  -     POCT Rapid Drug Screen  3. Adenomatous polyp of cecum  4. Adenomatous polyp of sigmoid colon  5. Hyperlipidemia, unspecified hyperlipidemia type  6. Elevated hemoglobin (HCC)  7. Tobacco use      Continue Xanax as needed  Consider prophylactic controller medication if anxiety symptoms worsen  Obtain UDS  CSM signed  Repeat colonoscopy due in 2024  Routine fasting lipids due in 2024  Repeat CBC in 2024  Smoking cessation encouraged  Tdap vaccine today - patient left before getting vaccination - will update at next visit   COVID-19 vaccine recommended  Schedule Pap  Call with concerns        Return in about 3 months (around 10/3/2024) for routine follow up.       Subjective     HPI    Patient presents for routine follow-up of anxiety.  She does have a history of generalized anxiety disorder and uses Xanax as needed.  She does feel as though this controls her symptoms well.  She does not wish to start any type of preventative treatment.  She will consider this if her symptoms worsen.  She denies any side effects from her medication.  Her UDS is due.  She does admit to using an occasional THC gummy for anxiety or sleep.  She did have 2 polyps removed when she had a colonoscopy in early .  One polyp was a 2.5 cm spreading polyp in the cecum that was removed by endoscopic mucosal resection.  There was another 7 to 8 mm polyp in the sigmoid colon that was removed with a hot snare.  It was recommended that she have a repeat colonoscopy in 6 months to reassess the resection site in the cecum.  This is due in 2024.  She

## 2024-08-04 DIAGNOSIS — F41.1 GENERALIZED ANXIETY DISORDER: ICD-10-CM

## 2024-08-05 RX ORDER — ALPRAZOLAM 0.5 MG/1
0.5 TABLET ORAL DAILY PRN
Qty: 30 TABLET | Refills: 0 | Status: SHIPPED | OUTPATIENT
Start: 2024-08-05 | End: 2025-08-05

## 2024-08-05 NOTE — TELEPHONE ENCOUNTER
Rula Gayle is calling to request a refill on the following medication(s):    Last Visit Date (If Applicable):  7/3/2024    Next Visit Date:    10/3/2024    Medication Request:  Requested Prescriptions     Pending Prescriptions Disp Refills    ALPRAZolam (XANAX) 0.5 MG tablet 30 tablet 0     Sig: Take 1 tablet by mouth daily as needed for Sleep or Anxiety.

## 2024-09-03 DIAGNOSIS — F41.1 GENERALIZED ANXIETY DISORDER: ICD-10-CM

## 2024-09-04 RX ORDER — ALPRAZOLAM 0.5 MG
0.5 TABLET ORAL DAILY PRN
Qty: 30 TABLET | Refills: 0 | Status: SHIPPED | OUTPATIENT
Start: 2024-09-04 | End: 2025-09-04

## 2024-09-04 NOTE — TELEPHONE ENCOUNTER
LOV 7/3/24   RTO 10/3/24  LRF 8/5/24          Controlled Substance Monitoring:    Acute and Chronic Pain Monitoring:   RX Monitoring Periodic Controlled Substance Monitoring   8/5/2024   9:50 PM No signs of potential drug abuse or diversion identified.

## 2024-10-02 NOTE — PROGRESS NOTES
(occasional). Negative for behavioral problems, confusion and suicidal ideas. The patient is nervous/anxious.           Objective     Physical Exam  Vitals and nursing note reviewed.   Constitutional:       General: She is not in acute distress.     Appearance: Normal appearance. She is well-developed. She is not ill-appearing, toxic-appearing or diaphoretic.   HENT:      Head: Normocephalic and atraumatic.      Right Ear: Tympanic membrane, ear canal and external ear normal. There is no impacted cerumen.      Left Ear: Tympanic membrane, ear canal and external ear normal. There is no impacted cerumen.      Nose: Nose normal. No congestion.      Mouth/Throat:      Pharynx: No oropharyngeal exudate.   Eyes:      General: No scleral icterus.        Right eye: No discharge.         Left eye: No discharge.      Pupils: Pupils are equal, round, and reactive to light.   Neck:      Thyroid: No thyromegaly.      Vascular: No JVD.   Cardiovascular:      Rate and Rhythm: Normal rate and regular rhythm.      Heart sounds: Normal heart sounds. No murmur heard.  Pulmonary:      Effort: Pulmonary effort is normal. No respiratory distress.      Breath sounds: Normal breath sounds. No wheezing or rales.   Abdominal:      General: Bowel sounds are normal.      Palpations: Abdomen is soft. There is no mass.      Tenderness: There is no abdominal tenderness. There is no right CVA tenderness, left CVA tenderness or guarding.   Musculoskeletal:         General: Normal range of motion.      Right elbow: Normal range of motion. No tenderness.      Left elbow: Normal range of motion. No tenderness.      Cervical back: Normal range of motion.      Right lower leg: No edema.      Left lower leg: No edema.   Lymphadenopathy:      Cervical: No cervical adenopathy.   Skin:     General: Skin is warm and dry.      Capillary Refill: Capillary refill takes less than 2 seconds.      Findings: No erythema or rash.   Neurological:      Mental Status:

## 2024-10-03 ENCOUNTER — OFFICE VISIT (OUTPATIENT)
Dept: FAMILY MEDICINE CLINIC | Age: 49
End: 2024-10-03
Payer: COMMERCIAL

## 2024-10-03 ENCOUNTER — HOSPITAL ENCOUNTER (OUTPATIENT)
Age: 49
Setting detail: SPECIMEN
Discharge: HOME | End: 2024-10-03

## 2024-10-03 VITALS
WEIGHT: 122 LBS | HEIGHT: 65 IN | OXYGEN SATURATION: 97 % | TEMPERATURE: 98.1 F | HEART RATE: 75 BPM | DIASTOLIC BLOOD PRESSURE: 82 MMHG | SYSTOLIC BLOOD PRESSURE: 120 MMHG | BODY MASS INDEX: 20.33 KG/M2

## 2024-10-03 DIAGNOSIS — D58.2 ELEVATED HEMOGLOBIN (HCC): ICD-10-CM

## 2024-10-03 DIAGNOSIS — F41.1 GENERALIZED ANXIETY DISORDER: Primary | ICD-10-CM

## 2024-10-03 DIAGNOSIS — Z23 NEED FOR INFLUENZA VACCINATION: ICD-10-CM

## 2024-10-03 DIAGNOSIS — Z72.0 TOBACCO USE: ICD-10-CM

## 2024-10-03 DIAGNOSIS — D12.0 ADENOMATOUS POLYP OF CECUM: ICD-10-CM

## 2024-10-03 DIAGNOSIS — R10.2 PELVIC PAIN: ICD-10-CM

## 2024-10-03 DIAGNOSIS — Z23 NEED FOR TDAP VACCINATION: ICD-10-CM

## 2024-10-03 DIAGNOSIS — E78.5 HYPERLIPIDEMIA, UNSPECIFIED HYPERLIPIDEMIA TYPE: ICD-10-CM

## 2024-10-03 DIAGNOSIS — D12.5 ADENOMATOUS POLYP OF SIGMOID COLON: ICD-10-CM

## 2024-10-03 LAB
BACTERIA URNS QL MICRO: ABNORMAL
BILIRUB UR QL STRIP: NEGATIVE
CASTS #/AREA URNS LPF: ABNORMAL /LPF (ref 0–8)
CLARITY UR: CLEAR
COLOR UR: YELLOW
EPI CELLS #/AREA URNS HPF: ABNORMAL /HPF (ref 0–5)
GLUCOSE UR STRIP-MCNC: NEGATIVE MG/DL
HGB UR QL STRIP.AUTO: NEGATIVE
KETONES UR STRIP-MCNC: NEGATIVE MG/DL
LEUKOCYTE ESTERASE UR QL STRIP: ABNORMAL
NITRITE UR QL STRIP: NEGATIVE
PH UR STRIP: 6.5 [PH] (ref 5–8)
PROT UR STRIP-MCNC: NEGATIVE MG/DL
RBC #/AREA URNS HPF: ABNORMAL /HPF (ref 0–4)
SP GR UR STRIP: 1.01 (ref 1–1.03)
UROBILINOGEN UR STRIP-ACNC: NORMAL EU/DL (ref 0–1)
WBC #/AREA URNS HPF: ABNORMAL /HPF (ref 0–5)

## 2024-10-03 PROCEDURE — 90715 TDAP VACCINE 7 YRS/> IM: CPT | Performed by: PEDIATRICS

## 2024-10-03 PROCEDURE — G8484 FLU IMMUNIZE NO ADMIN: HCPCS | Performed by: PEDIATRICS

## 2024-10-03 PROCEDURE — G8427 DOCREV CUR MEDS BY ELIG CLIN: HCPCS | Performed by: PEDIATRICS

## 2024-10-03 PROCEDURE — 90471 IMMUNIZATION ADMIN: CPT | Performed by: PEDIATRICS

## 2024-10-03 PROCEDURE — 99214 OFFICE O/P EST MOD 30 MIN: CPT | Performed by: PEDIATRICS

## 2024-10-03 PROCEDURE — 3017F COLORECTAL CA SCREEN DOC REV: CPT | Performed by: PEDIATRICS

## 2024-10-03 PROCEDURE — 4004F PT TOBACCO SCREEN RCVD TLK: CPT | Performed by: PEDIATRICS

## 2024-10-03 PROCEDURE — 90472 IMMUNIZATION ADMIN EACH ADD: CPT | Performed by: PEDIATRICS

## 2024-10-03 PROCEDURE — G8420 CALC BMI NORM PARAMETERS: HCPCS | Performed by: PEDIATRICS

## 2024-10-03 PROCEDURE — 90661 CCIIV3 VAC ABX FR 0.5 ML IM: CPT | Performed by: PEDIATRICS

## 2024-10-03 RX ORDER — ALPRAZOLAM 0.5 MG
0.5 TABLET ORAL DAILY PRN
Qty: 30 TABLET | Refills: 0 | Status: SHIPPED | OUTPATIENT
Start: 2024-10-03 | End: 2025-10-03

## 2024-10-03 RX ORDER — ALPRAZOLAM 0.5 MG
0.5 TABLET ORAL DAILY PRN
Qty: 30 TABLET | Refills: 0 | Status: CANCELLED | OUTPATIENT
Start: 2024-10-03 | End: 2025-10-03

## 2024-10-03 ASSESSMENT — ENCOUNTER SYMPTOMS
COUGH: 0
NAUSEA: 0
EYE DISCHARGE: 0
TROUBLE SWALLOWING: 0
SINUS PRESSURE: 0
DIARRHEA: 0
CHEST TIGHTNESS: 0
BACK PAIN: 0
VOMITING: 0
COLOR CHANGE: 0
SORE THROAT: 0
CONSTIPATION: 0
SHORTNESS OF BREATH: 0
WHEEZING: 0
EYE REDNESS: 0
RHINORRHEA: 0
EYE PAIN: 0

## 2024-10-03 ASSESSMENT — PATIENT HEALTH QUESTIONNAIRE - PHQ9
SUM OF ALL RESPONSES TO PHQ QUESTIONS 1-9: 0
SUM OF ALL RESPONSES TO PHQ QUESTIONS 1-9: 0
SUM OF ALL RESPONSES TO PHQ9 QUESTIONS 1 & 2: 0
1. LITTLE INTEREST OR PLEASURE IN DOING THINGS: NOT AT ALL
SUM OF ALL RESPONSES TO PHQ QUESTIONS 1-9: 0
2. FEELING DOWN, DEPRESSED OR HOPELESS: NOT AT ALL
SUM OF ALL RESPONSES TO PHQ QUESTIONS 1-9: 0

## 2024-10-03 NOTE — ASSESSMENT & PLAN NOTE
Orders:    ALPRAZolam (XANAX) 0.5 MG tablet; Take 1 tablet by mouth daily as needed for Sleep or Anxiety.

## 2024-10-06 LAB
MICROORGANISM SPEC CULT: ABNORMAL
SERVICE CMNT-IMP: ABNORMAL
SPECIMEN DESCRIPTION: ABNORMAL

## 2024-10-07 DIAGNOSIS — N39.0 URINARY TRACT INFECTION WITHOUT HEMATURIA, SITE UNSPECIFIED: Primary | ICD-10-CM

## 2024-10-07 RX ORDER — LEVOFLOXACIN 500 MG/1
500 TABLET, FILM COATED ORAL DAILY
Qty: 7 TABLET | Refills: 0 | Status: SHIPPED | OUTPATIENT
Start: 2024-10-07 | End: 2024-10-14

## 2024-10-14 ENCOUNTER — OFFICE VISIT (OUTPATIENT)
Dept: PRIMARY CARE CLINIC | Age: 49
End: 2024-10-14
Payer: COMMERCIAL

## 2024-10-14 ENCOUNTER — TELEPHONE (OUTPATIENT)
Dept: FAMILY MEDICINE CLINIC | Age: 49
End: 2024-10-14

## 2024-10-14 VITALS
SYSTOLIC BLOOD PRESSURE: 120 MMHG | DIASTOLIC BLOOD PRESSURE: 78 MMHG | BODY MASS INDEX: 20.33 KG/M2 | HEIGHT: 65 IN | OXYGEN SATURATION: 97 % | HEART RATE: 76 BPM | WEIGHT: 122 LBS

## 2024-10-14 DIAGNOSIS — M79.662 PAIN OF LEFT CALF: Primary | ICD-10-CM

## 2024-10-14 PROCEDURE — G8484 FLU IMMUNIZE NO ADMIN: HCPCS | Performed by: NURSE PRACTITIONER

## 2024-10-14 PROCEDURE — 3017F COLORECTAL CA SCREEN DOC REV: CPT | Performed by: NURSE PRACTITIONER

## 2024-10-14 PROCEDURE — 4004F PT TOBACCO SCREEN RCVD TLK: CPT | Performed by: NURSE PRACTITIONER

## 2024-10-14 PROCEDURE — G8420 CALC BMI NORM PARAMETERS: HCPCS | Performed by: NURSE PRACTITIONER

## 2024-10-14 PROCEDURE — G8427 DOCREV CUR MEDS BY ELIG CLIN: HCPCS | Performed by: NURSE PRACTITIONER

## 2024-10-14 PROCEDURE — 99213 OFFICE O/P EST LOW 20 MIN: CPT | Performed by: NURSE PRACTITIONER

## 2024-10-14 ASSESSMENT — ENCOUNTER SYMPTOMS
SHORTNESS OF BREATH: 0
TROUBLE SWALLOWING: 0
ABDOMINAL PAIN: 0
CHEST TIGHTNESS: 0
RHINORRHEA: 0
BACK PAIN: 0
COUGH: 0
COLOR CHANGE: 0
VOMITING: 0

## 2024-10-14 NOTE — PROGRESS NOTES
[facility]YSIC[facility] WALK IN  [address] [place] [id number]  Dept: [phone]    Rula Gayle is a 49 y.o. female Established patient, who presents to the walk-in clinic today with conditions/complaints as noted below:    Chief Complaint   Patient presents with    Leg Pain         HPI:     Patient presents to walk-in clinic with concerns about acute left calf pain.  Pain started yesterday as she was walking around [employer].  Denies injury or trauma.  No redness or swelling to calf.  Denies chest pain or shortness of breath.  She is a smoker.        Past Medical History:   Diagnosis Date    Anxiety     Arthritis     COPD (chronic obstructive pulmonary disease) ([facility])     Depression     Kidney stone 2019    Lumbago        Current Outpatient Medications   Medication Sig Dispense Refill    levoFLOXacin (LEVAQUIN) 500 MG tablet Take 1 tablet by mouth daily for 7 days 7 tablet 0    ALPRAZolam (XANAX) 0.5 MG tablet Take 1 tablet by mouth daily as needed for Sleep or Anxiety. 30 tablet 0    Cholecalciferol (VITAMIN D3) 50 MCG (2000 UT) CAPS Take 1 capsule by mouth daily (Patient not taking: Reported on 10/3/2024) 30 capsule 5    ibuprofen (ADVIL;MOTRIN) 600 MG tablet Take 1 tablet by mouth every 6 hours as needed for Pain (Patient not taking: Reported on 10/3/2024) 120 tablet 0    budesonide-formoterol (SYMBICORT) 160-4.5 MCG/ACT AERO Inhale 2 puffs into the lungs 2 times daily (Patient not taking: Reported on 3/29/2024) 10.2 g 3    albuterol (PROVENTIL) (2.5 MG/3ML) 0.083% nebulizer solution Take 3 mLs by nebulization every 6 hours as needed for Wheezing or Shortness of Breath 120 each 1    cyanocobalamin 1000 MCG tablet Take 1 tablet by mouth daily (Patient not taking: Reported on 3/29/2024) 30 tablet 5     No current facility-administered medications for this visit.       No Known Allergies    :     Review of Systems   Constitutional:  Positive

## 2024-10-14 NOTE — TELEPHONE ENCOUNTER
Patient contacted the office today because she thinks she may have a DVT. Patient states that her left leg is hot to the touch and slightly swollen. Patient was advised to be evaluated.  Patient was offered 12 PM today with Jay but was unable to make it. Patient was advised of the walk in clinic that is open until 6 PM tonight. Patient [facility] couldn't leave [facility]

## 2024-10-15 DIAGNOSIS — M79.662 PAIN OF LEFT CALF: ICD-10-CM

## 2024-11-02 DIAGNOSIS — F41.1 GENERALIZED ANXIETY DISORDER: ICD-10-CM

## 2024-11-04 DIAGNOSIS — R35.0 URINARY FREQUENCY: ICD-10-CM

## 2024-11-04 LAB
AMORPHOUS URATE CRYSTALS, UR: ABNORMAL /HPF
BACTERIA, URINE: ABNORMAL /HPF
BILIRUBIN, URINE: ABNORMAL
CHARACTER, URINE: ABNORMAL
COLOR, UA: ABNORMAL
GLUCOSE URINE: ABNORMAL MG/DL
KETONES, URINE: ABNORMAL MG/DL
LEUKOCYTE ESTERASE, URINE: ABNORMAL
MICROALBUMIN/CREAT 24H UR: ABNORMAL MG/DL
MUCUS, URINE: ABNORMAL /HPF
NITRITE, URINE: ABNORMAL
OCCULT BLOOD,URINE: ABNORMAL
PH, URINE: 7.5
RBC URINE: ABNORMAL /HPF
SPECIFIC GRAVITY UA: 1.02
SQUAMOUS EPITHELIAL CELLS: ABNORMAL /HPF
URINE CULTURE, ROUTINE: NORMAL STATUS
UROBILINOGEN, URINE: 0.2 MG/DL
WBC URINE: ABNORMAL /HPF

## 2024-11-04 NOTE — TELEPHONE ENCOUNTER
LOV 10/3/2024   RTO 1/10/25  LRF 10/3/24          Controlled Substance Monitoring:    Acute and Chronic Pain Monitoring:   RX Monitoring Periodic Controlled Substance Monitoring   9/4/2024   6:21 PM No signs of potential drug abuse or diversion identified.

## 2024-11-05 RX ORDER — ALPRAZOLAM 0.5 MG
0.5 TABLET ORAL DAILY PRN
Qty: 30 TABLET | Refills: 0 | Status: SHIPPED | OUTPATIENT
Start: 2024-11-05 | End: 2025-11-05

## 2024-12-03 DIAGNOSIS — F41.1 GENERALIZED ANXIETY DISORDER: ICD-10-CM

## 2024-12-03 RX ORDER — ALPRAZOLAM 0.5 MG
0.5 TABLET ORAL DAILY PRN
Qty: 30 TABLET | Refills: 0 | Status: SHIPPED | OUTPATIENT
Start: 2024-12-03 | End: 2025-12-03

## 2024-12-03 NOTE — TELEPHONE ENCOUNTER
LOV  10/3/2024  RTO 1/10/25  LRF 11/5/2024          Controlled Substance Monitoring:    Acute and Chronic Pain Monitoring:   RX Monitoring Periodic Controlled Substance Monitoring   11/5/2024  11:17 PM No signs of potential drug abuse or diversion identified.

## 2025-01-02 DIAGNOSIS — F41.1 GENERALIZED ANXIETY DISORDER: ICD-10-CM

## 2025-01-02 NOTE — TELEPHONE ENCOUNTER
LOV 10/3/24   RTO 1/10/25  LRF 12/3/24          Controlled Substance Monitoring:    Acute and Chronic Pain Monitoring:   RX Monitoring Periodic Controlled Substance Monitoring   12/3/2024   3:34 PM No signs of potential drug abuse or diversion identified.

## 2025-01-03 RX ORDER — ALPRAZOLAM 0.5 MG
0.5 TABLET ORAL DAILY PRN
Qty: 30 TABLET | Refills: 0 | Status: SHIPPED | OUTPATIENT
Start: 2025-01-03 | End: 2026-01-03

## 2025-01-10 ENCOUNTER — OFFICE VISIT (OUTPATIENT)
Dept: FAMILY MEDICINE CLINIC | Age: 50
End: 2025-01-10

## 2025-01-10 VITALS
HEART RATE: 74 BPM | OXYGEN SATURATION: 98 % | DIASTOLIC BLOOD PRESSURE: 80 MMHG | BODY MASS INDEX: 20.33 KG/M2 | HEIGHT: 65 IN | TEMPERATURE: 98.4 F | WEIGHT: 122 LBS | SYSTOLIC BLOOD PRESSURE: 124 MMHG

## 2025-01-10 DIAGNOSIS — D58.2 ELEVATED HEMOGLOBIN (HCC): ICD-10-CM

## 2025-01-10 DIAGNOSIS — Z72.0 TOBACCO USE: ICD-10-CM

## 2025-01-10 DIAGNOSIS — G89.29 CHRONIC BILATERAL LOW BACK PAIN WITHOUT SCIATICA: ICD-10-CM

## 2025-01-10 DIAGNOSIS — L81.9 DISCOLORATION OF SKIN OF FACE: ICD-10-CM

## 2025-01-10 DIAGNOSIS — M54.50 CHRONIC BILATERAL LOW BACK PAIN WITHOUT SCIATICA: ICD-10-CM

## 2025-01-10 DIAGNOSIS — D48.7 NEOPLASM OF UNCERTAIN BEHAVIOR OF LOWER LEG: ICD-10-CM

## 2025-01-10 DIAGNOSIS — Z87.442 HISTORY OF KIDNEY STONES: ICD-10-CM

## 2025-01-10 DIAGNOSIS — D12.0 ADENOMATOUS POLYP OF CECUM: ICD-10-CM

## 2025-01-10 DIAGNOSIS — D12.5 ADENOMATOUS POLYP OF SIGMOID COLON: ICD-10-CM

## 2025-01-10 DIAGNOSIS — K62.5 BRBPR (BRIGHT RED BLOOD PER RECTUM): ICD-10-CM

## 2025-01-10 DIAGNOSIS — F41.1 GENERALIZED ANXIETY DISORDER: Primary | ICD-10-CM

## 2025-01-10 DIAGNOSIS — E78.5 HYPERLIPIDEMIA, UNSPECIFIED HYPERLIPIDEMIA TYPE: ICD-10-CM

## 2025-01-10 DIAGNOSIS — M54.9 MID BACK PAIN: ICD-10-CM

## 2025-01-10 DIAGNOSIS — J44.9 CHRONIC OBSTRUCTIVE PULMONARY DISEASE, UNSPECIFIED COPD TYPE (HCC): ICD-10-CM

## 2025-01-10 RX ORDER — CYCLOBENZAPRINE HCL 5 MG
5 TABLET ORAL 3 TIMES DAILY PRN
Qty: 30 TABLET | Refills: 0 | Status: SHIPPED | OUTPATIENT
Start: 2025-01-10 | End: 2025-01-20

## 2025-01-10 SDOH — ECONOMIC STABILITY: FOOD INSECURITY: WITHIN THE PAST 12 MONTHS, THE FOOD YOU BOUGHT JUST DIDN'T LAST AND YOU DIDN'T HAVE MONEY TO GET MORE.: NEVER TRUE

## 2025-01-10 SDOH — ECONOMIC STABILITY: FOOD INSECURITY: WITHIN THE PAST 12 MONTHS, YOU WORRIED THAT YOUR FOOD WOULD RUN OUT BEFORE YOU GOT MONEY TO BUY MORE.: NEVER TRUE

## 2025-01-10 ASSESSMENT — PATIENT HEALTH QUESTIONNAIRE - PHQ9
SUM OF ALL RESPONSES TO PHQ QUESTIONS 1-9: 0
SUM OF ALL RESPONSES TO PHQ9 QUESTIONS 1 & 2: 0
1. LITTLE INTEREST OR PLEASURE IN DOING THINGS: NOT AT ALL
2. FEELING DOWN, DEPRESSED OR HOPELESS: NOT AT ALL
SUM OF ALL RESPONSES TO PHQ QUESTIONS 1-9: 0

## 2025-01-10 NOTE — PROGRESS NOTES
Rula Gayle (:  1975) is a 49 y.o. female,Established patient, here for evaluation of the following chief complaint(s):    Anxiety         Assessment & Plan  Generalized anxiety disorder            Adenomatous polyp of cecum            Adenomatous polyp of sigmoid colon            BRBPR (bright red blood per rectum)       Orders:    Iron and TIBC; Future    Ferritin; Future    CBC    Iron Profile with Ferritin    Hyperlipidemia, unspecified hyperlipidemia type       Orders:    Comprehensive Metabolic Panel    Lipid Panel    Elevated hemoglobin (HCC)       Orders:    CBC    Tobacco use            Chronic obstructive pulmonary disease, unspecified COPD type (HCC)            Chronic bilateral low back pain without sciatica       Orders:    cyclobenzaprine (FLEXERIL) 5 MG tablet; Take 1 tablet by mouth 3 times daily as needed for Muscle spasms    Mid back pain       Orders:    cyclobenzaprine (FLEXERIL) 5 MG tablet; Take 1 tablet by mouth 3 times daily as needed for Muscle spasms    History of kidney stones            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       Orders:    Ramu Mccauley PA, Dermatology, [place]    Discoloration of skin of face       Orders:    Ramu Mccauley PA, Dermatology, [place]       Continue Xanax as needed  Consider prophylactic controller medication if anxiety symptoms worsen  UDS and CSM are up to date  Repeat colonoscopy was due in 2024 - she is now having some BRBPR so I advised her to follow up with GI as soon as possible  Routine fasting lipids due now  Repeat CBC due now and in light of BRBPR this is more important to be done now with iron levels   Smoking cessation encouraged  Limit anti-inflammatory use  Trial of flexeril for back pain   Urgent dermatology consult for evaluation of right leg lesion and facial lesions  COVID-19 vaccine recommended  Schedule Pap  Call with concerns        Return in about 3 months (around 4/10/2025) for routine

## 2025-01-14 ENCOUNTER — OFFICE VISIT (OUTPATIENT)
Age: 50
End: 2025-01-14
Payer: COMMERCIAL

## 2025-01-14 VITALS
DIASTOLIC BLOOD PRESSURE: 79 MMHG | BODY MASS INDEX: 20.49 KG/M2 | HEART RATE: 85 BPM | SYSTOLIC BLOOD PRESSURE: 118 MMHG | TEMPERATURE: 97.2 F | WEIGHT: 123 LBS | HEIGHT: 65 IN

## 2025-01-14 DIAGNOSIS — L40.9 PSORIASIS: Primary | ICD-10-CM

## 2025-01-14 DIAGNOSIS — L82.1 SEBORRHEIC KERATOSES: ICD-10-CM

## 2025-01-14 LAB
ALBUMIN: 4.3 G/DL
ALK PHOSPHATASE: 90 U/L
ALT SERPL-CCNC: 18 U/L
AST SERPL-CCNC: 22 U/L
BASOPHILS ABSOLUTE: NORMAL
BASOPHILS RELATIVE PERCENT: NORMAL
BILIRUB SERPL-MCNC: 0.6 MG/DL
BUN BLDV-MCNC: 16 MG/DL
CALCIUM SERPL-MCNC: 9.8 MG/DL
CHLORIDE BLD-SCNC: 106 MMOL/L
CHOLESTEROL, TOTAL: 210 MG/DL
CHOLESTEROL/HDL RATIO: 3.6
CO2: 27 MMOL/L
CREAT SERPL-MCNC: 0.64 MG/DL
EGFR (CKD-EPI): 107.6 ML/M1.7
EOSINOPHILS ABSOLUTE: NORMAL
EOSINOPHILS RELATIVE PERCENT: NORMAL
ERYTHROCYTE [DISTWIDTH] IN BLOOD BY AUTOMATED COUNT: 42.7 FL
FERRITIN: 55.4 NG/ML
GLUCOSE: 104 MG/DL
HCT VFR BLD CALC: 43.4 %
HCT VFR BLD CALC: 43.4 % (ref 36–46)
HDLC SERPL-MCNC: 58 MG/DL
HEMOGLOBIN: 14.3 G/DL
HEMOGLOBIN: 14.3 G/DL (ref 12–16)
IRON % SATURATION: 54 %
IRON: 157 UG/DL
LDL CHOLESTEROL: 126 MG/DL
LYMPHOCYTES ABSOLUTE: NORMAL
LYMPHOCYTES RELATIVE PERCENT: NORMAL
MCH RBC QN AUTO: 29.7 PG
MCH RBC QN AUTO: 29.7 PG
MCHC RBC AUTO-ENTMCNC: 32.9 G/DL
MCHC RBC AUTO-ENTMCNC: 32.9 G/DL
MCV RBC AUTO: 90.2 FL
MCV RBC AUTO: 90.2 FL
MONOCYTES ABSOLUTE: NORMAL
MONOCYTES RELATIVE PERCENT: NORMAL
NEUTROPHILS ABSOLUTE: NORMAL
NEUTROPHILS RELATIVE PERCENT: NORMAL
PLATELET # BLD: 355 K/ΜL
PLATELET # BLD: 355 X10^3UL
PMV BLD AUTO: NORMAL FL
POTASSIUM SERPL-SCNC: 3.9 MMOL/L
RBC # BLD: 4.81 10^6/ΜL
RBC # BLD: 4.81 X10^6UL
SODIUM BLD-SCNC: 138 MMOL/L
TOTAL IRON BINDING CAPACITY: 290 UG/DL
TOTAL PROTEIN: 7 G/DL
TRIGL SERPL-MCNC: 132 MG/DL
TSH SERPL DL<=0.05 MIU/L-ACNC: 1.14 MIU/L
VITAMIN B-12: 415 PG/ML
VITAMIN D 25-HYDROXY: 25.5 NG/ML
VLDLC SERPL CALC-MCNC: 26 MG/DL
WBC # BLD: 6.95 10^3/ML
WBC # BLD: 6.95 X10^3UL

## 2025-01-14 PROCEDURE — G8420 CALC BMI NORM PARAMETERS: HCPCS | Performed by: PHYSICIAN ASSISTANT

## 2025-01-14 PROCEDURE — 99204 OFFICE O/P NEW MOD 45 MIN: CPT | Performed by: PHYSICIAN ASSISTANT

## 2025-01-14 PROCEDURE — G8427 DOCREV CUR MEDS BY ELIG CLIN: HCPCS | Performed by: PHYSICIAN ASSISTANT

## 2025-01-14 PROCEDURE — 4004F PT TOBACCO SCREEN RCVD TLK: CPT | Performed by: PHYSICIAN ASSISTANT

## 2025-01-14 PROCEDURE — 3017F COLORECTAL CA SCREEN DOC REV: CPT | Performed by: PHYSICIAN ASSISTANT

## 2025-01-14 RX ORDER — CLOBETASOL PROPIONATE 0.5 MG/G
OINTMENT TOPICAL
Qty: 60 G | Refills: 1 | Status: SHIPPED | OUTPATIENT
Start: 2025-01-14

## 2025-01-14 NOTE — PROGRESS NOTES
chills, and malaise.    PHYSICAL EXAM:   /79   Pulse 85   Temp 97.2 °F (36.2 °C)   Ht 1.651 m (5' 5\")   Wt 55.8 kg (123 lb)   LMP 04/01/2023   BMI 20.47 kg/m²     The patient is generally well appearing, well nourished, alert and conversational. Affect is normal.    Cutaneous Exam:  Physical Exam  Sun exposed + limited LEs: Head/face,neck, both arms, digits and/or nails and legs visible with pants/shorts and shoes/socks on was examined.    Facial covering was removed during examination.    Diagnoses/exam findings/medical history pertinent to this visit are listed below:    Assessment:   Diagnosis Orders   1. Psoriasis  clobetasol (TEMOVATE) 0.05 % ointment      2. Seborrheic keratoses             Plan:  1. Psoriasis (right lower leg, mild on elbows)  - chronic illness with progression and/or exacerbation   - clobetasol (TEMOVATE) 0.05 % ointment; Apply to plaque on right lower leg twice daily x 2 weeks, then twice daily as needed.  Do NOT use on face, groin, armpits, or under breasts.  Dispense: 60 g; Refill: 1    2. Seborrheic keratoses  - reassurance and education       RTC 1Y    Future Appointments   Date Time Provider Department Center   4/10/2025  3:00 PM Melissa Jacome MD [facility]   1/14/2026  3:00 PM Ramu Muñoz PA-C [facility]         There are no Patient Instructions on file for this visit.      Electronically signed by Ramu Muñoz PA-C on 1/14/25 at 3:43 PM EST

## 2025-01-15 DIAGNOSIS — D58.2 ELEVATED HEMOGLOBIN (HCC): ICD-10-CM

## 2025-01-15 DIAGNOSIS — N20.0 NEPHROLITHIASIS: ICD-10-CM

## 2025-01-15 DIAGNOSIS — R79.89 LOW VITAMIN B12 LEVEL: ICD-10-CM

## 2025-01-15 DIAGNOSIS — K62.5 BRBPR (BRIGHT RED BLOOD PER RECTUM): ICD-10-CM

## 2025-01-15 DIAGNOSIS — R53.83 OTHER FATIGUE: ICD-10-CM

## 2025-01-15 DIAGNOSIS — R79.89 LOW VITAMIN D LEVEL: ICD-10-CM

## 2025-01-15 DIAGNOSIS — J44.9 CHRONIC OBSTRUCTIVE PULMONARY DISEASE, UNSPECIFIED COPD TYPE (HCC): ICD-10-CM

## 2025-01-15 DIAGNOSIS — E78.5 HYPERLIPIDEMIA, UNSPECIFIED HYPERLIPIDEMIA TYPE: ICD-10-CM

## 2025-01-21 DIAGNOSIS — R79.89 LOW VITAMIN D LEVEL: Primary | ICD-10-CM

## 2025-01-22 RX ORDER — ACETAMINOPHEN 160 MG
2000 TABLET,DISINTEGRATING ORAL DAILY
Qty: 30 CAPSULE | Refills: 5 | Status: SHIPPED | OUTPATIENT
Start: 2025-01-22 | End: 2026-01-22

## 2025-01-22 NOTE — TELEPHONE ENCOUNTER
LOV 1/10/25   RTO 4/10/25  LRF 12/6/23          Controlled Substance Monitoring:    Acute and Chronic Pain Monitoring:   RX Monitoring Periodic Controlled Substance Monitoring   1/3/2025  12:04 AM No signs of potential drug abuse or diversion identified.

## 2025-01-24 ENCOUNTER — TELEPHONE (OUTPATIENT)
Dept: FAMILY MEDICINE CLINIC | Age: 50
End: 2025-01-24

## 2025-01-24 DIAGNOSIS — D12.5 ADENOMATOUS POLYP OF SIGMOID COLON: ICD-10-CM

## 2025-01-24 DIAGNOSIS — D12.0 ADENOMATOUS POLYP OF CECUM: Primary | ICD-10-CM

## 2025-01-24 DIAGNOSIS — K62.5 BRBPR (BRIGHT RED BLOOD PER RECTUM): ICD-10-CM

## 2025-01-24 NOTE — TELEPHONE ENCOUNTER
Patient calls in today and would like a referral to Dr. Caceres.  Fax:  [phone]  For colonoscopy.

## 2025-01-27 NOTE — TELEPHONE ENCOUNTER
Referral signed.  Please fax with my last note explaining need for colonoscopy ASAP.      Ensure the office gets the information and gets her scheduled.

## 2025-02-01 DIAGNOSIS — F41.1 GENERALIZED ANXIETY DISORDER: ICD-10-CM

## 2025-02-03 NOTE — TELEPHONE ENCOUNTER
LOV 1/10/2025   RTO 4/10/25  LRF 1/3/2025          Controlled Substance Monitoring:    Acute and Chronic Pain Monitoring:   RX Monitoring Periodic Controlled Substance Monitoring   1/3/2025  12:04 AM No signs of potential drug abuse or diversion identified.

## 2025-02-04 RX ORDER — ALPRAZOLAM 0.5 MG
0.5 TABLET ORAL DAILY PRN
Qty: 30 TABLET | Refills: 0 | Status: SHIPPED | OUTPATIENT
Start: 2025-02-04 | End: 2026-02-04

## 2025-02-05 DIAGNOSIS — Z91.89 AT HIGH RISK FOR BREAST CANCER: ICD-10-CM

## 2025-02-05 DIAGNOSIS — R92.343 EXTREMELY DENSE TISSUE OF BOTH BREASTS ON MAMMOGRAPHY: ICD-10-CM

## 2025-03-03 DIAGNOSIS — F41.1 GENERALIZED ANXIETY DISORDER: ICD-10-CM

## 2025-03-03 NOTE — TELEPHONE ENCOUNTER
LOV 1/10/25   RTO 4/10/25  LRF 2/4/25          Controlled Substance Monitoring:    Acute and Chronic Pain Monitoring:   RX Monitoring Periodic Controlled Substance Monitoring   2/4/2025  11:45 PM No signs of potential drug abuse or diversion identified.

## 2025-03-04 RX ORDER — ALPRAZOLAM 0.5 MG
0.5 TABLET ORAL DAILY PRN
Qty: 30 TABLET | Refills: 0 | Status: SHIPPED | OUTPATIENT
Start: 2025-03-04 | End: 2026-03-04

## 2025-03-24 ENCOUNTER — TELEPHONE (OUTPATIENT)
Dept: FAMILY MEDICINE CLINIC | Age: 50
End: 2025-03-24

## 2025-03-24 NOTE — TELEPHONE ENCOUNTER
Patient is calling for an update on her paperwork that she dropped off on Thursday. Writer did advise of the 14 day policy for all paperwork to be signed and returned. Patient expressed understanding of policy.     Patient states that you can send a My chart message to her letting her know the status of the paperwork and can also attach the paperwork into that message.

## 2025-04-03 DIAGNOSIS — F41.1 GENERALIZED ANXIETY DISORDER: ICD-10-CM

## 2025-04-04 RX ORDER — ALPRAZOLAM 0.5 MG
0.5 TABLET ORAL DAILY PRN
Qty: 30 TABLET | Refills: 0 | Status: SHIPPED | OUTPATIENT
Start: 2025-04-04 | End: 2026-04-04

## 2025-04-04 NOTE — TELEPHONE ENCOUNTER
LOV 1/10/25   RTO 4/10/25  LRF 3/4/25          Controlled Substance Monitoring:    Acute and Chronic Pain Monitoring:   RX Monitoring Periodic Controlled Substance Monitoring   3/4/2025   9:41 AM No signs of potential drug abuse or diversion identified.

## 2025-04-10 ENCOUNTER — OFFICE VISIT (OUTPATIENT)
Dept: FAMILY MEDICINE CLINIC | Age: 50
End: 2025-04-10

## 2025-04-10 VITALS
HEART RATE: 60 BPM | BODY MASS INDEX: 20.66 KG/M2 | DIASTOLIC BLOOD PRESSURE: 80 MMHG | SYSTOLIC BLOOD PRESSURE: 128 MMHG | TEMPERATURE: 98.2 F | WEIGHT: 124 LBS | HEIGHT: 65 IN | OXYGEN SATURATION: 100 %

## 2025-04-10 DIAGNOSIS — J44.9 CHRONIC OBSTRUCTIVE PULMONARY DISEASE, UNSPECIFIED COPD TYPE (HCC): ICD-10-CM

## 2025-04-10 DIAGNOSIS — F41.1 GENERALIZED ANXIETY DISORDER: Primary | ICD-10-CM

## 2025-04-10 DIAGNOSIS — Z12.31 BREAST CANCER SCREENING BY MAMMOGRAM: ICD-10-CM

## 2025-04-10 DIAGNOSIS — E55.9 VITAMIN D INSUFFICIENCY: ICD-10-CM

## 2025-04-10 DIAGNOSIS — M54.50 CHRONIC BILATERAL LOW BACK PAIN WITHOUT SCIATICA: ICD-10-CM

## 2025-04-10 DIAGNOSIS — L40.9 PSORIASIS: ICD-10-CM

## 2025-04-10 DIAGNOSIS — M54.9 MID BACK PAIN: ICD-10-CM

## 2025-04-10 DIAGNOSIS — E78.5 HYPERLIPIDEMIA, UNSPECIFIED HYPERLIPIDEMIA TYPE: ICD-10-CM

## 2025-04-10 DIAGNOSIS — G89.29 CHRONIC BILATERAL LOW BACK PAIN WITHOUT SCIATICA: ICD-10-CM

## 2025-04-10 DIAGNOSIS — Z87.442 HISTORY OF KIDNEY STONES: ICD-10-CM

## 2025-04-10 DIAGNOSIS — D58.2 ELEVATED HEMOGLOBIN: ICD-10-CM

## 2025-04-10 DIAGNOSIS — D12.0 ADENOMATOUS POLYP OF CECUM: ICD-10-CM

## 2025-04-10 DIAGNOSIS — Z72.0 TOBACCO USE: ICD-10-CM

## 2025-04-10 NOTE — PROGRESS NOTES
Rula Gayle (:  1975) is a 50 y.o. female,Established patient, here for evaluation of the following chief complaint(s):    Anxiety         Assessment & Plan  Generalized anxiety disorder            Hyperlipidemia, unspecified hyperlipidemia type            Elevated hemoglobin            Tobacco use            Chronic obstructive pulmonary disease, unspecified COPD type (HCC)            Chronic bilateral low back pain without sciatica            Mid back pain            History of kidney stones            Adenomatous polyp of cecum            Psoriasis            Vitamin D insufficiency            Breast cancer screening by mammogram       Orders:    DANA BOGDAN DIGITAL SCREEN BILATERAL; Future        Continue Xanax as needed  Consider prophylactic controller medication if anxiety symptoms worsen  UDS and CSM are up to date  Routine fasting labs in 2025  Smoking cessation encouraged  Limit anti-inflammatory use / tylenol as needed  Trial of flexeril for back pain   Monitor for kidney stones   Repeat colonoscopy in   Follow up with dermatology when instructed   Take vitamin D supplement regularly   Obtain mammogram   Schedule Pap  COVID-19 vaccine recommended  Call with concerns          Return in about 3 months (around 7/10/2025) for routine follow up.       Subjective     HPI    Patient presents today for routine follow-up of anxiety.  She does have a history of generalized anxiety disorder and uses Xanax as needed.  She reports this controls her symptoms well.  She does not feel that she needs any type of preventative treatment.  She denies any side effects from Xanax.  Her UDS and CSM are up-to-date.  She does use an occasional THC gummy for anxiety or sleep.  She does have a history of hyperlipidemia not treated with medication.    Previous blood work did show a mildly elevated hemoglobin and hematocrit likely secondary to hemoconcentration from fasting and her history of tobacco use.   She

## 2025-04-19 ASSESSMENT — ENCOUNTER SYMPTOMS
ABDOMINAL PAIN: 0
DIARRHEA: 0
RHINORRHEA: 0
EYE DISCHARGE: 0
TROUBLE SWALLOWING: 0
CONSTIPATION: 0
BLOOD IN STOOL: 0
EYE REDNESS: 0
NAUSEA: 0
BACK PAIN: 1
CHEST TIGHTNESS: 0
SHORTNESS OF BREATH: 0
COUGH: 0
SORE THROAT: 0
VOMITING: 0
WHEEZING: 0
SINUS PRESSURE: 0
COLOR CHANGE: 0
EYE PAIN: 0

## 2025-05-02 DIAGNOSIS — F41.1 GENERALIZED ANXIETY DISORDER: ICD-10-CM

## 2025-05-05 NOTE — TELEPHONE ENCOUNTER
LOV 4/10/25   RTO 7/14/25  LRF 4/4/25          Controlled Substance Monitoring:    Acute and Chronic Pain Monitoring:   RX Monitoring Periodic Controlled Substance Monitoring   3/4/2025   9:41 AM No signs of potential drug abuse or diversion identified.

## 2025-05-06 RX ORDER — ALPRAZOLAM 0.5 MG
0.5 TABLET ORAL DAILY PRN
Qty: 30 TABLET | Refills: 0 | Status: SHIPPED | OUTPATIENT
Start: 2025-05-06 | End: 2026-05-06

## 2025-06-04 DIAGNOSIS — F41.1 GENERALIZED ANXIETY DISORDER: ICD-10-CM

## 2025-06-04 RX ORDER — ALPRAZOLAM 0.5 MG
0.5 TABLET ORAL DAILY PRN
Qty: 30 TABLET | Refills: 0 | Status: SHIPPED | OUTPATIENT
Start: 2025-06-04 | End: 2026-06-04

## 2025-06-04 NOTE — TELEPHONE ENCOUNTER
LOV 04/10/2025   RTO 07/14/2025  LRF 05/06/2025          Controlled Substance Monitoring:    Acute and Chronic Pain Monitoring:   RX Monitoring Periodic Controlled Substance Monitoring   3/4/2025   9:41 AM No signs of potential drug abuse or diversion identified.

## 2025-07-03 DIAGNOSIS — F41.1 GENERALIZED ANXIETY DISORDER: ICD-10-CM

## 2025-07-03 NOTE — TELEPHONE ENCOUNTER
LOV 4/10/25   RTO 7/14/25  LRF 6/4/25          Controlled Substance Monitoring:    Acute and Chronic Pain Monitoring:   RX Monitoring Periodic Controlled Substance Monitoring   6/4/2025   4:02 PM No signs of potential drug abuse or diversion identified.

## 2025-07-04 RX ORDER — ALPRAZOLAM 0.5 MG
0.5 TABLET ORAL DAILY PRN
Qty: 30 TABLET | Refills: 0 | Status: SHIPPED | OUTPATIENT
Start: 2025-07-04 | End: 2026-07-04

## 2025-07-14 ENCOUNTER — OFFICE VISIT (OUTPATIENT)
Dept: FAMILY MEDICINE CLINIC | Age: 50
End: 2025-07-14

## 2025-07-14 ENCOUNTER — HOSPITAL ENCOUNTER (OUTPATIENT)
Age: 50
Setting detail: SPECIMEN
Discharge: HOME | End: 2025-07-14

## 2025-07-14 VITALS
SYSTOLIC BLOOD PRESSURE: 120 MMHG | DIASTOLIC BLOOD PRESSURE: 76 MMHG | WEIGHT: 120 LBS | TEMPERATURE: 98.3 F | HEIGHT: 65 IN | BODY MASS INDEX: 19.99 KG/M2 | OXYGEN SATURATION: 95 % | HEART RATE: 76 BPM

## 2025-07-14 DIAGNOSIS — Z12.39 ENCOUNTER FOR SCREENING BREAST EXAMINATION AND DISCUSSION OF BREAST SELF EXAMINATION: ICD-10-CM

## 2025-07-14 DIAGNOSIS — J44.9 CHRONIC OBSTRUCTIVE PULMONARY DISEASE, UNSPECIFIED COPD TYPE (HCC): ICD-10-CM

## 2025-07-14 DIAGNOSIS — Z72.0 TOBACCO USE: ICD-10-CM

## 2025-07-14 DIAGNOSIS — E53.8 VITAMIN B 12 DEFICIENCY: ICD-10-CM

## 2025-07-14 DIAGNOSIS — E78.5 HYPERLIPIDEMIA, UNSPECIFIED HYPERLIPIDEMIA TYPE: ICD-10-CM

## 2025-07-14 DIAGNOSIS — Z51.81 THERAPEUTIC DRUG MONITORING: ICD-10-CM

## 2025-07-14 DIAGNOSIS — M54.9 MID BACK PAIN: ICD-10-CM

## 2025-07-14 DIAGNOSIS — L40.9 PSORIASIS: ICD-10-CM

## 2025-07-14 DIAGNOSIS — M54.50 CHRONIC BILATERAL LOW BACK PAIN WITHOUT SCIATICA: ICD-10-CM

## 2025-07-14 DIAGNOSIS — D58.2 ELEVATED HEMOGLOBIN: ICD-10-CM

## 2025-07-14 DIAGNOSIS — D12.0 ADENOMATOUS POLYP OF CECUM: ICD-10-CM

## 2025-07-14 DIAGNOSIS — Z01.419 WELL WOMAN EXAM WITH ROUTINE GYNECOLOGICAL EXAM: Primary | ICD-10-CM

## 2025-07-14 DIAGNOSIS — Z12.89 ENCOUNTER FOR PELVIC SCREENING FOR CANCER: ICD-10-CM

## 2025-07-14 DIAGNOSIS — R53.83 OTHER FATIGUE: ICD-10-CM

## 2025-07-14 DIAGNOSIS — G89.29 CHRONIC BILATERAL LOW BACK PAIN WITHOUT SCIATICA: ICD-10-CM

## 2025-07-14 DIAGNOSIS — E55.9 VITAMIN D INSUFFICIENCY: ICD-10-CM

## 2025-07-14 DIAGNOSIS — F41.1 GENERALIZED ANXIETY DISORDER: ICD-10-CM

## 2025-07-14 DIAGNOSIS — R41.89 BRAIN FOG: ICD-10-CM

## 2025-07-14 DIAGNOSIS — Z87.442 HISTORY OF KIDNEY STONES: ICD-10-CM

## 2025-07-14 DIAGNOSIS — R73.01 IMPAIRED FASTING BLOOD SUGAR: ICD-10-CM

## 2025-07-14 LAB
ALCOHOL URINE: NORMAL
AMPHETAMINE SCREEN URINE: NORMAL
BARBITURATE SCREEN URINE: NORMAL
BENZODIAZEPINE SCREEN, URINE: POSITIVE
BUPRENORPHINE URINE: NORMAL
COCAINE METABOLITE SCREEN URINE: NORMAL
FENTANYL SCREEN, URINE: NORMAL
GABAPENTIN SCREEN, URINE: NORMAL
MDMA, URINE: NORMAL
METHADONE SCREEN, URINE: NORMAL
METHAMPHETAMINE, URINE: NORMAL
OPIATE SCREEN URINE: NORMAL
OXYCODONE SCREEN URINE: NORMAL
PHENCYCLIDINE SCREEN URINE: NORMAL
PROPOXYPHENE SCREEN, URINE: NORMAL
SYNTHETIC CANNABINOIDS(K2) SCREEN, URINE: NORMAL
THC SCREEN, URINE: POSITIVE
TRAMADOL SCREEN URINE: NORMAL
TRICYCLIC ANTIDEPRESSANTS, UR: NORMAL

## 2025-07-14 NOTE — PROGRESS NOTES
2025    Rula Gayle (:  1975) is a 50 y.o. female, here for a preventive medicine evaluation.    Patient presents today for routine Pap and pelvic exam.  Her last Pap and pelvic exam was performed by Dr. Lorri Osei in .  Her last menstrual cycle was in 2023.  She is postmenopausal and reports some minimal hot flashes but otherwise no major menopausal symptoms.  She denies any postmenopausal bleeding, vaginal discharge or vaginal/pelvic pain.  She is sexually active with her  and denies any concerns with respect to this.  She does have a history of dense breast tissue and does have a mammogram yearly as well as an MRI of her breasts yearly.  Her MRI was done in 2025 and was normal.  She will be due for mammogram in August.  Patient presents today for routine follow-up of anxiety.  She does have a history of generalized anxiety disorder and uses Xanax as needed.  She reports this controls her symptoms well.  She does not feel that she needs any type of preventative treatment.  She denies any side effects from Xanax.  Her UDS and CSM are due.  She does use an occasional THC gummy for anxiety or sleep.  She does have a history of hyperlipidemia not treated with medication.    Previous blood work did show a mildly elevated hemoglobin and hematocrit likely secondary to hemoconcentration from fasting and her history of tobacco use.   She does have a history of COPD that was originally noted on a CT scan done in .  She was previously treated with symbicort but this was expensive so she never continued it.  She is completely asymptomatic.    She does complain of low back pain and mid back pain and has been taking a lot of ibuprofen / naproxen for this.  She was given a prescription for flexeril which she will use as needed.  She has been exercising regularly and this has been very helpful for her back pain.    She does have a history of kidney stones.  She did have 2

## 2025-07-14 NOTE — PROGRESS NOTES
Rula Gayle (:  1975) is a 50 y.o. female,Established patient, here for evaluation of the following chief complaint(s):  Gynecologic Exam         Assessment & Plan  Well woman exam with routine gynecological exam              No follow-ups on file.       Subjective   HPI    Review of Systems       Objective   Physical Exam             An electronic signature was used to authenticate this note.    --Harleen Gimenez MA

## 2025-08-03 DIAGNOSIS — F41.1 GENERALIZED ANXIETY DISORDER: ICD-10-CM

## 2025-08-04 LAB — CYTOLOGY REPORT: NORMAL

## 2025-08-05 RX ORDER — ALPRAZOLAM 0.5 MG
0.5 TABLET ORAL DAILY PRN
Qty: 30 TABLET | Refills: 0 | Status: SHIPPED | OUTPATIENT
Start: 2025-08-05 | End: 2026-08-05

## 2025-09-04 DIAGNOSIS — F41.1 GENERALIZED ANXIETY DISORDER: ICD-10-CM

## 2025-09-04 RX ORDER — ALPRAZOLAM 0.5 MG
0.5 TABLET ORAL DAILY PRN
Qty: 30 TABLET | Refills: 0 | Status: SHIPPED | OUTPATIENT
Start: 2025-09-04 | End: 2026-09-04

## (undated) DEVICE — SINGLE-USE BIOPSY FORCEPS: Brand: RADIAL JAW 4

## (undated) DEVICE — POLYP TRAP: Brand: TRAPEASE®

## (undated) DEVICE — NEEDLE SCLERO 25GA L240CM OD0.51MM ID0.24MM EXTN L4MM SHTH

## (undated) DEVICE — SNARE ENDOSCP L240CM W15MM SHTH DIA2.4MM CHN 2.8MM STIFF

## (undated) DEVICE — ELEVIEW SUBMUCOSAL INJECTABLE COMPOSITION 10ML

## (undated) DEVICE — DEFENDO AIR WATER SUCTION AND BIOPSY VALVE KIT FOR  OLYMPUS: Brand: DEFENDO AIR/WATER/SUCTION AND BIOPSY VALVE

## (undated) DEVICE — ENDO KIT W/SYRINGE: Brand: MEDLINE INDUSTRIES, INC.

## (undated) DEVICE — ERBE NESSY® OMEGA PLATE USA (85+23)CM² , WITH CABLE 3 M: Brand: ERBE